# Patient Record
Sex: FEMALE | Race: WHITE | NOT HISPANIC OR LATINO | Employment: UNEMPLOYED | URBAN - METROPOLITAN AREA
[De-identification: names, ages, dates, MRNs, and addresses within clinical notes are randomized per-mention and may not be internally consistent; named-entity substitution may affect disease eponyms.]

---

## 2017-01-06 ENCOUNTER — PRENATAL OFFICE VISIT (OUTPATIENT)
Dept: OBGYN | Facility: CLINIC | Age: 27
End: 2017-01-06
Payer: COMMERCIAL

## 2017-01-06 VITALS
BODY MASS INDEX: 29.92 KG/M2 | WEIGHT: 179.8 LBS | SYSTOLIC BLOOD PRESSURE: 100 MMHG | DIASTOLIC BLOOD PRESSURE: 60 MMHG | TEMPERATURE: 98.5 F

## 2017-01-06 DIAGNOSIS — Z34.83 ENCOUNTER FOR SUPERVISION OF OTHER NORMAL PREGNANCY IN THIRD TRIMESTER: Primary | ICD-10-CM

## 2017-01-06 PROCEDURE — 99207 ZZC PRENATAL VISIT: CPT | Performed by: OBSTETRICS & GYNECOLOGY

## 2017-01-06 NOTE — NURSING NOTE
"Chief Complaint   Patient presents with     Prenatal Care   33w0d  C/o cramping in legs michael at hs    initial /60 mmHg  Temp(Src) 98.5  F (36.9  C) (Oral)  Wt 179 lb 12.8 oz (81.557 kg)  LMP 04/30/2016 Estimated body mass index is 29.92 kg/(m^2) as calculated from the following:    Height as of 10/31/16: 5' 5\" (1.651 m).    Weight as of this encounter: 179 lb 12.8 oz (81.557 kg).  BP completed using cuff size regular.  Clotilde Murphy CMA  "

## 2017-01-24 ENCOUNTER — PRENATAL OFFICE VISIT (OUTPATIENT)
Dept: OBGYN | Facility: CLINIC | Age: 27
End: 2017-01-24
Payer: COMMERCIAL

## 2017-01-24 VITALS
WEIGHT: 181 LBS | BODY MASS INDEX: 30.12 KG/M2 | DIASTOLIC BLOOD PRESSURE: 66 MMHG | SYSTOLIC BLOOD PRESSURE: 100 MMHG | TEMPERATURE: 97.8 F

## 2017-01-24 DIAGNOSIS — Z34.83 ENCOUNTER FOR SUPERVISION OF OTHER NORMAL PREGNANCY IN THIRD TRIMESTER: Primary | ICD-10-CM

## 2017-01-24 PROCEDURE — 99207 ZZC PRENATAL VISIT: CPT | Performed by: OBSTETRICS & GYNECOLOGY

## 2017-01-24 PROCEDURE — 87653 STREP B DNA AMP PROBE: CPT | Performed by: OBSTETRICS & GYNECOLOGY

## 2017-01-24 NOTE — PROGRESS NOTES
Carolyn Jacobs is a 26 year old female  Estimated Date of Delivery: 2017 at 35w4d who presents for a prenatal visit. She denies bleeding, abnormal leaking, or labor symptoms.   AGA doing well, good FM no concerns     This document serves as a record of the services and decisions personally performed and made by Valdemar Martinez M.D. It was created on his behalf by January Deal, a trained medical scribe. The creation of this document is based the provider's statements to the medical scribe.  January Deal 2017 1:43 PM     See OB Flowsheet  : normal external genitalia, speculum: no evidence of rupture of membranes    A: doing well over all, GBS done today  P: Follow up in 1 weeks. labor symptoms and symptoms of concern discussed, patient to call     The information in this document, created by the medical scribe for me, accurately reflects the services I personally performed and the decisions made by me. I have reviewed and approved this document for accuracy prior to leaving the patient care area.  2017 1:43 PM        Valdemar Martinez M.D.

## 2017-01-24 NOTE — PATIENT INSTRUCTIONS
You can reach your [facility] Care Team any time of the day by calling [phone]. This number will put you in touch with the 24 hour nurse line if the clinic is closed.    To contact your OB/GYN Station Coordinator/Surgery Scheduler please call [phone]. This is a direct number for your care team between 8 a.m. and 4 p.m. Monday through Friday.    [facility] is open for your convenience:  Monday through Friday 8 a.m. to 6 p.m.  Closed weekends and all major holidays.

## 2017-01-25 LAB
GP B STREP DNA SPEC QL NAA+PROBE: NORMAL
SPECIMEN SOURCE: NORMAL

## 2017-01-26 NOTE — PROGRESS NOTES
Quick Note:    Carolyn, all your results are within normal limits  Valdemar Martinez M.D.    ______

## 2017-01-31 ENCOUNTER — PRENATAL OFFICE VISIT (OUTPATIENT)
Dept: OBGYN | Facility: CLINIC | Age: 27
End: 2017-01-31
Payer: COMMERCIAL

## 2017-01-31 VITALS
WEIGHT: 183.2 LBS | TEMPERATURE: 97.7 F | SYSTOLIC BLOOD PRESSURE: 106 MMHG | DIASTOLIC BLOOD PRESSURE: 60 MMHG | HEIGHT: 65 IN | BODY MASS INDEX: 30.52 KG/M2

## 2017-01-31 DIAGNOSIS — Z53.9 ERRONEOUS ENCOUNTER--DISREGARD: Primary | ICD-10-CM

## 2017-01-31 PROCEDURE — 99207 ZZC NO CHARGE NURSE ONLY: CPT | Performed by: OBSTETRICS & GYNECOLOGY

## 2017-01-31 NOTE — NURSING NOTE
"36w4d    Chief Complaint   Patient presents with     Prenatal Care       Initial /60 mmHg  Temp(Src) 97.7  F (36.5  C) (Oral)  Ht 5' 5\" (1.651 m)  Wt 183 lb 3.2 oz (83.099 kg)  BMI 30.49 kg/m2  LMP 04/30/2016 Estimated body mass index is 30.49 kg/(m^2) as calculated from the following:    Height as of this encounter: 5' 5\" (1.651 m).    Weight as of this encounter: 183 lb 3.2 oz (83.099 kg).  BP completed using cuff size: tru Chacon CMA      "

## 2017-02-06 ENCOUNTER — PRENATAL OFFICE VISIT (OUTPATIENT)
Dept: OBGYN | Facility: CLINIC | Age: 27
End: 2017-02-06
Payer: COMMERCIAL

## 2017-02-06 VITALS
SYSTOLIC BLOOD PRESSURE: 100 MMHG | BODY MASS INDEX: 30.89 KG/M2 | WEIGHT: 185.6 LBS | DIASTOLIC BLOOD PRESSURE: 76 MMHG | TEMPERATURE: 98.1 F

## 2017-02-06 DIAGNOSIS — Z34.83 ENCOUNTER FOR SUPERVISION OF OTHER NORMAL PREGNANCY IN THIRD TRIMESTER: Primary | ICD-10-CM

## 2017-02-06 PROCEDURE — 99207 ZZC PRENATAL VISIT: CPT | Performed by: OBSTETRICS & GYNECOLOGY

## 2017-02-06 NOTE — PROGRESS NOTES
Carolyn Jacobs is a 26 year old female  Estimated Date of Delivery: 2017 at 37w3d who presents for a prenatal visit.   AGA doing well, good FM no concerns     Patient states she and baby are doing well, +fetal movement reported. Believes she is having some false contractions, not concerned. Denies bleeding or fluid loss. Notes shooting pain on inner thighs, increased discomfort overall.     This document serves as a record of the services and decisions personally performed and made by Valdemar Martinez M.D. It was created on his behalf by Jillian Mobley, a trained medical scribe. The creation of this document is based the provider's statements to the medical scribe.  Jillian Zendejas 2017 8:19 AM     See OB Flowsheet    A: Doing well, no concerns.   P: Follow up in 1 week. labor symptoms and symptoms of concern discussed, patient to call     The information in this document, created by the medical scribe for me, accurately reflects the services I personally performed and the decisions made by me. I have reviewed and approved this document for accuracy prior to leaving the patient care area.  2017 8:19 AM     Valdemar Martinez M.D.

## 2017-02-06 NOTE — PROGRESS NOTES
"Chief Complaint   Patient presents with     Prenatal Care       Initial LMP 04/30/2016 Estimated body mass index is 30.49 kg/(m^2) as calculated from the following:    Height as of 1/31/17: 5' 5\" (1.651 m).    Weight as of 1/31/17: 183 lb 3.2 oz (83.099 kg).  Medication Reconciliation: complete   37w3d      "

## 2017-02-06 NOTE — PATIENT INSTRUCTIONS
You can reach your [facility] Care Team any time of the day by calling [phone]. This number will put you in touch with the 24 hour nurse line if the clinic is closed.    To contact your OB/GYN Station Coordinator/Surgery Scheduler please call [phone]. This is a direct number for your care team between 8 a.m. and 4 p.m. Monday through Friday.    [facility] is open for your convenience:  Monday through Friday 8 a.m. to 6 p.m.  Closed weekends and all major holidays.

## 2017-02-13 ENCOUNTER — PRENATAL OFFICE VISIT (OUTPATIENT)
Dept: OBGYN | Facility: CLINIC | Age: 27
End: 2017-02-13
Payer: COMMERCIAL

## 2017-02-13 VITALS
DIASTOLIC BLOOD PRESSURE: 60 MMHG | WEIGHT: 187.5 LBS | BODY MASS INDEX: 31.2 KG/M2 | SYSTOLIC BLOOD PRESSURE: 118 MMHG | TEMPERATURE: 97.6 F

## 2017-02-13 DIAGNOSIS — Z34.83 ENCOUNTER FOR SUPERVISION OF OTHER NORMAL PREGNANCY IN THIRD TRIMESTER: Primary | ICD-10-CM

## 2017-02-13 PROCEDURE — 99207 ZZC PRENATAL VISIT: CPT | Performed by: OBSTETRICS & GYNECOLOGY

## 2017-02-13 NOTE — NURSING NOTE
"Chief Complaint   Patient presents with     Prenatal Care     No concerns.       Initial /60 (BP Location: Right arm, Patient Position: Chair, Cuff Size: Adult Regular)  Temp 97.6  F (36.4  C) (Oral)  Wt 187 lb 8 oz (85 kg)  LMP 04/30/2016  BMI 31.2 kg/m2 Estimated body mass index is 31.2 kg/(m^2) as calculated from the following:    Height as of 1/31/17: 5' 5\" (1.651 m).    Weight as of this encounter: 187 lb 8 oz (85 kg).  Medication Reconciliation: complete   Steffany Omer MA      "

## 2017-02-13 NOTE — MR AVS SNAPSHOT
MRN:[id number]                      After Visit Summary   2/13/2017    Carolyn Jacobs    MRN: [id number]           Visit Information        Provider Department      2/13/2017 2:00 PM Valdemar Martinez MD [facility]        Your next 10 appointments already scheduled     Feb 17, 2017  3:00 PM CST   ESTABLISHED PRENATAL with Catarina Rivera MD   [facility] ([facility])    [address]  [facility] [id number] [phone]              Care Instructions    You can reach your [facility] Care Team any time of the day by calling [phone]. This number will put you in touch with the 24 hour nurse line if the clinic is closed.    To contact your OB/GYN Station Coordinator/Surgery Scheduler please call [phone]. This is a direct number for your care team between 8 a.m. and 4 p.m. Monday through Friday.    [facility] is open for your convenience:  Monday through Friday 8 a.m. to 6 p.m.  Closed weekends and all major holidays.         [employer] Information     [employer] gives you secure access to your electronic health record. If you see a primary care provider, you can also send messages to your care team and make appointments. If you have questions, please call your primary care clinic.  If you do not have a primary care provider, please call [phone] and they will assist you.        Care EveryWhere ID     This is your Care EveryWhere ID. This could be used by other organizations to access your [facility] medical records  [id number]

## 2017-02-13 NOTE — PATIENT INSTRUCTIONS
You can reach your [facility] Care Team any time of the day by calling [phone]. This number will put you in touch with the 24 hour nurse line if the clinic is closed.    To contact your OB/GYN Station Coordinator/Surgery Scheduler please call [phone]. This is a direct number for your care team between 8 a.m. and 4 p.m. Monday through Friday.    [facility] is open for your convenience:  Monday through Friday 8 a.m. to 6 p.m.  Closed weekends and all major holidays.

## 2017-02-13 NOTE — PROGRESS NOTES
Carolyn Jacobs is a 26 year old female  Estimated Date of Delivery: 2017 at 38w3d who presents for a prenatal visit.   AGA doing well, good FM no concerns     Patient states she and baby are doing well. Patient states she has not been feeling as much activity for the last few days but still has the required amount of fetal movement daily. Patient mainly feels baby activity at night around 7pm and 8pm. She feels she has had some contractions last week but nothing concerning. Otherwise, denies leaking fluid or bleeding.     This document serves as a record of the services and decisions personally performed and made by Valdemar Martinez M.D. It was created on his behalf by Marisela Oakes, a trained medical scribe. The creation of this document is based the provider's statements to the medical scribe.  Marisela Oakes 2017 8:39 AM     See OB Flowsheet    A: doing well, no concerns. Discussed with patient about tracking baby activity and 10 baby movements for 1 hour  P: Follow up in 1 week. labor symptoms and symptoms of concern discussed, patient to call     The information in this document, created by the medical scribe for me, accurately reflects the services I personally performed and the decisions made by me. I have reviewed and approved this document for accuracy prior to leaving the patient care area.  2017 8:39 AM       Valdemar Martinez M.D.

## 2017-02-16 ENCOUNTER — HOSPITAL ENCOUNTER (OUTPATIENT)
Facility: CLINIC | Age: 27
Discharge: HOME | End: 2017-02-16
Attending: FAMILY MEDICINE | Admitting: FAMILY MEDICINE
Payer: COMMERCIAL

## 2017-02-16 VITALS
DIASTOLIC BLOOD PRESSURE: 68 MMHG | HEIGHT: 64 IN | BODY MASS INDEX: 31.92 KG/M2 | HEART RATE: 84 BPM | WEIGHT: 187 LBS | TEMPERATURE: 97.6 F | RESPIRATION RATE: 16 BRPM | SYSTOLIC BLOOD PRESSURE: 107 MMHG

## 2017-02-16 PROBLEM — O36.8190 DECREASED FETAL MOVEMENT: Status: ACTIVE | Noted: 2017-02-16

## 2017-02-16 PROCEDURE — 59025 FETAL NON-STRESS TEST: CPT

## 2017-02-16 PROCEDURE — 59025 FETAL NON-STRESS TEST: CPT | Mod: 26 | Performed by: OBSTETRICS & GYNECOLOGY

## 2017-02-16 PROCEDURE — 99213 OFFICE O/P EST LOW 20 MIN: CPT | Mod: 25

## 2017-02-16 RX ORDER — ONDANSETRON 2 MG/ML
4 INJECTION INTRAMUSCULAR; INTRAVENOUS EVERY 6 HOURS PRN
Status: DISCONTINUED | OUTPATIENT
Start: 2017-02-16 | End: 2017-02-16 | Stop reason: HOSPADM

## 2017-02-16 NOTE — PLAN OF CARE
Patient arrived to L&D for decreased fetal movement.  38+6W gestation. Reports not feeling the baby move since yesterday. Denies bleeding, has had some mucous discharge. Reports cramping in her back and occasional ctx. History and physical obtained. SVE unchanged from previous clinic visit. External monitors applied for NST. Patient does hear baby's hiccups, reassured about baby's heart rate.

## 2017-02-16 NOTE — PROGRESS NOTES
Data: [facility] at 1410.   Reason for maternal/fetal assessment per patient is Decreased Fetal Movement  . Patient is a . Prenatal record reviewed.      Obstetric History       T1      TAB0   SAB0   E0   M0   L1       # Outcome Date GA Lbr Doyle/2nd Weight Sex Delivery Anes PTL Lv   3 Current            2 Term 09/10/14 39w0d 03:55 / 04:40 3.46 kg (7 lb 10.1 oz) M Vag-Spont EPI N Y      Name: Ian      Apgar1:  8                Apgar5: 9   1 AB                  Medical History:   Past Medical History   Diagnosis Date     Abnormal Pap smear      HPV & normal      Anemia      not on iron vits, O+ blood type     Anxiety      Varicosities    . Gestational Age 38w6d. VSS. Cervix: dilated to 2/50/-2.  Fetal movement present. Patient denies  UTI symptoms, GI problems, bloody show, vaginal bleeding, edema, headache, visual disturbances, epigastric or URQ pain, abdominal pain, rupture of membranes. Support persons sister Merly is present.  Action: Verbal consent for EFM. Triage assessment completed. EFM applied for fetal well being. Uterine assessment completed. Fetal assessment: Presumed adequate fetal oxygenation documented (see flow record). Patient education pamphlets given on fetal movement counts and labor instructions. Patient instructed to report change in fetal movement, vaginal leaking of fluid or bleeding, abdominal pain, or any concerns related to the pregnancy to her nurse/physician.   Response: Dr. Dior informed of reactive NST, SVE. Plan per provider is discharge to home. Follow up as scheduled. Patient verbalized understanding of education and verbalized agreement with plan. Discharged ambulatory at 1456.

## 2017-02-16 NOTE — IP AVS SNAPSHOT
[facility] Labor [facility] Delivery    [address]    [facility] [address]    Phone:  [phone]    Fax:  [phone]                                       After Visit Summary   2/16/2017    Carolyn Jacobs    MRN: [id number]           After Visit Summary Signature Page     I have received my discharge instructions, and my questions have been answered. I have discussed any challenges I see with this plan with the nurse or doctor.    ..........................................................................................................................................  Patient/Patient Representative Signature      ..........................................................................................................................................  Patient Representative Print Name and Relationship to Patient    ..................................................               ................................................  Date                                            Time    ..........................................................................................................................................  Reviewed by Signature/Title    ...................................................              ..............................................  Date                                                            Time

## 2017-02-16 NOTE — DISCHARGE INSTRUCTIONS
Discharge Instruction for Undelivered Patients      You were seen for: Fetal Assessment  We Consulted: Dr. Dior  You had (Test or Medicine):Non-Stress Test     Diet:   Drink 8 to 12 glasses of liquids (milk, juice, water) every day.  You may eat meals and snacks.     Activity:  Count fetal kicks everyday (see handout)  Call your doctor or nurse midwife if your baby is moving less than usual.     Call your provider if you notice:  Swelling in your face or increased swelling in your hands or legs.  Headaches that are not relieved by Tylenol (acetaminophen).  Changes in your vision (blurring: seeing spots or stars.)  Nausea (sick to your stomach) and vomiting (throwing up).   Weight gain of 5 pounds or more per week.  Heartburn that doesn't go away.  Signs of bladder infection: pain when you urinate (use the toilet), need to go more often and more urgently.  The bag of rodriguez (rupture of membranes) breaks, or you notice leaking in your underwear.  Bright red blood in your underwear.  Abdominal (lower belly) or stomach pain.  Second (plus) baby: Contractions (tightening) less than 10 minutes apart and getting stronger.  Increase or change in vaginal discharge (note the color and amount)    Follow-up:  As scheduled in the clinic

## 2017-02-16 NOTE — IP AVS SNAPSHOT
MRN:[id number]                      After Visit Summary   2/16/2017    Carolyn Jacobs    MRN: [id number]           Thank you!     Thank you for choosing [facility] for your care. Our goal is always to provide you with excellent care. Hearing back from our patients is one way we can continue to improve our services. Please take a few minutes to complete the written survey that you may receive in the mail after you visit. If you would like to speak to someone directly about your visit please contact Patient Relations at [phone]. Thank you!          Patient Information     Date Of Birth          1990        About your hospital stay     You were admitted on:  February 16, 2017 You last received care in the:  [facility] Labor and Delivery    You were discharged on:  February 16, 2017       Who to Call     For medical emergencies, please call 911.  For non-urgent questions about your medical care, please call your primary care provider or clinic, [phone]          Attending Provider     Provider Specialty    Vicky Dior DO OB/Gyn       Primary Care Provider Office Phone # Fax #    Tanisha Amaya [phone]       [facility] [address] [place] [address]        Your next 10 appointments already scheduled     Feb 17, 2017  3:00 PM CST   ESTABLISHED PRENATAL with Catarina Rivera MD   [facility]    [address]  [facility] [id number] [phone]              Further instructions from your care team       Discharge Instruction for Undelivered Patients      You were seen for: Fetal Assessment  We Consulted: Dr. Dior  You had (Test or Medicine):Non-Stress Test     Diet:   Drink 8 to 12 glasses of liquids (milk, juice, water) every day.  You may eat meals and snacks.     Activity:  Count fetal kicks everyday (see handout)  Call your doctor or nurse midwife if your  "baby is moving less than usual.     Call your provider if you notice:  Swelling in your face or increased swelling in your hands or legs.  Headaches that are not relieved by Tylenol (acetaminophen).  Changes in your vision (blurring: seeing spots or stars.)  Nausea (sick to your stomach) and vomiting (throwing up).   Weight gain of 5 pounds or more per week.  Heartburn that doesn't go away.  Signs of bladder infection: pain when you urinate (use the toilet), need to go more often and more urgently.  The bag of rodriguez (rupture of membranes) breaks, or you notice leaking in your underwear.  Bright red blood in your underwear.  Abdominal (lower belly) or stomach pain.  Second (plus) baby: Contractions (tightening) less than 10 minutes apart and getting stronger.  Increase or change in vaginal discharge (note the color and amount)    Follow-up:  As scheduled in the clinic          Pending Results     No orders found from 2/14/2017 to 2/17/2017.            Admission Information     Date & Time Provider Department Dept. Phone    2/16/2017 Vicky Dior, DO [facility] Labor and Delivery [phone]      Your Vitals Were     Blood Pressure Pulse Temperature Respirations Height Weight    107/68 84 97.6  F (36.4  C) (Oral) 16 1.626 m (5' 4\") 84.8 kg (187 lb)    Last Period BMI (Body Mass Index)                04/30/2016 32.1 kg/m2          [employer] Information     [employer] gives you secure access to your electronic health record. If you see a primary care provider, you can also send messages to your care team and make appointments. If you have questions, please call your primary care clinic.  If you do not have a primary care provider, please call [phone] and they will assist you.        Care EveryWhere ID     This is your Care EveryWhere ID. This could be used by other organizations to access your [facility] medical records  [id number]           Review of your medicines      UNREVIEWED medicines. Ask your " doctor about these medicines        Dose / Directions    PRENATAL VIT-FE BISG-FA-DHA PO        Refills:  0                Protect others around you: Learn how to safely use, store and throw away your medicines at www.disposemymeds.org.             Medication List: This is a list of all your medications and when to take them. Check marks below indicate your daily home schedule. Keep this list as a reference.      Medications           Morning Afternoon Evening Bedtime As Needed    PRENATAL VIT-FE BISG-FA-DHA PO

## 2017-02-16 NOTE — PROVIDER NOTIFICATION
02/16/17 1445   Provider Notification   Provider Name/Title Dr. Dior   Method of Notification Electronic Page   Request Evaluate - Remote   Notification Reason Patient Arrived;SVE;Uterine Activity   Dr. Dior updated on patient here for decreased fetal movement. NST reactive. SVE unchanged from last clinic visit. Okay for patient to discharge, follow up in clinic as scheduled.

## 2017-02-17 ENCOUNTER — PRENATAL OFFICE VISIT (OUTPATIENT)
Dept: OBGYN | Facility: CLINIC | Age: 27
End: 2017-02-17
Payer: COMMERCIAL

## 2017-02-17 VITALS
SYSTOLIC BLOOD PRESSURE: 110 MMHG | WEIGHT: 184.5 LBS | BODY MASS INDEX: 31.67 KG/M2 | DIASTOLIC BLOOD PRESSURE: 70 MMHG | TEMPERATURE: 97.9 F

## 2017-02-17 DIAGNOSIS — Z34.03 ENCOUNTER FOR SUPERVISION OF NORMAL FIRST PREGNANCY IN THIRD TRIMESTER: Primary | ICD-10-CM

## 2017-02-17 PROCEDURE — 99207 ZZC PRENATAL VISIT: CPT | Performed by: OBSTETRICS & GYNECOLOGY

## 2017-02-17 NOTE — MR AVS SNAPSHOT
After Visit Summary   2/17/2017    Carolyn Jacobs    MRN: [id number]           Patient Information     Date Of Birth          1990        Visit Information        Provider Department      2/17/2017 3:00 PM Catarina Rivera MD [facility]        Today's Diagnoses     Encounter for supervision of normal first pregnancy in third trimester    -  1       Follow-ups after your visit        Your next 10 appointments already scheduled     Feb 24, 2017  2:30 PM CST   ESTABLISHED PRENATAL with Dotty Hamlin MD   [facility] ([facility])    [address]  [facility] [id number] [phone]              Who to contact     If you have questions or need follow up information about today's clinic visit or your schedule please contact [facility] directly at [phone].  Normal or non-critical lab and imaging results will be communicated to you by MyChart, letter or phone within 4 business days after the clinic has received the results. If you do not hear from us within 7 days, please contact the clinic through MyChart or phone. If you have a critical or abnormal lab result, we will notify you by phone as soon as possible.  Submit refill requests through [email or web address] or call your pharmacy and they will forward the refill request to us. Please allow 3 business days for your refill to be completed.          Additional Information About Your Visit        MyChart Information     [email or web address] gives you secure access to your electronic health record. If you see a primary care provider, you can also send messages to your care team and make appointments. If you have questions, please call your primary care clinic.  If you do not have a primary care provider, please call [phone] and they will assist you.        Care EveryWhere ID     This is your Care EveryWhere ID. This could be used by other organizations to access your [facility]  medical records  [id number]        Your Vitals Were     Temperature Last Period BMI (Body Mass Index)             97.9  F (36.6  C) 04/30/2016 31.67 kg/m2          Blood Pressure from Last 3 Encounters:   02/17/17 110/70   02/16/17 107/68   02/13/17 118/60    Weight from Last 3 Encounters:   02/17/17 184 lb 8 oz (83.7 kg)   02/16/17 187 lb (84.8 kg)   02/13/17 187 lb 8 oz (85 kg)              Today, you had the following     No orders found for display       Primary Care Provider Office Phone # Fax #    Tanisha Amaya [phone]       [facility] [address] [place] [address]        Thank you!     Thank you for choosing [facility]  for your care. Our goal is always to provide you with excellent care. Hearing back from our patients is one way we can continue to improve our services. Please take a few minutes to complete the written survey that you may receive in the mail after your visit with us. Thank you!             Your Updated Medication List - Protect others around you: Learn how to safely use, store and throw away your medicines at www.disposemymeds.org.          This list is accurate as of: 2/17/17  3:27 PM.  Always use your most recent med list.                   Brand Name Dispense Instructions for use    PRENATAL VIT-FE BISG-FA-DHA PO

## 2017-02-17 NOTE — NURSING NOTE
"Chief Complaint   Patient presents with     Prenatal Care       Initial /70 (BP Location: Left arm, Patient Position: Chair, Cuff Size: Adult Regular)  Temp 97.9  F (36.6  C)  Wt 184 lb 8 oz (83.7 kg)  LMP 04/30/2016  BMI 31.67 kg/m2 Estimated body mass index is 31.67 kg/(m^2) as calculated from the following:    Height as of 2/16/17: 5' 4\" (1.626 m).    Weight as of this encounter: 184 lb 8 oz (83.7 kg).  Medication Reconciliation: complete  39w0d    "

## 2017-02-17 NOTE — PROGRESS NOTES
Doing well today. Would like her cervix stripped. In triage last night for decreased fetal movement with normal NST. Average movement today. No bleeding. Rare contractions.   /70 (BP Location: Left arm, Patient Position: Chair, Cuff Size: Adult Regular)  Temp 97.9  F (36.6  C)  Wt 184 lb 8 oz (83.7 kg)  LMP 2016  BMI 31.67 kg/m2   See OB flowsheet.    Carolyn Jacobs is a 26 year old female  at 39w0d   Reviewed labor precautions.   Return to clinic in 1 week if not delivered.     Catarina Rivera MD

## 2017-02-21 ENCOUNTER — HOSPITAL ENCOUNTER (OUTPATIENT)
Facility: CLINIC | Age: 27
Discharge: HOME | End: 2017-02-21
Attending: OBSTETRICS & GYNECOLOGY | Admitting: OBSTETRICS & GYNECOLOGY
Payer: COMMERCIAL

## 2017-02-21 VITALS
TEMPERATURE: 97.6 F | HEART RATE: 84 BPM | RESPIRATION RATE: 16 BRPM | HEIGHT: 64 IN | DIASTOLIC BLOOD PRESSURE: 59 MMHG | SYSTOLIC BLOOD PRESSURE: 105 MMHG

## 2017-02-21 PROCEDURE — 59025 FETAL NON-STRESS TEST: CPT

## 2017-02-21 PROCEDURE — 59025 FETAL NON-STRESS TEST: CPT | Mod: 26 | Performed by: OBSTETRICS & GYNECOLOGY

## 2017-02-21 PROCEDURE — 99213 OFFICE O/P EST LOW 20 MIN: CPT | Mod: 25

## 2017-02-21 RX ORDER — ONDANSETRON 2 MG/ML
4 INJECTION INTRAMUSCULAR; INTRAVENOUS EVERY 6 HOURS PRN
Status: DISCONTINUED | OUTPATIENT
Start: 2017-02-21 | End: 2017-02-21 | Stop reason: HOSPADM

## 2017-02-21 NOTE — IP AVS SNAPSHOT
MRN:[id number]                      After Visit Summary   2017    Carolyn Jacobs    MRN: [id number]           Thank you!     Thank you for choosing [facility] for your care. Our goal is always to provide you with excellent care. Hearing back from our patients is one way we can continue to improve our services. Please take a few minutes to complete the written survey that you may receive in the mail after you visit. If you would like to speak to someone directly about your visit please contact Patient Relations at [phone]. Thank you!          Patient Information     Date Of Birth          1990        About your hospital stay     You were admitted on:  2017 You last received care in the:  [facility] Labor and Delivery    You were discharged on:  2017       Who to Call     For medical emergencies, please call 911.  For non-urgent questions about your medical care, please call your primary care provider or clinic, [phone]          Attending Provider     Provider Specialty    Catarina Rivera MD OB/Gyn       Primary Care Provider Office Phone # Fax #    Tanishasujata Amaya [phone]       [facility] [address] [facility] [address]        Your next 10 appointments already scheduled     2017  2:30 PM CST   ESTABLISHED PRENATAL with Dotty Hamlin MD   [facility]    [address]  [facility] [address] [phone]              Further instructions from your care team       Data: Patient presented to the [facility] at 1150.   Reason for maternal/fetal assessment per patient is Decreased Fetal Movement  . Patient is a . Prenatal record reviewed.      Obstetric History       T1      TAB0   SAB0   E0   M0   L1       # Outcome Date GA Lbr Doyle/2nd Weight Sex Delivery Anes PTL Lv   3 Current            2 Term 09/10/14  "39w0d 03:55 / 04:40 3.46 kg (7 lb 10.1 oz) M Vag-Spont EPI N Y      Name: Ian      Apgar1:  8                Apgar5: 9   1 AB 2005                 Medical History:   Past Medical History   Diagnosis Date     Abnormal Pap smear      HPV & normal      Anemia      not on iron vits, O+ blood type     Anxiety      Varicosities    . Gestational Age 39w4d. VSS. Cervix: dilated to 3 which is not a change.  Fetal movement present. Patient denies cramping, backache, vaginal discharge, pelvic pressure, UTI symptoms, GI problems, bloody show, vaginal bleeding, edema, headache, visual disturbances, epigastric or URQ pain, abdominal pain, rupture of membranes. Support persons not  present.  Action: Verbal consent for EFM. Triage assessment completed. EFM applied for fetal well-being. Uterine assessment done with external uterine monitor. Fetal assessment: Presumed adequate fetal oxygenation documented (see flow record).  Patient instructed to report change in fetal movement, vaginal leaking of fluid or bleeding, abdominal pain, or any concerns related to the pregnancy to her nurse/physician.   Response: Dr. Rivera informed of patient's arrival. Plan per provider is patient to be discharged. Patient verbalized understanding of education and verbalized agreement with plan. Discharged ambulatory at 1250.        Pending Results     No orders found from 2/19/2017 to 2/22/2017.            Admission Information     Date & Time Provider Department Dept. Phone    2/21/2017 Catarina Rivera MD [facility] Labor and Delivery [phone]      Your Vitals Were     Blood Pressure Pulse Temperature Respirations Height Last Period    105/59 84 97.6  F (36.4  C) (Oral) 16 1.626 m (5' 4\") 04/30/2016      [employer] Information     [employer] gives you secure access to your electronic health record. If you see a primary care provider, you can also send messages to your care team and make appointments. If you have questions, please call your primary " care clinic.  If you do not have a primary care provider, please call [phone] and they will assist you.        Care EveryWhere ID     This is your Care EveryWhere ID. This could be used by other organizations to access your [place] medical records  [id number]           Review of your medicines      UNREVIEWED medicines. Ask your doctor about these medicines        Dose / Directions    PRENATAL VIT-FE BISG-FA-DHA PO        Refills:  0                Protect others around you: Learn how to safely use, store and throw away your medicines at www.disposemymeds.org.             Medication List: This is a list of all your medications and when to take them. Check marks below indicate your daily home schedule. Keep this list as a reference.      Medications           Morning Afternoon Evening Bedtime As Needed    PRENATAL VIT-FE BISG-FA-DHA PO

## 2017-02-21 NOTE — PROVIDER NOTIFICATION
02/21/17 1243   Provider Notification   Provider Name/Title Dr. Rivera   Method of Notification Phone   Request Evaluate - Remote   Notification Reason Patient Arrived;Status Update     Dr. Rivera notified of patient's admission and concern for decreased fetal movement. Category 1 tracing noted with baseline of 130 bpm and accels to 145 x 15 minutes, meeting criteria for reactive NST.

## 2017-02-21 NOTE — IP AVS SNAPSHOT
[facility] Labor and Delivery    [address]    [facility] [address]    Phone:  [phone]    Fax:  [phone]                                       After Visit Summary   2/21/2017    Carolyn Jacobs    MRN: [id number]           After Visit Summary Signature Page     I have received my discharge instructions, and my questions have been answered. I have discussed any challenges I see with this plan with the nurse or doctor.    ..........................................................................................................................................  Patient/Patient Representative Signature      ..........................................................................................................................................  Patient Representative Print Name and Relationship to Patient    ..................................................               ................................................  Date                                            Time    ..........................................................................................................................................  Reviewed by Signature/Title    ...................................................              ..............................................  Date                                                            Time

## 2017-02-21 NOTE — DISCHARGE INSTRUCTIONS
Data: Patient presented to the [facility] at 1150.   Reason for maternal/fetal assessment per patient is Decreased Fetal Movement  . Patient is a . Prenatal record reviewed.      Obstetric History       T1      TAB0   SAB0   E0   M0   L1       # Outcome Date GA Lbr Doyle/2nd Weight Sex Delivery Anes PTL Lv   3 Current            2 Term 09/10/14 39w0d 03:55 / 04:40 3.46 kg (7 lb 10.1 oz) M Vag-Spont EPI N Y      Name: Ian      Apgar1:  8                Apgar5: 9   1 AB                  Medical History:   Past Medical History   Diagnosis Date     Abnormal Pap smear      HPV & normal      Anemia      not on iron vits, O+ blood type     Anxiety      Varicosities    . Gestational Age 39w4d. VSS. Cervix: dilated to 3 which is not a change.  Fetal movement present. Patient denies cramping, backache, vaginal discharge, pelvic pressure, UTI symptoms, GI problems, bloody show, vaginal bleeding, edema, headache, visual disturbances, epigastric or URQ pain, abdominal pain, rupture of membranes. Support persons not  present.  Action: Verbal consent for EFM. Triage assessment completed. EFM applied for fetal well-being. Uterine assessment done with external uterine monitor. Fetal assessment: Presumed adequate fetal oxygenation documented (see flow record).  Patient instructed to report change in fetal movement, vaginal leaking of fluid or bleeding, abdominal pain, or any concerns related to the pregnancy to her nurse/physician.   Response: Dr. Rivera informed of patient's arrival. Plan per provider is patient to be discharged. Patient verbalized understanding of education and verbalized agreement with plan. Discharged ambulatory at 1250.

## 2017-02-24 ENCOUNTER — PRENATAL OFFICE VISIT (OUTPATIENT)
Dept: OBGYN | Facility: CLINIC | Age: 27
End: 2017-02-24
Payer: COMMERCIAL

## 2017-02-24 VITALS
DIASTOLIC BLOOD PRESSURE: 70 MMHG | SYSTOLIC BLOOD PRESSURE: 102 MMHG | HEIGHT: 64 IN | WEIGHT: 185.2 LBS | BODY MASS INDEX: 31.62 KG/M2 | TEMPERATURE: 97.7 F

## 2017-02-24 DIAGNOSIS — O36.8130 DECREASED FETAL MOVEMENT, THIRD TRIMESTER, NOT APPLICABLE OR UNSPECIFIED FETUS: ICD-10-CM

## 2017-02-24 DIAGNOSIS — Z34.03 ENCOUNTER FOR SUPERVISION OF NORMAL FIRST PREGNANCY IN THIRD TRIMESTER: Primary | ICD-10-CM

## 2017-02-24 PROCEDURE — 59025 FETAL NON-STRESS TEST: CPT | Performed by: OBSTETRICS & GYNECOLOGY

## 2017-02-24 PROCEDURE — 99207 ZZC PRENATAL VISIT: CPT | Performed by: OBSTETRICS & GYNECOLOGY

## 2017-02-24 NOTE — MR AVS SNAPSHOT
"              After Visit Summary   2/24/2017    Carolyn Jacobs    MRN: [id number]           Patient Information     Date Of Birth          1990        Visit Information        Provider Department      2/24/2017 2:30 PM Dotty Hamlin MD [facility]        Today's Diagnoses     Encounter for supervision of normal first pregnancy in third trimester    -  1    Decreased fetal movement, third trimester, not applicable or unspecified fetus           Follow-ups after your visit        Who to contact     If you have questions or need follow up information about today's clinic visit or your schedule please contact [facility] directly at [phone].  Normal or non-critical lab and imaging results will be communicated to you by [employer], letter or phone within 4 business days after the clinic has received the results. If you do not hear from us within 7 days, please contact the clinic through [employer] or phone. If you have a critical or abnormal lab result, we will notify you by phone as soon as possible.  Submit refill requests through [employer] or call your pharmacy and they will forward the refill request to us. Please allow 3 business days for your refill to be completed.          Additional Information About Your Visit        MyChart Information     [employer] gives you secure access to your electronic health record. If you see a primary care provider, you can also send messages to your care team and make appointments. If you have questions, please call your primary care clinic.  If you do not have a primary care provider, please call [phone] and they will assist you.        Care EveryWhere ID     This is your Care EveryWhere ID. This could be used by other organizations to access your [place] medical records  [id number]        Your Vitals Were     Temperature Height Last Period Breastfeeding? BMI (Body Mass Index)       97.7  F (36.5  C) (Oral) 5' 4\" (1.626 m) " 04/30/2016 No 31.79 kg/m2        Blood Pressure from Last 3 Encounters:   02/24/17 102/70   02/21/17 105/59   02/17/17 110/70    Weight from Last 3 Encounters:   02/24/17 185 lb 3.2 oz (84 kg)   02/17/17 184 lb 8 oz (83.7 kg)   02/16/17 187 lb (84.8 kg)              We Performed the Following     FETAL NON-STRESS TEST        Primary Care Provider Office Phone # Fax #    Tanishasujata Amaya [phone]       [facility] [address] [place] [address]        Thank you!     Thank you for choosing [facility]  for your care. Our goal is always to provide you with excellent care. Hearing back from our patients is one way we can continue to improve our services. Please take a few minutes to complete the written survey that you may receive in the mail after your visit with us. Thank you!             Your Updated Medication List - Protect others around you: Learn how to safely use, store and throw away your medicines at www.disposemymeds.org.          This list is accurate as of: 2/24/17  4:10 PM.  Always use your most recent med list.                   Brand Name Dispense Instructions for use    PRENATAL VIT-FE BISG-FA-DHA PO

## 2017-02-24 NOTE — NURSING NOTE
Pt scheduled at [facility] for induction of labor on 2/25/17. Pt given induction instructions in clinic today. Induction order faxed to L/D. Induction placed on Outlook calendar.  Romelia Chacon CMA

## 2017-02-24 NOTE — PROGRESS NOTES
CC: Here for routine prenatal visit @ 40w0d       HPI:  denies vaginal bleeding, regular contractions, loss of fluid;  Decreased fetal movement reported.       Exam:   See OB flowsheet    NST today reactive, , moderate variability + accels, no decels; no uterine contractions.       ASSESSMENT/PLAN  Carolyn Jacobs is a 26 year old   @ 40w0d     Offered elective induction in light of 40 wks, multiparity with favorable cervix; also patient concerned regarding decreased FM.   Reactive NST today.   Will schedule for induction tomorrow.

## 2017-02-25 ENCOUNTER — ANESTHESIA (OUTPATIENT)
Dept: OBGYN | Facility: CLINIC | Age: 27
End: 2017-02-25
Payer: COMMERCIAL

## 2017-02-25 ENCOUNTER — HOSPITAL ENCOUNTER (INPATIENT)
Facility: CLINIC | Age: 27
LOS: 1 days | Discharge: HOME HEALTH | End: 2017-02-26
Attending: OBSTETRICS & GYNECOLOGY | Admitting: OBSTETRICS & GYNECOLOGY
Payer: COMMERCIAL

## 2017-02-25 ENCOUNTER — ANESTHESIA EVENT (OUTPATIENT)
Dept: OBGYN | Facility: CLINIC | Age: 27
End: 2017-02-25
Payer: COMMERCIAL

## 2017-02-25 LAB
ABO + RH BLD: NORMAL
ABO + RH BLD: NORMAL
BLD GP AB SCN SERPL QL: NORMAL
BLOOD BANK CMNT PATIENT-IMP: NORMAL
SPECIMEN EXP DATE BLD: NORMAL

## 2017-02-25 PROCEDURE — 59400 OBSTETRICAL CARE: CPT | Performed by: OBSTETRICS & GYNECOLOGY

## 2017-02-25 PROCEDURE — 86780 TREPONEMA PALLIDUM: CPT | Performed by: OBSTETRICS & GYNECOLOGY

## 2017-02-25 PROCEDURE — 10907ZC DRAINAGE OF AMNIOTIC FLUID, THERAPEUTIC FROM PRODUCTS OF CONCEPTION, VIA NATURAL OR ARTIFICIAL OPENING: ICD-10-PCS | Performed by: OBSTETRICS & GYNECOLOGY

## 2017-02-25 PROCEDURE — 12000029 ZZH R&B OB INTERMEDIATE

## 2017-02-25 PROCEDURE — 3E0S3CZ INTRODUCTION OF REGIONAL ANESTHETIC INTO EPIDURAL SPACE, PERCUTANEOUS APPROACH: ICD-10-PCS | Performed by: OBSTETRICS & GYNECOLOGY

## 2017-02-25 PROCEDURE — 25000125 ZZHC RX 250: Performed by: OBSTETRICS & GYNECOLOGY

## 2017-02-25 PROCEDURE — 37000011 ZZH ANESTHESIA WARD SERVICE

## 2017-02-25 PROCEDURE — 40000671 ZZH STATISTIC ANESTHESIA CASE

## 2017-02-25 PROCEDURE — 72200001 ZZH LABOR CARE VAGINAL DELIVERY SINGLE

## 2017-02-25 PROCEDURE — 25000132 ZZH RX MED GY IP 250 OP 250 PS 637

## 2017-02-25 PROCEDURE — 25000128 H RX IP 250 OP 636

## 2017-02-25 PROCEDURE — 86850 RBC ANTIBODY SCREEN: CPT | Performed by: OBSTETRICS & GYNECOLOGY

## 2017-02-25 PROCEDURE — 86900 BLOOD TYPING SEROLOGIC ABO: CPT | Performed by: OBSTETRICS & GYNECOLOGY

## 2017-02-25 PROCEDURE — 25800025 ZZH RX 258: Performed by: OBSTETRICS & GYNECOLOGY

## 2017-02-25 PROCEDURE — 25000128 H RX IP 250 OP 636: Performed by: ANESTHESIOLOGY

## 2017-02-25 PROCEDURE — 25000132 ZZH RX MED GY IP 250 OP 250 PS 637: Performed by: OBSTETRICS & GYNECOLOGY

## 2017-02-25 PROCEDURE — 00HU33Z INSERTION OF INFUSION DEVICE INTO SPINAL CANAL, PERCUTANEOUS APPROACH: ICD-10-PCS | Performed by: OBSTETRICS & GYNECOLOGY

## 2017-02-25 PROCEDURE — 86901 BLOOD TYPING SEROLOGIC RH(D): CPT | Performed by: OBSTETRICS & GYNECOLOGY

## 2017-02-25 RX ORDER — ONDANSETRON 2 MG/ML
4 INJECTION INTRAMUSCULAR; INTRAVENOUS EVERY 6 HOURS PRN
Status: DISCONTINUED | OUTPATIENT
Start: 2017-02-25 | End: 2017-02-25

## 2017-02-25 RX ORDER — NALOXONE HYDROCHLORIDE 0.4 MG/ML
.1-.4 INJECTION, SOLUTION INTRAMUSCULAR; INTRAVENOUS; SUBCUTANEOUS
Status: DISCONTINUED | OUTPATIENT
Start: 2017-02-25 | End: 2017-02-25

## 2017-02-25 RX ORDER — OXYCODONE AND ACETAMINOPHEN 5; 325 MG/1; MG/1
1 TABLET ORAL
Status: DISCONTINUED | OUTPATIENT
Start: 2017-02-25 | End: 2017-02-25

## 2017-02-25 RX ORDER — OXYTOCIN 10 [USP'U]/ML
10 INJECTION, SOLUTION INTRAMUSCULAR; INTRAVENOUS
Status: DISCONTINUED | OUTPATIENT
Start: 2017-02-25 | End: 2017-02-26 | Stop reason: HOSPADM

## 2017-02-25 RX ORDER — OXYTOCIN 10 [USP'U]/ML
10 INJECTION, SOLUTION INTRAMUSCULAR; INTRAVENOUS
Status: DISCONTINUED | OUTPATIENT
Start: 2017-02-25 | End: 2017-02-25

## 2017-02-25 RX ORDER — HYDROCORTISONE 2.5 %
CREAM (GRAM) TOPICAL 3 TIMES DAILY PRN
Status: DISCONTINUED | OUTPATIENT
Start: 2017-02-25 | End: 2017-02-26 | Stop reason: HOSPADM

## 2017-02-25 RX ORDER — OXYTOCIN/0.9 % SODIUM CHLORIDE 30/500 ML
100 PLASTIC BAG, INJECTION (ML) INTRAVENOUS CONTINUOUS
Status: DISCONTINUED | OUTPATIENT
Start: 2017-02-25 | End: 2017-02-26 | Stop reason: HOSPADM

## 2017-02-25 RX ORDER — CARBOPROST TROMETHAMINE 250 UG/ML
250 INJECTION, SOLUTION INTRAMUSCULAR
Status: DISCONTINUED | OUTPATIENT
Start: 2017-02-25 | End: 2017-02-25

## 2017-02-25 RX ORDER — EPHEDRINE SULFATE 50 MG/ML
5 INJECTION, SOLUTION INTRAMUSCULAR; INTRAVENOUS; SUBCUTANEOUS
Status: DISCONTINUED | OUTPATIENT
Start: 2017-02-25 | End: 2017-02-25

## 2017-02-25 RX ORDER — METHYLERGONOVINE MALEATE 0.2 MG/ML
200 INJECTION INTRAVENOUS
Status: DISCONTINUED | OUTPATIENT
Start: 2017-02-25 | End: 2017-02-25

## 2017-02-25 RX ORDER — LANOLIN 100 %
OINTMENT (GRAM) TOPICAL
Status: DISCONTINUED | OUTPATIENT
Start: 2017-02-25 | End: 2017-02-26 | Stop reason: HOSPADM

## 2017-02-25 RX ORDER — AMOXICILLIN 250 MG
1-2 CAPSULE ORAL 2 TIMES DAILY
Status: DISCONTINUED | OUTPATIENT
Start: 2017-02-25 | End: 2017-02-26 | Stop reason: HOSPADM

## 2017-02-25 RX ORDER — ACETAMINOPHEN 325 MG/1
650 TABLET ORAL EVERY 4 HOURS PRN
Status: DISCONTINUED | OUTPATIENT
Start: 2017-02-25 | End: 2017-02-25

## 2017-02-25 RX ORDER — OXYTOCIN/0.9 % SODIUM CHLORIDE 30/500 ML
1-24 PLASTIC BAG, INJECTION (ML) INTRAVENOUS CONTINUOUS
Status: DISCONTINUED | OUTPATIENT
Start: 2017-02-25 | End: 2017-02-25

## 2017-02-25 RX ORDER — OXYCODONE HYDROCHLORIDE 5 MG/1
5-10 TABLET ORAL
Status: DISCONTINUED | OUTPATIENT
Start: 2017-02-25 | End: 2017-02-26 | Stop reason: HOSPADM

## 2017-02-25 RX ORDER — NALBUPHINE HYDROCHLORIDE 10 MG/ML
2.5-5 INJECTION, SOLUTION INTRAMUSCULAR; INTRAVENOUS; SUBCUTANEOUS EVERY 6 HOURS PRN
Status: DISCONTINUED | OUTPATIENT
Start: 2017-02-25 | End: 2017-02-25

## 2017-02-25 RX ORDER — NALOXONE HYDROCHLORIDE 0.4 MG/ML
.1-.4 INJECTION, SOLUTION INTRAMUSCULAR; INTRAVENOUS; SUBCUTANEOUS
Status: DISCONTINUED | OUTPATIENT
Start: 2017-02-25 | End: 2017-02-26 | Stop reason: HOSPADM

## 2017-02-25 RX ORDER — SODIUM CHLORIDE, SODIUM LACTATE, POTASSIUM CHLORIDE, CALCIUM CHLORIDE 600; 310; 30; 20 MG/100ML; MG/100ML; MG/100ML; MG/100ML
INJECTION, SOLUTION INTRAVENOUS CONTINUOUS
Status: DISCONTINUED | OUTPATIENT
Start: 2017-02-25 | End: 2017-02-25

## 2017-02-25 RX ORDER — ACETAMINOPHEN 325 MG/1
650 TABLET ORAL EVERY 4 HOURS PRN
Status: DISCONTINUED | OUTPATIENT
Start: 2017-02-25 | End: 2017-02-26 | Stop reason: HOSPADM

## 2017-02-25 RX ORDER — MISOPROSTOL 200 UG/1
400 TABLET ORAL
Status: DISCONTINUED | OUTPATIENT
Start: 2017-02-25 | End: 2017-02-26 | Stop reason: HOSPADM

## 2017-02-25 RX ORDER — LIDOCAINE 40 MG/G
CREAM TOPICAL
Status: DISCONTINUED | OUTPATIENT
Start: 2017-02-25 | End: 2017-02-25

## 2017-02-25 RX ORDER — FENTANYL CITRATE 50 UG/ML
50-100 INJECTION, SOLUTION INTRAMUSCULAR; INTRAVENOUS
Status: DISCONTINUED | OUTPATIENT
Start: 2017-02-25 | End: 2017-02-25

## 2017-02-25 RX ORDER — IBUPROFEN 800 MG/1
800 TABLET, FILM COATED ORAL
Status: COMPLETED | OUTPATIENT
Start: 2017-02-25 | End: 2017-02-25

## 2017-02-25 RX ORDER — OXYTOCIN/0.9 % SODIUM CHLORIDE 30/500 ML
340 PLASTIC BAG, INJECTION (ML) INTRAVENOUS CONTINUOUS PRN
Status: DISCONTINUED | OUTPATIENT
Start: 2017-02-25 | End: 2017-02-26 | Stop reason: HOSPADM

## 2017-02-25 RX ORDER — HYDROMORPHONE HYDROCHLORIDE 1 MG/ML
0.5 INJECTION, SOLUTION INTRAMUSCULAR; INTRAVENOUS; SUBCUTANEOUS ONCE
Status: COMPLETED | OUTPATIENT
Start: 2017-02-25 | End: 2017-02-25

## 2017-02-25 RX ORDER — BISACODYL 10 MG
10 SUPPOSITORY, RECTAL RECTAL DAILY PRN
Status: DISCONTINUED | OUTPATIENT
Start: 2017-02-27 | End: 2017-02-26 | Stop reason: HOSPADM

## 2017-02-25 RX ORDER — IBUPROFEN 400 MG/1
400-800 TABLET, FILM COATED ORAL EVERY 6 HOURS PRN
Status: DISCONTINUED | OUTPATIENT
Start: 2017-02-25 | End: 2017-02-26 | Stop reason: HOSPADM

## 2017-02-25 RX ORDER — ONDANSETRON 4 MG/1
4 TABLET, ORALLY DISINTEGRATING ORAL EVERY 6 HOURS PRN
Status: DISCONTINUED | OUTPATIENT
Start: 2017-02-25 | End: 2017-02-25

## 2017-02-25 RX ORDER — OXYTOCIN/0.9 % SODIUM CHLORIDE 30/500 ML
100-340 PLASTIC BAG, INJECTION (ML) INTRAVENOUS CONTINUOUS PRN
Status: COMPLETED | OUTPATIENT
Start: 2017-02-25 | End: 2017-02-25

## 2017-02-25 RX ORDER — EPHEDRINE SULFATE 50 MG/ML
INJECTION, SOLUTION INTRAMUSCULAR; INTRAVENOUS; SUBCUTANEOUS
Status: DISCONTINUED
Start: 2017-02-25 | End: 2017-02-25 | Stop reason: HOSPADM

## 2017-02-25 RX ADMIN — IBUPROFEN 800 MG: 800 TABLET, FILM COATED ORAL at 18:25

## 2017-02-25 RX ADMIN — FENTANYL CITRATE 100 MCG: 50 INJECTION INTRAMUSCULAR; INTRAVENOUS at 12:53

## 2017-02-25 RX ADMIN — OXYTOCIN-SODIUM CHLORIDE 0.9% IV SOLN 30 UNIT/500ML 1 MILLI-UNITS/MIN: 30-0.9/5 SOLUTION at 09:04

## 2017-02-25 RX ADMIN — SODIUM CHLORIDE, POTASSIUM CHLORIDE, SODIUM LACTATE AND CALCIUM CHLORIDE 1000 ML: 600; 310; 30; 20 INJECTION, SOLUTION INTRAVENOUS at 12:52

## 2017-02-25 RX ADMIN — Medication: at 13:35

## 2017-02-25 RX ADMIN — HYDROMORPHONE HYDROCHLORIDE 0.5 MG: 10 INJECTION, SOLUTION INTRAMUSCULAR; INTRAVENOUS; SUBCUTANEOUS at 13:27

## 2017-02-25 RX ADMIN — SODIUM CHLORIDE, POTASSIUM CHLORIDE, SODIUM LACTATE AND CALCIUM CHLORIDE: 600; 310; 30; 20 INJECTION, SOLUTION INTRAVENOUS at 08:23

## 2017-02-25 RX ADMIN — ONDANSETRON 4 MG: 2 INJECTION INTRAMUSCULAR; INTRAVENOUS at 15:25

## 2017-02-25 RX ADMIN — MISOPROSTOL 800 MCG: 200 TABLET ORAL at 18:09

## 2017-02-25 RX ADMIN — OXYTOCIN-SODIUM CHLORIDE 0.9% IV SOLN 30 UNIT/500ML 340 ML/HR: 30-0.9/5 SOLUTION at 18:05

## 2017-02-25 NOTE — IP AVS SNAPSHOT
MRN:[id number]                      After Visit Summary   2/25/2017    Carolyn Jacobs    MRN: [id number]           Thank you!     Thank you for choosing [facility] for your care. Our goal is always to provide you with excellent care. Hearing back from our patients is one way we can continue to improve our services. Please take a few minutes to complete the written survey that you may receive in the mail after you visit. If you would like to speak to someone directly about your visit please contact Patient Relations at [phone]. Thank you!          Patient Information     Date Of Birth          1990        About your hospital stay     You were admitted on:  February 25, 2017 You last received care in the:  [facility] Postpartum    You were discharged on:  February 26, 2017       Who to Call     For medical emergencies, please call 911.  For non-urgent questions about your medical care, please call your primary care provider or clinic, [phone]          Attending Provider     Provider Specialty    Catarina Rivera MD OB/Gyn       Primary Care Provider Office Phone # Fax #    Tanisha SALAZAR Amaya [phone]       [facility] [address] [place] [address]        Further instructions from your care team       Follow up in 1-2 weeks   Call [phone] or [phone] for appointment     Call and ask to be seen or talk to a doctor for the following: (You can go to the ob triage button   On answering service line) .     Increased bleeding, fever, general unwell feeling or increased pain.     Dr. Vicky Dior, DO    OB/GYN   [facility]    Postpartum Vaginal Delivery Instructions    [facility] Referral: [phone]  [facility] Lactation Consultant: [phone]    Activity       Ask family and friends for help when you need it.    Do not place anything in your vagina for 6 weeks.    You  are not restricted on other activities, but take it easy for a few weeks to allow your body to recover from delivery.  You are able to do any activities you feel up to that point.    No driving until you have stopped taking your pain medications (usually two weeks after delivery).     Call your health care provider if you have any of these symptoms:       Increased pain, swelling, redness, or fluid around your stiches from an episiotomy or perineal tear.    A fever above 100.4 F (38 C) with or without chills when placing a thermometer under your tongue.    You soak a sanitary pad with blood within 1 hour, or you see blood clots larger than a golf ball.    Bleeding that lasts more than 6 weeks.    Vaginal discharge that smells bad.    Severe pain, cramping or tenderness in your lower belly area.    A need to urinate more frequently (use the toilet more often), more urgently (use the toilet very quickly), or it burns when you urinate.    Nausea and vomiting.    Redness, swelling or pain around a vein in your leg.    Problems breastfeeding or a red or painful area on your breast.    Chest pain and cough or are gasping for air.    Problems coping with sadness, anxiety, or depression.  If you have any concerns about hurting yourself or the baby, call your provider immediately.     You have questions or concerns after you return home.     Keep your hands clean:  Always wash your hands before touching your perineal area and stitches.  This helps reduce your risk of infection.  If your hands aren't dirty, you may use an alcohol hand-rub to clean your hands. Keep your nails clean and short.            Pending Results     No orders found for last 3 day(s).            Statement of Approval     Ordered          02/26/17 1114  I have reviewed and agree with all the recommendations and orders detailed in this document.  EFFECTIVE NOW     Approved and electronically signed by:  Vicky Dior, DO [place]  "Information     Date & Time Provider Department Dept. Phone    2/25/2017 Catarina Rivera MD [facility] Postpartum [phone]      Your Vitals Were     Blood Pressure Pulse Temperature Respirations Height Last Period    108/62 59 97.7  F (36.5  C) (Oral) 16 1.626 m (5' 4\") 04/30/2016    BMI (Body Mass Index)                   31.79 kg/m2           [employer] Information     [employer] gives you secure access to your electronic health record. If you see a primary care provider, you can also send messages to your care team and make appointments. If you have questions, please call your primary care clinic.  If you do not have a primary care provider, please call [phone] and they will assist you.        Care EveryWhere ID     This is your Care EveryWhere ID. This could be used by other organizations to access your [place] medical records  [id number]           Review of your medicines      START taking        Dose / Directions    ferrous sulfate 325 (65 FE) MG tablet   Commonly known as:  IRON        Dose:  325 mg   Take 1 tablet (325 mg) by mouth daily (with breakfast)   Quantity:  30 tablet   Refills:  2       ibuprofen 800 MG tablet   Commonly known as:  ADVIL/MOTRIN   Notes to Patient:  Take with food to prevent stomach ulcers; alternate with Tylenol        Dose:  800 mg   Take 1 tablet (800 mg) by mouth every 8 hours as needed for moderate pain   Quantity:  90 tablet   Refills:  1       oxyCODONE 5 MG IR tablet   Commonly known as:  ROXICODONE   Notes to Patient:  Take with stool softeners to prevent straining and constipation        Dose:  5 mg   Take 1 tablet (5 mg) by mouth every 6 hours as needed for pain maximum 4 tablet(s) per day, usp sparingly and no driving while on narcotic medication.   Quantity:  18 tablet   Refills:  0         CONTINUE these medicines which have NOT CHANGED        Dose / Directions    PRENATAL VIT-FE BISG-FA-DHA PO        Refills:  0            Where to get your medicines    "   These medications were sent to [place], [place] - [address] [facility]  [address] [facility] [address]     Phone:  [phone]     ferrous sulfate 325 (65 FE) MG tablet    ibuprofen 800 MG tablet         Some of these will need a paper prescription and others can be bought over the counter. Ask your nurse if you have questions.     Bring a paper prescription for each of these medications     oxyCODONE 5 MG IR tablet                Protect others around you: Learn how to safely use, store and throw away your medicines at www.disposemymeds.org.             Medication List: This is a list of all your medications and when to take them. Check marks below indicate your daily home schedule. Keep this list as a reference.      Medications           Morning Afternoon Evening Bedtime As Needed    ferrous sulfate 325 (65 FE) MG tablet   Commonly known as:  IRON   Take 1 tablet (325 mg) by mouth daily (with breakfast)                                   ibuprofen 800 MG tablet   Commonly known as:  ADVIL/MOTRIN   Take 1 tablet (800 mg) by mouth every 8 hours as needed for moderate pain   Last time this was given:  800 mg on 2/26/2017 12:01 PM   Notes to Patient:  Take with food to prevent stomach ulcers; alternate with Tylenol                                   oxyCODONE 5 MG IR tablet   Commonly known as:  ROXICODONE   Take 1 tablet (5 mg) by mouth every 6 hours as needed for pain maximum 4 tablet(s) per day, usp sparingly and no driving while on narcotic medication.   Last time this was given:  5 mg on 2/26/2017  3:21 PM   Notes to Patient:  Take with stool softeners to prevent straining and constipation                                   PRENATAL VIT-FE BISG-FA-DHA PO

## 2017-02-25 NOTE — ANESTHESIA PROCEDURE NOTES
Peripheral nerve/Neuraxial procedure note : epidural catheter  Pre-Procedure  Performed by PEDRO PABLO OCONNELL  Location: OB, floor    Procedure Times:2/25/2017 1:14 PM and 2/25/2017 1:32 PM  Pre-Anesthestic Checklist: patient identified, IV checked, risks and benefits discussed, informed consent, monitors and equipment checked, pre-op evaluation and at physician/surgeon's request    Timeout  Correct Patient: Yes   Correct Procedure: Yes   Correct Site: Yes   Correct Laterality: N/A   Correct Position: Yes   Site Marked: No   .   Procedure Documentation    .    Procedure:    Epidural catheter.  Insertion Site:L3-4  (midline approach) Injection technique: LORT saline   Local skin infiltrated with 3 mL of 1% lidocaine.  GARIMA at 5 cm     Patient Prep;mask, sterile gloves, povidone-iodine 7.5% surgical scrub, patient draped.  .  Needle: Tara Toth needle (17 G. 3.5 in). # of attempts: 1. # of redirects:.  Spinal Needle: . . . Catheter: 19 G . .  Catheter threaded easily  5 cm epidural space.  10 cm at skin.   .    Assessment/Narrative  Paresthesias: No.  .  .  Aspiration negative for heme or CSF  . Test dose of 5 mL lidocaine 1.5% w/ 1:200,000 epinephrine at 13:27.  Test dose negative for signs of intravascular, subdural or intrathecal injection. Comments:  I or my partner am immediately available. I or my partner will monitor the patient and supervise nursing care at necessary intervals.    Given 10 ml 0.125% bupivicaine infusion with 0.5 mg hydromorphone.

## 2017-02-25 NOTE — PROGRESS NOTES
"Intrapartum progress note:    S: Patient resting comfortably, mild cramping, not feeling contractions.     O:   Vitals:    17 0803 17 0949 17 0950 17 1022   BP:  120/59 120/59 108/72   Resp:  16  16   Temp:       TempSrc:       Height: 1.626 m (5' 4\")         Gen: comfortable  Cx: 5/6-/-2, posterior, medium  FHT: baseline 130, moderate variability, + accels, no decels  Pinal: Q 5-8 min  EFW 8lb    AROM with clear fluid    A/P: Carolyn Jacobs is a 26 year old female  at 40w1d here for elective IOL with favorable cervix  - FWB: Cat I tracing, reactive  - Labor augmentation: s/p AROM, continue pitocin augmentation  - Analgesia: per patient  - Anticipate RICARDO Rivera 2017 10:41 AM    "

## 2017-02-25 NOTE — IP AVS SNAPSHOT
[facility]    [address]    [facility] [address]    Phone:  [phone]    Fax:  [phone]                                       After Visit Summary   2/25/2017    Carolyn Jacobs    MRN: [id number]           After Visit Summary Signature Page     I have received my discharge instructions, and my questions have been answered. I have discussed any challenges I see with this plan with the nurse or doctor.    ..........................................................................................................................................  Patient/Patient Representative Signature      ..........................................................................................................................................  Patient Representative Print Name and Relationship to Patient    ..................................................               ................................................  Date                                            Time    ..........................................................................................................................................  Reviewed by Signature/Title    ...................................................              ..............................................  Date                                                            Time

## 2017-02-25 NOTE — ANESTHESIA PREPROCEDURE EVALUATION
PAC NOTE:       ANESTHESIA PRE EVALUATION:  Anesthesia Evaluation       history and physical reviewed .        ROS/MED HX    ENT/Pulmonary:  - neg pulmonary ROS     Neurologic:  - neg neurologic ROS     Cardiovascular:  - neg cardiovascular ROS       METS/Exercise Tolerance:     Hematologic:         Musculoskeletal:         GI/Hepatic:  - neg GI/hepatic ROS       Renal/Genitourinary:         Endo:         Psychiatric:         Infectious Disease:         Malignancy:         Other:               Physical Exam      Airway     Dental     Cardiovascular       Pulmonary     Other findings:  at term, in labor, requesting pain  management          anxiety      Anesthesia Plan      History & Physical Review      ASA Status:  .  OB Epidural Asa: 2       Plan for     Discussed risks of epidural catheter placement, including, but not limited to, bruising/bleeding, infection, pain, failure of epidural medications to relieve pain, dural puncture with subsequent headache/ need for epidural blood patch and nerve damage.  All questions answered, understanding voiced and she wishes to proceed.      Postoperative Care      Consents  Anesthetic plan, risks, benefits and alternatives discussed with:  Patient..                            .

## 2017-02-25 NOTE — PLAN OF CARE
Patient here for an elective induction at 40.1 weeks pregnant. She is a . Admission intake completed, external monitors applied. Patient states she feels a little anxious but will be fine. Her spouse is at home with her 2 year old and he will come later after she is in labor.

## 2017-02-26 VITALS
TEMPERATURE: 97.8 F | BODY MASS INDEX: 31.79 KG/M2 | DIASTOLIC BLOOD PRESSURE: 68 MMHG | SYSTOLIC BLOOD PRESSURE: 119 MMHG | HEIGHT: 64 IN | HEART RATE: 65 BPM | RESPIRATION RATE: 16 BRPM

## 2017-02-26 LAB
HGB BLD-MCNC: 10.1 G/DL (ref 11.7–15.7)
T PALLIDUM IGG+IGM SER QL: NEGATIVE

## 2017-02-26 PROCEDURE — 36415 COLL VENOUS BLD VENIPUNCTURE: CPT | Performed by: OBSTETRICS & GYNECOLOGY

## 2017-02-26 PROCEDURE — 85018 HEMOGLOBIN: CPT | Performed by: OBSTETRICS & GYNECOLOGY

## 2017-02-26 PROCEDURE — 25000132 ZZH RX MED GY IP 250 OP 250 PS 637: Performed by: OBSTETRICS & GYNECOLOGY

## 2017-02-26 RX ORDER — FERROUS SULFATE 325(65) MG
325 TABLET ORAL
Qty: 30 TABLET | Refills: 2 | Status: SHIPPED | OUTPATIENT
Start: 2017-02-26 | End: 2017-04-06

## 2017-02-26 RX ORDER — OXYCODONE HYDROCHLORIDE 5 MG/1
5 TABLET ORAL EVERY 6 HOURS PRN
Qty: 18 TABLET | Refills: 0 | Status: SHIPPED | OUTPATIENT
Start: 2017-02-26 | End: 2017-04-06

## 2017-02-26 RX ORDER — IBUPROFEN 800 MG/1
800 TABLET, FILM COATED ORAL EVERY 8 HOURS PRN
Qty: 90 TABLET | Refills: 1 | Status: SHIPPED | OUTPATIENT
Start: 2017-02-26 | End: 2018-05-23

## 2017-02-26 RX ADMIN — IBUPROFEN 800 MG: 400 TABLET ORAL at 12:01

## 2017-02-26 RX ADMIN — ACETAMINOPHEN 650 MG: 325 TABLET, FILM COATED ORAL at 05:16

## 2017-02-26 RX ADMIN — SENNOSIDES AND DOCUSATE SODIUM 1 TABLET: 8.6; 5 TABLET ORAL at 09:38

## 2017-02-26 RX ADMIN — OXYCODONE HYDROCHLORIDE 5 MG: 5 TABLET ORAL at 15:21

## 2017-02-26 RX ADMIN — OXYCODONE HYDROCHLORIDE 5 MG: 5 TABLET ORAL at 12:01

## 2017-02-26 RX ADMIN — IBUPROFEN 800 MG: 400 TABLET ORAL at 00:44

## 2017-02-26 RX ADMIN — IBUPROFEN 800 MG: 400 TABLET ORAL at 06:50

## 2017-02-26 RX ADMIN — ACETAMINOPHEN 650 MG: 325 TABLET, FILM COATED ORAL at 09:38

## 2017-02-26 NOTE — LACTATION NOTE
Lactation visit.  Mother verbalizes breastfeeding is going well.  [place] latches very well on L side, mother has some difficulty getting  latched well on R side.  Educated on football hold and other breastfeeding positions that may help with latching.  Encouraged mom to call for assistance when ready to feed.

## 2017-02-26 NOTE — PLAN OF CARE
Problem: Goal Outcome Summary  Goal: Goal Outcome Summary  Outcome: Improving  Stable patient meeting expected goals.  Medicated with Ibuprofen this shift for pain.  Perineum intact, ice applied for comfort.  Patient had difficulty initially voiding and after taking PO fluids was able to void large quantity on second attempt-fundus remains firm and midline.  Up and ambulating in room independently.  Pt asking appropriate questions and states understanding of teaching.

## 2017-02-26 NOTE — DISCHARGE INSTRUCTIONS
Follow up in 1-2 weeks   Call [phone] or [phone] for appointment     Call and ask to be seen or talk to a doctor for the following: (You can go to the ob triage button   On answering service line) .     Increased bleeding, fever, general unwell feeling or increased pain.     Dr. Vicky Dior, DO    OB/GYN   [facility]    Postpartum Vaginal Delivery Instructions    [facility] Referral: [phone]  [facility] Lactation Consultant: [phone]    Activity       Ask family and friends for help when you need it.    Do not place anything in your vagina for 6 weeks.    You are not restricted on other activities, but take it easy for a few weeks to allow your body to recover from delivery.  You are able to do any activities you feel up to that point.    No driving until you have stopped taking your pain medications (usually two weeks after delivery).     Call your health care provider if you have any of these symptoms:       Increased pain, swelling, redness, or fluid around your stiches from an episiotomy or perineal tear.    A fever above 100.4 F (38 C) with or without chills when placing a thermometer under your tongue.    You soak a sanitary pad with blood within 1 hour, or you see blood clots larger than a golf ball.    Bleeding that lasts more than 6 weeks.    Vaginal discharge that smells bad.    Severe pain, cramping or tenderness in your lower belly area.    A need to urinate more frequently (use the toilet more often), more urgently (use the toilet very quickly), or it burns when you urinate.    Nausea and vomiting.    Redness, swelling or pain around a vein in your leg.    Problems breastfeeding or a red or painful area on your breast.    Chest pain and cough or are gasping for air.    Problems coping with sadness, anxiety, or depression.  If you have any concerns about hurting yourself or the baby, call your provider immediately.     You have  questions or concerns after you return home.     Keep your hands clean:  Always wash your hands before touching your perineal area and stitches.  This helps reduce your risk of infection.  If your hands aren't dirty, you may use an alcohol hand-rub to clean your hands. Keep your nails clean and short.

## 2017-02-26 NOTE — PLAN OF CARE
"EPDS score of 16. Pt denies depression or hx of post partum depression. Pt states she has a lot of anxiety and has \"freak outs\" where she becomes anxious about something bad happening to her son, Ian (age 2 1/2). States her son is what she uses to \"ground\" herself and can calm down by holding him and realizing he's okay. She denies wanting to self harm or harm her child. States she has a very supportive family. She lives with her mom, sister, boyfriend, and 2 1/2 yr old son and now this new baby, Barbra. Her son was recently diagnosed with Autism and attends intense therapy at [place] several times a week. 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 Pt states her relationship with her boyfriend is stable. Social work consult ordered.   "

## 2017-02-26 NOTE — DISCHARGE SUMMARY
26 year old  y/o  s/p  ppd # 1  hemoglobin is   Hemoglobin   Date Value Ref Range Status   2017 10.1 (L) 11.7 - 15.7 g/dL Final   ]    d/c home   On colace, breast pump and ibuprofen ferrous sulfate oxycodone   Follow-up in 6 weeks for post partum examination    [facility]   Post-Partum Progress Note          Assessment and Plan:    Assessment:   Post-partum day #1  Normal spontaneous vaginal delivery  L&D complications: None      Doing well.  Pain well-controlled.      Plan:   Ambulation encouraged  Discharge later today           Interval History:   Doing well.  Pain is well-controlled.  No fevers.  No history of foul-smelling vaginal discharge.  Good appetite.  Denies chest pain, shortness of breath, nausea or vomiting.  Vaginal bleeding is similar to a heavy menstrual flow.  Ambulatory.  Breastfeeding well.          Significant Problems:    None          Review of Systems:    The patient denies any chest pain, shortness of breath, excessive pain, fever, chills, purulent drainage from the wound, nausea or vomiting.          Medications:   All medications related to the patient's surgery have been reviewed          Physical Exam:     All vitals stable  Patient Vitals for the past 8 hrs:   BP Temp Temp src Pulse Resp   17 0836 (!) 88/51 97.7  F (36.5  C) Oral 62 16   17 0421 100/60 98.1  F (36.7  C) Oral 64 18     Uterine fundus is firm, non-tender and at the level of the umbilicus          Data:     All laboratory data related to this surgery reviewed  Hemoglobin   Date Value Ref Range Status   2017 10.1 (L) 11.7 - 15.7 g/dL Final   2016 10.5 (L) 11.7 - 15.7 g/dL Final   2016 12.3 11.7 - 15.7 g/dL Final   2015 12.1 11.7 - 15.7 g/dL Final   2014 11.9 11.7 - 15.7 g/dL Final       Vicky Dior, DO      Dr. Vicky Dior, DO    OB/GYN   [facility]

## 2017-02-26 NOTE — PLAN OF CARE
Data: Carolyn Jacobs transferred to [facility] via wheelchair at 2025. Baby transferred via parent's arms.  Action: Receiving unit notified of transfer: Yes. Patient and family notified of room change. Belongings sent to receiving unit. Accompanied by Registered Nurse. Oriented patient to surroundings. Call light within reach. ID bands double-checked with receiving RN.  Response: Patient tolerated transfer and is stable.

## 2017-02-26 NOTE — ANESTHESIA POSTPROCEDURE EVALUATION
Patient: Carolyn Jacobs    * No procedures listed *    Diagnosis:* No pre-op diagnosis entered *  Diagnosis Additional Information: No value filed.    Anesthesia Type:  No value filed.    Note:  Anesthesia Post Evaluation         Comments:     S/P epidural for labor.   I or my partner was immediately available for management of this patient during epidural analgesia infusion.  VSS.  Doing well. Block resolved.  Neuro at baseline. Denies positional headache. Minimal side effects easily managed w/ PRN meds. No apparent anesthetic complications. No follow-up required.    Yogesh Kingsley MD          Last vitals:  Vitals:    02/26/17 0421 02/26/17 0836 02/26/17 1201   BP: 100/60 (!) 88/51 108/62   Pulse: 64 62 59   Resp: 18 16    Temp: 98.1  F (36.7  C) 97.7  F (36.5  C)          Electronically Signed By: Yogesh Kingsley MD  February 26, 2017  4:30 PM

## 2017-02-26 NOTE — H&P
No significant change in general health status based on examination of the patient, review of Nursing Admission Database and prenatal record.    Carolyn Jacobs is a 26 year old female  at 40w1d here for elective IOL with a favorable cervix.   EFW 8lb    1)admit to L&D  2). Augment labor with pitocin per protocol and AROM.  3) comfort measures when appropriate    Catarina Rivera MD

## 2017-02-26 NOTE — L&D DELIVERY NOTE
Delivery Summary    Carolyn Jacobs MRN# [id number]   Age: 26 year old YOB: 1990     Patient was admitted for elective labor augmentation with pitocin and AROM. The patient was complete and pushing. Infant's head was delivered atraumatically over perineum in the PILI position. Nuchal cord absent. Anterior shoulder and posterior shoulders were easily delivered without problems followed by remainder of infant. Infant vigorous and crying. Drying and resuscitative measures performed. Delayed cord clamping was performed prior to cutting. Cord gases were not obtained. Cord blood was collected. Pitocin in IV fluid was administered per protocol. The placenta delivered spontaneously intact with 3 vessel cord and revealed normal anatomy. Uterine massage was performed and the fundus was found to be firm. Vagina, cervix, and perineum were inspected for lacerations. No laceration was noted.  . 800mcg cytotec administered per rectum. All counts were correct. Mom and baby stable in room.      Catarina Rivera MD      Labor Event Times    Labor onset date:  17 Onset time:  11:30 AM   Dilation complete date:  17 Complete time:   5:15 PM   Start pushing date/time:  2017 1745            Labor Events     labor?:  No    steroids:  None   Labor Type:  Induction, AROM   Predominate monitoring during 1st stage:  continuous electronic fetal monitoring      Antibiotics received during labor?:  No      Rupture identifier:  Rupture 1   Rupture date/time: 17 1038   Rupture type:  Artificial Rupture of Membranes   Fluid color:  Clear   Fluid odor:  Normal      Induction:  Oxytocin, AROM   Induction date/time: 17 0800   Cervical ripening date/time:     Indications for induction:  Elective, Post-term Gestation      1:1 continuous labor support provided by?:  RN Labor partogram used?:  yes         Delivery/Placenta Date and Time    Delivery Date:  17 Delivery Time:   6:00 PM    Placenta Date/Time:  2/25/2017  6:03 PM   Oxytocin given at the time of delivery:  after delivery of placenta      Vaginal Counts    Initial count performed by 2 team members:   Two Team Members   CONCHITA Verma Dr.          Needles Suture [place] [employer]   Initial counts 2  5    Added to count       Final counts          Placed during labor Accounted for at the end of labor   No    No    No                Apgars    Living status:  Yes    1 Minute 5 Minute 10 Minute 15 Minute 20 Minute   Skin color: 0  1       Heart rate: 2  2       Reflex irritability: 2  2       Muscle tone: 2  2       Respiratory effort: 2  2       Total: 8  9          Apgars assigned by:  SHERON SHIELDS RN      Cord    Vessels:  3 Vessels    Cord Blood Disposition:  Lab          Labor Events and Shoulder Dystocia    Fetal Tracing Prior to Delivery:  Category 1   Shoulder dystocia present?:  Neg            Delivery (Maternal) (Provider to Complete) ([id number])    Episiotomy:  None   Perineal lacerations:  None    Vaginal delivery est. blood loss (mL):  400         Mother's Information  Mother: Carolyn Jacobs #[id number]    Start of Mother's Information     IO Blood Loss  02/25/17 1130 - 02/25/17 1818    Mom's I/O Activity            End of Mother's Information  Mother: Carolyn Jacobs #[id number]            Delivery - Provider to Complete ([id number])    Delivering clinician:  CATARINA RIVEAR   Attempted Delivery Types (Choose all that apply):  Spontaneous Vaginal Delivery   Delivery Type (Choose the 1 that will go to the Birth History):  Vaginal, Spontaneous Delivery                           Placenta    Delayed Cord Clamping:  Done   Date/Time:  2/25/2017  6:03 PM   Removal:  Expressed   Disposition:  Hospital disposal      Anesthesia    Method:  Epidural         Presentation and Position    Presentation:  Vertex   Position:  Right Occiput Anterior                    Catarina Rivera MD

## 2017-02-26 NOTE — CONSULTS
"SWS Note    Data: Per SWS consult order, SW to see pt regarding [place] score of 16.    Intervention: SW met with pt to postpartum depression and anxiety.    Assessment: Pt had visitors, they excused themselves. Pt began nursing baby. Baby latched on right away.   Pt has a history of anxiety. Pt mainly has worry thoughts that she describes as sometimes irrational fear about something \"happening\" to her son (Ian 2.4 yo)  Pt reported being on ativan and prozac last winter but stopped using them after about a month because she didn't want to be on medication.  Pt's primary doctor is aware of her anxiety and they talked about possibly following up with medication. Pt has not had counseling or therapy but is familiar with how to connect with services.    Pt lives with her significant other and her sister. Pt feels like she has adequate supports.    Pt feels safe at home.     SW discussed postpartum depression and anxiety symptoms.     Pt denies any concerns about bringing a new baby home.     Plan: Pt plans to d/c this evening around 6:00pm.     LISA Solis  Casual SW [phone]     "

## 2017-02-27 NOTE — PLAN OF CARE
Problem: Goal Outcome Summary  Goal: Goal Outcome Summary  Outcome: Improving  Pt meeting outcomes for discharge. EPDS score of 16, social work saw pt and everything ok. Pt knows to retest herself in 1-2 weeks and knows to call her OB if symptoms worsen. Breastfeeding very frequently most of the day. Latch score of 10. Good pain relief with available meds.     Discharge instructions reviewed by Lori BONILLA LPN and all questions answered. Home meds and breast pump given at discharge. Home care ordered due to early discharge. Discharged home at 1900.

## 2017-02-27 NOTE — PLAN OF CARE
Patient is discharging in stable condition to home with baby and fob.  Discharge instructions, medications, and breast pump given and reviewed with no further questions at this time.  Home care referral faxed; phone number provided.  ID bands verified.

## 2017-03-07 ENCOUNTER — OFFICE VISIT (OUTPATIENT)
Dept: OBGYN | Facility: CLINIC | Age: 27
End: 2017-03-07

## 2017-03-07 ENCOUNTER — TELEPHONE (OUTPATIENT)
Dept: OBGYN | Facility: CLINIC | Age: 27
End: 2017-03-07

## 2017-03-07 VITALS
TEMPERATURE: 97.9 F | SYSTOLIC BLOOD PRESSURE: 100 MMHG | WEIGHT: 168.6 LBS | HEIGHT: 64 IN | BODY MASS INDEX: 28.79 KG/M2 | DIASTOLIC BLOOD PRESSURE: 60 MMHG

## 2017-03-07 DIAGNOSIS — Z53.9 ERRONEOUS ENCOUNTER--DISREGARD: Primary | ICD-10-CM

## 2017-03-07 ASSESSMENT — ANXIETY QUESTIONNAIRES
7. FEELING AFRAID AS IF SOMETHING AWFUL MIGHT HAPPEN: NEARLY EVERY DAY
IF YOU CHECKED OFF ANY PROBLEMS ON THIS QUESTIONNAIRE, HOW DIFFICULT HAVE THESE PROBLEMS MADE IT FOR YOU TO DO YOUR WORK, TAKE CARE OF THINGS AT HOME, OR GET ALONG WITH OTHER PEOPLE: VERY DIFFICULT
GAD7 TOTAL SCORE: 15
2. NOT BEING ABLE TO STOP OR CONTROL WORRYING: MORE THAN HALF THE DAYS
6. BECOMING EASILY ANNOYED OR IRRITABLE: NEARLY EVERY DAY
3. WORRYING TOO MUCH ABOUT DIFFERENT THINGS: MORE THAN HALF THE DAYS
5. BEING SO RESTLESS THAT IT IS HARD TO SIT STILL: SEVERAL DAYS
1. FEELING NERVOUS, ANXIOUS, OR ON EDGE: MORE THAN HALF THE DAYS

## 2017-03-07 ASSESSMENT — PATIENT HEALTH QUESTIONNAIRE - PHQ9: 5. POOR APPETITE OR OVEREATING: MORE THAN HALF THE DAYS

## 2017-03-07 NOTE — MR AVS SNAPSHOT
After Visit Summary   3/7/2017    Carolyn Jacobs    MRN: [id number]           Patient Information     Date Of Birth          1990        Visit Information        Provider Department      3/7/2017 3:00 PM Valdemar Martinez MD [facility]        Today's Diagnoses     ERRONEOUS ENCOUNTER--DISREGARD    -  1       Follow-ups after your visit        Your next 10 appointments already scheduled     Mar 13, 2017 11:15 AM CDT   [facility] with [facility]dem[facility]    [address]  [facility] [id number] [phone]            Mar 14, 2017  1:30 PM CDT   New Visit with SARAHI Martínez   [facility]    [address]  [facility] [id number] [phone]              Who to contact     If you have questions or need follow up information about today's clinic visit or your schedule please contact [facility] directly at [phone].  Normal or non-critical lab and imaging results will be communicated to you by "[employer]hart, letter or phone within 4 business days after the clinic has received the results. If you do not hear from us within 7 days, please contact the clinic through [place] or phone. If you have a critical or abnormal lab result, we will notify you by phone as soon as possible.  Submit refill requests through agreement24 [email or web address] or call your pharmacy and they will forward the refill request to us. Please allow 3 business days for your refill to be completed.          Additional Information About Your Visit        "[employer]hart Information     agreement24 [email or web address] gives you secure access to your electronic health record. If you see a primary care provider, you can also send messages to your care team and make appointments. If you have questions, please call your primary care clinic.  If you do not have a primary care provider, please call [phone] and they will  "assist you.        Care EveryWhere ID     This is your Care EveryWhere ID. This could be used by other organizations to access your [facility] medical records  [id number]        Your Vitals Were     Temperature Height Last Period BMI (Body Mass Index)          97.9  F (36.6  C) (Oral) 5' 4\" (1.626 m) 04/30/2016 28.94 kg/m2         Blood Pressure from Last 3 Encounters:   03/07/17 100/60   02/26/17 119/68   02/24/17 102/70    Weight from Last 3 Encounters:   03/07/17 168 lb 9.6 oz (76.5 kg)   02/24/17 185 lb 3.2 oz (84 kg)   02/17/17 184 lb 8 oz (83.7 kg)              Today, you had the following     No orders found for display       Primary Care Provider Office Phone # Fax #    Tanisha Amaya [phone]       [facility] [address] [place] [id number]        Thank you!     Thank you for choosing [facility]  for your care. Our goal is always to provide you with excellent care. Hearing back from our patients is one way we can continue to improve our services. Please take a few minutes to complete the written survey that you may receive in the mail after your visit with us. Thank you!             Your Updated Medication List - Protect others around you: Learn how to safely use, store and throw away your medicines at www.disposemymeds.org.          This list is accurate as of: 3/7/17 11:59 PM.  Always use your most recent med list.                   Brand Name Dispense Instructions for use    ferrous sulfate 325 (65 FE) MG tablet    IRON    30 tablet    Take 1 tablet (325 mg) by mouth daily (with breakfast)       ibuprofen 800 MG tablet    ADVIL/MOTRIN    90 tablet    Take 1 tablet (800 mg) by mouth every 8 hours as needed for moderate pain       oxyCODONE 5 MG IR tablet    ROXICODONE    18 tablet    Take 1 tablet (5 mg) by mouth every 6 hours as needed for pain maximum 4 tablet(s) per day, usp sparingly and no driving while on narcotic medication.       PRENATAL " VIT-FE BISG-FA-DHA PO

## 2017-03-07 NOTE — NURSING NOTE
Pt not seen by provider. Doctor called out for delivery and . Triage nurse talked with patient.  Romelia Chacon CMA

## 2017-03-07 NOTE — TELEPHONE ENCOUNTER
Pt came in today to see Dr. Martinez for post partum depression.  Dr. Martinez was called to the hospital for deliveries and  Pt couldn't wait due to needing to pa/U her other child    Glenda had roomed pt and had the pt the pt fill out a PHQ-9 with a total score of 15 and very difficult at home  REILLY-7 with a total score of 15 and very difficult at home.    I went into the room and pt started talking and crying.  She feels like every thing is so out of control.  She's been on many SSRI's prior and never felt like they help her.  She would like to have Klonopin for her anxiety and not sure what med to try for depression.    We spoke about her seeing someone for herself as far as talk psychologist.  Her 2.5 yr old son, is autistic and is not doing well with the baby (hitting mom and baby) has many appts each week related to his dx.  I told her that she needs to some things for herself, so we can help her more whole.    We gave her [facility] [address] therapist card to call her and talk.  I will also send this encounter to her.    Pharmacy listed.  Please order some meds for pt and when would you want to see her again.    Jacobo LARIOS RN

## 2017-03-07 NOTE — NURSING NOTE
"Chief Complaint   Patient presents with     Depression       Initial /60  Temp 97.9  F (36.6  C) (Oral)  Ht 5' 4\" (1.626 m)  Wt 168 lb 9.6 oz (76.5 kg)  LMP 04/30/2016  BMI 28.94 kg/m2 Estimated body mass index is 28.94 kg/(m^2) as calculated from the following:    Height as of this encounter: 5' 4\" (1.626 m).    Weight as of this encounter: 168 lb 9.6 oz (76.5 kg).  Medication Reconciliation: complete   Romelia Chacon CMA      "

## 2017-03-08 ASSESSMENT — PATIENT HEALTH QUESTIONNAIRE - PHQ9: SUM OF ALL RESPONSES TO PHQ QUESTIONS 1-9: 15

## 2017-03-08 ASSESSMENT — ANXIETY QUESTIONNAIRES: GAD7 TOTAL SCORE: 15

## 2017-03-08 NOTE — TELEPHONE ENCOUNTER
Discussed with Dr. Nicole, states [facility] ED have psych providers that are able to go to ER and assist with evaluation and medication prescribing. Doesn't feel comfortable with prescribing especially with request for klonopin.    Called pt, discussed d/t hx of unsuccessful med management of depression/anxiety best to have appt or go to ER if severe symptoms or safety concern. Offered appt with Dr. Martinez for Fri in . Pt reports she's unable to make an appt work unless it's between 1-2 PM as she does not have  for son. Pt requests appt for next week. Scheduled appt for 03/13 with Dr. Martinez. Pt states she'll attempt to find  as unable to bring both kids to an MD appt. Pt voices frustration about situation. Denies thoughts of harming self or others.

## 2017-03-08 NOTE — TELEPHONE ENCOUNTER
Pt calls, checking on status of below. Discuss Dr. Martinez out of the office today. Pt would like to start a medication that may help with her anxiety and depression symptoms sooner rather than later.     Please advise, thanks.    Routed to Dr. Nicole (on-call) as Dr. Martinez out of the office today.

## 2017-03-09 ENCOUNTER — TELEPHONE (OUTPATIENT)
Dept: OBGYN | Facility: CLINIC | Age: 27
End: 2017-03-09

## 2017-03-09 DIAGNOSIS — F41.8 POSTPARTUM ANXIETY: Primary | ICD-10-CM

## 2017-03-09 RX ORDER — CLONAZEPAM 0.5 MG/1
0.25 TABLET ORAL 2 TIMES DAILY PRN
Qty: 20 TABLET | Refills: 0 | Status: SHIPPED | OUTPATIENT
Start: 2017-03-09 | End: 2017-03-27 | Stop reason: ALTCHOICE

## 2017-03-09 NOTE — TELEPHONE ENCOUNTER
I spoke with the patient today. She is status post delivery of a healthy female infant. Her son has developmental delays is not speaking  And requires a tremendous amount of care. Her son is not doing well with a  in the house. The patient feels overwhelmed. While her mother and sister are here to help she feels anxiety and stress and at times can become overwhelming. She denies being a danger to herself or to others. In the past she has used Klonopin and Zoloft to help with her anxiety. The risks benefits and alternative forms of therapy were discussed the patient would like a small prescription for Klonopin to help her through this transition. She is amenable to starting Zoloft. The patient has an appointment with mental health next week.. Emergency helpline numbers were reviewed with her today. Risks benefits alternatives discussed. I also asked that she follow-up with internal medicine for her depressive symptoms. SHe does have an appointment to see me as well next week

## 2017-03-09 NOTE — TELEPHONE ENCOUNTER
I left a message for the pt to return my call.  I am calling to see how she is doing and f/u of her concerns.  Please try pt later today as well  Valdemar Martinez MD

## 2017-03-09 NOTE — TELEPHONE ENCOUNTER
Call back from pt. States Rx for Zoloft was received by pharmacy but Rx for Klonopin was not. Rx for Klonopin was locally printed. Call to [facility]. Requested Rx be found and faxed to pharmacy. Also requested pt be called and updated once it is done.

## 2017-03-10 NOTE — PROGRESS NOTES
I was called out to a delivery and the pt will be rescheduled with either myself or one of my partners.  I will contact the pt by phone as well  Valdemar Martinez M.D.

## 2017-03-13 ENCOUNTER — OFFICE VISIT (OUTPATIENT)
Dept: OBGYN | Facility: CLINIC | Age: 27
End: 2017-03-13
Payer: COMMERCIAL

## 2017-03-13 VITALS
WEIGHT: 166.4 LBS | SYSTOLIC BLOOD PRESSURE: 104 MMHG | TEMPERATURE: 97.7 F | DIASTOLIC BLOOD PRESSURE: 60 MMHG | HEIGHT: 64 IN | BODY MASS INDEX: 28.41 KG/M2

## 2017-03-13 DIAGNOSIS — F41.8 POSTPARTUM ANXIETY: ICD-10-CM

## 2017-03-13 PROCEDURE — 99213 OFFICE O/P EST LOW 20 MIN: CPT | Mod: 24 | Performed by: OBSTETRICS & GYNECOLOGY

## 2017-03-13 ASSESSMENT — ANXIETY QUESTIONNAIRES
6. BECOMING EASILY ANNOYED OR IRRITABLE: NEARLY EVERY DAY
7. FEELING AFRAID AS IF SOMETHING AWFUL MIGHT HAPPEN: NEARLY EVERY DAY
2. NOT BEING ABLE TO STOP OR CONTROL WORRYING: NEARLY EVERY DAY
1. FEELING NERVOUS, ANXIOUS, OR ON EDGE: NEARLY EVERY DAY
GAD7 TOTAL SCORE: 21
IF YOU CHECKED OFF ANY PROBLEMS ON THIS QUESTIONNAIRE, HOW DIFFICULT HAVE THESE PROBLEMS MADE IT FOR YOU TO DO YOUR WORK, TAKE CARE OF THINGS AT HOME, OR GET ALONG WITH OTHER PEOPLE: VERY DIFFICULT
3. WORRYING TOO MUCH ABOUT DIFFERENT THINGS: NEARLY EVERY DAY
5. BEING SO RESTLESS THAT IT IS HARD TO SIT STILL: NEARLY EVERY DAY

## 2017-03-13 ASSESSMENT — PATIENT HEALTH QUESTIONNAIRE - PHQ9: 5. POOR APPETITE OR OVEREATING: NEARLY EVERY DAY

## 2017-03-13 NOTE — MR AVS SNAPSHOT
After Visit Summary   3/13/2017    Carolyn Jacobs    MRN: [id number]           Patient Information     Date Of Birth          1990        Visit Information        Provider Department      3/13/2017 11:15 AM Valdemar Martinez MD [facility]        Today's Diagnoses     Postpartum anxiety          Care Instructions    You can reach your [employer] Care Team any time of the day by calling [phone]. This number will put you in touch with the 24 hour nurse line if the clinic is closed.    To contact your OB/GYN Station Coordinator/Surgery Scheduler please call [phone]. This is a direct number for your care team between 8 a.m. and 4 p.m. Monday through Friday.    [facility] is open for your convenience:  Monday through Friday 8 a.m. to 6 p.m.  Closed weekends and all major holidays.         Follow-ups after your visit        Your next 10 appointments already scheduled     Mar 14, 2017  1:30 PM CDT   [facility] SARAHI Mart[facility]nez   [facility]    [address] [facility] [address]  [facility] [id number] [phone]              Who to contact     If you have questions or need follow up information about today's clinic visit or your schedule please contact [facility] directly at [phone].  Normal or non-critical lab and imaging results will be communicated to you by MyChart, letter or phone within 4 business days after the clinic has received the results. If you do not hear from us within 7 days, please contact the clinic through MyChart or phone. If you have a critical or abnormal lab result, we will notify you by phone as soon as possible.  Submit refill requests through WHObyYOU or call your pharmacy and they will forward the refill request to us. Please allow 3 business days for your refill to be completed.          Additional Information About Your Visit        MyChart Information     GeeYeet  "gives you secure access to your electronic health record. If you see a primary care provider, you can also send messages to your care team and make appointments. If you have questions, please call your primary care clinic.  If you do not have a primary care provider, please call [phone] and they will assist you.        Care EveryWhere ID     This is your Care EveryWhere ID. This could be used by other organizations to access your [facility] medical records  [id number]        Your Vitals Were     Temperature Height BMI (Body Mass Index)             97.7  F (36.5  C) (Oral) 5' 4\" (1.626 m) 28.56 kg/m2          Blood Pressure from Last 3 Encounters:   03/13/17 104/60   03/07/17 100/60   02/26/17 119/68    Weight from Last 3 Encounters:   03/13/17 166 lb 6.4 oz (75.5 kg)   03/07/17 168 lb 9.6 oz (76.5 kg)   02/24/17 185 lb 3.2 oz (84 kg)              Today, you had the following     No orders found for display       Primary Care Provider Office Phone # Fax #    Tanisha Amaya [phone]       [address] [address]        Thank you!     Thank you for choosing [facility]  for your care. Our goal is always to provide you with excellent care. Hearing back from our patients is one way we can continue to improve our services. Please take a few minutes to complete the written survey that you may receive in the mail after your visit with us. Thank you!             Your Updated Medication List - Protect others around you: Learn how to safely use, store and throw away your medicines at www.disposemymeds.org.          This list is accurate as of: 3/13/17  1:42 PM.  Always use your most recent med list.                   Brand Name Dispense Instructions for use    clonazePAM 0.5 MG tablet    klonoPIN    20 tablet    Take 0.5 tablets (0.25 mg) by mouth 2 times daily as needed for anxiety       ferrous sulfate 325 (65 FE) MG tablet    IRON    30 tablet    " Take 1 tablet (325 mg) by mouth daily (with breakfast)       ibuprofen 800 MG tablet    ADVIL/MOTRIN    90 tablet    Take 1 tablet (800 mg) by mouth every 8 hours as needed for moderate pain       oxyCODONE 5 MG IR tablet    ROXICODONE    18 tablet    Take 1 tablet (5 mg) by mouth every 6 hours as needed for pain maximum 4 tablet(s) per day, usp sparingly and no driving while on narcotic medication.       PRENATAL VIT-FE BISG-FA-DHA PO      Reported on 3/13/2017       sertraline 50 MG tablet    ZOLOFT    45 tablet    Take 1/2 tablet (25 mg) for 1-2 weeks, then increase to 1 tablet orally daily

## 2017-03-13 NOTE — PATIENT INSTRUCTIONS
You can reach your [place] Care Team any time of the day by calling [phone]. This number will put you in touch with the 24 hour nurse line if the clinic is closed.    To contact your OB/GYN Station Coordinator/Surgery Scheduler please call [phone]. This is a direct number for your care team between 8 a.m. and 4 p.m. Monday through Friday.    [facility] is open for your convenience:  Monday through Friday 8 a.m. to 6 p.m.  Closed weekends and all major holidays.

## 2017-03-13 NOTE — PROGRESS NOTES
HPI:  Carolyn Jacobs is a 26 year old female  s/p vaginal delivery 17 no contraception (breast feeding), who presents for follow up postpartum anxiety and depression. She notes that things are about the same, and she does not have a lot of help from her mom but her sister has been helpful when she is available. The father is gone for much of the day which makes things difficult as well. She notes that her son's behavioral issues have been difficult sometimes, and notes that he continues to have a hard time adjusting to his new sister. She feels that he is very sad or angry when she is more with her new daughter. Her son is currently in speech therapy and they also go to family therapy as well. She feels that her life is very chaotic, and feels that this will not improve for awhile because of her situation. She notes that she has an appointment with Elena from [facility] tomorrow to start with therapy. She notes that she does not sleep well, and she will wake up in a panic during the middle of the night because she feels that something horrible is happening to her children. She denies SI.    She has not increased to 50 mg of the Zoloft yet. She notes that she does occasionally take klonopin which can be helpful, and when she does take it, she will give her daughter more bottles rather than breast feeding. Issues with breast feeding with this medication discussed     PHQ-9 = 16  REILLY-7 = 21    Past Medical History   Diagnosis Date     Abnormal Pap smear      HPV & normal      Anemia      not on iron vits, O+ blood type     Anxiety      Varicosities      Past Surgical History   Procedure Laterality Date     C induced abortn by dil/evac  2005     [place] teeth       Family History   Problem Relation Age of Onset     Hypertension Mother      Neurologic Disorder Mother      migraine headache     Depression Sister      Depression Brother      Social History     Social History     Marital status: Single      Spouse name: N/A     Number of children: 0     Years of education: 11     Occupational History     student      Social History Main Topics     Smoking status: Former Smoker     Packs/day: 0.25     Years: 1.00     Types: Cigarettes     Quit date: 10/19/2014     Smokeless tobacco: Former User     Alcohol use No     Drug use: No     Sexual activity: Yes     Partners: Male     Birth control/ protection: None     Other Topics Concern     Not on file     Social History Narrative     Allergies:  Review of patient's allergies indicates no known allergies.    Current Outpatient Prescriptions   Medication Sig Dispense Refill     clonazePAM (KLONOPIN) 0.5 MG tablet Take 0.5 tablets (0.25 mg) by mouth 2 times daily as needed for anxiety 20 tablet 0     sertraline (ZOLOFT) 50 MG tablet Take 1/2 tablet (25 mg) for 1-2 weeks, then increase to 1 tablet orally daily 45 tablet 0     ibuprofen (ADVIL/MOTRIN) 800 MG tablet Take 1 tablet (800 mg) by mouth every 8 hours as needed for moderate pain (Patient not taking: Reported on 3/13/2017) 90 tablet 1     oxyCODONE (ROXICODONE) 5 MG IR tablet Take 1 tablet (5 mg) by mouth every 6 hours as needed for pain maximum 4 tablet(s) per day, usp sparingly and no driving while on narcotic medication. (Patient not taking: Reported on 3/13/2017) 18 tablet 0     ferrous sulfate (IRON) 325 (65 FE) MG tablet Take 1 tablet (325 mg) by mouth daily (with breakfast) (Patient not taking: Reported on 3/13/2017) 30 tablet 2     PRENATAL VIT-FE BISG-FA-DHA PO Reported on 3/13/2017         Review Of Systems  Genitourinary: negative  Psychiatric: positive for anxious and depressed mood NEGATIVE for SI    This document serves as a record of the services and decisions personally performed and made by Valdemar Martinez M.D. It was created on his behalf by January Deal, a trained medical scribe. The creation of this document is based the provider's statements to the medical scribe.  January Deal March 13, 2017 11:32  "AM     Exam:  /60  Temp 97.7  F (36.5  C) (Oral)  Ht 5' 4\" (1.626 m)  Wt 166 lb 6.4 oz (75.5 kg)  BMI 28.56 kg/m2  Constitutional: healthy, alert and no distress    Assessment/Plan:  (O99.345,  F41.1) Postpartum anxiety  Comment: We will increase her Zoloft to 50 mg, and I would consult w Dr Calabrese so see if a better pharmacological Rx is available in this situation. We discussed emergency help lines if needed  Plan: MENTAL HEALTH REFERRAL        Follow up in 2 weeks. I would like her to give me a call tomorrow about her appointment with Elena        The information in this document, created by the medical scribe for me, accurately reflects the services I personally performed and the decisions made by me. I have reviewed and approved this document for accuracy prior to leaving the patient care area.  3/13/2017 11:32 AM            Valdemar Martinez M.D.     Approximately 30 minutes were spent with the patient, of which > 50% was spent in face to face counselling.        "

## 2017-03-13 NOTE — Clinical Note
Dr Calabrese-- would you be so kind as to review this pt's chart and make any recommendations for pharmacologic Rx Thanks Valdemar Martinez M.D. [phone] pager [phone]  cell

## 2017-03-13 NOTE — NURSING NOTE
"Chief Complaint   Patient presents with     RECHECK     Postpartum depression--started 3/9/17--helps take the edge off--feels it isn't strong enough       Initial /60  Temp 97.7  F (36.5  C) (Oral)  Ht 5' 4\" (1.626 m)  Wt 166 lb 6.4 oz (75.5 kg)  BMI 28.56 kg/m2 Estimated body mass index is 28.56 kg/(m^2) as calculated from the following:    Height as of this encounter: 5' 4\" (1.626 m).    Weight as of this encounter: 166 lb 6.4 oz (75.5 kg).  Medication Reconciliation: complete   Romelia Chacon CMA      "

## 2017-03-14 ENCOUNTER — OFFICE VISIT (OUTPATIENT)
Dept: BEHAVIORAL HEALTH | Facility: CLINIC | Age: 27
End: 2017-03-14
Payer: COMMERCIAL

## 2017-03-14 DIAGNOSIS — F41.1 GAD (GENERALIZED ANXIETY DISORDER): Primary | ICD-10-CM

## 2017-03-14 DIAGNOSIS — F33.1 MODERATE EPISODE OF RECURRENT MAJOR DEPRESSIVE DISORDER (H): ICD-10-CM

## 2017-03-14 PROCEDURE — 90791 PSYCH DIAGNOSTIC EVALUATION: CPT | Performed by: MARRIAGE & FAMILY THERAPIST

## 2017-03-14 ASSESSMENT — ANXIETY QUESTIONNAIRES
IF YOU CHECKED OFF ANY PROBLEMS ON THIS QUESTIONNAIRE, HOW DIFFICULT HAVE THESE PROBLEMS MADE IT FOR YOU TO DO YOUR WORK, TAKE CARE OF THINGS AT HOME, OR GET ALONG WITH OTHER PEOPLE: VERY DIFFICULT
5. BEING SO RESTLESS THAT IT IS HARD TO SIT STILL: MORE THAN HALF THE DAYS
2. NOT BEING ABLE TO STOP OR CONTROL WORRYING: NEARLY EVERY DAY
GAD7 TOTAL SCORE: 21
3. WORRYING TOO MUCH ABOUT DIFFERENT THINGS: NEARLY EVERY DAY
6. BECOMING EASILY ANNOYED OR IRRITABLE: MORE THAN HALF THE DAYS
GAD7 TOTAL SCORE: 19
7. FEELING AFRAID AS IF SOMETHING AWFUL MIGHT HAPPEN: NEARLY EVERY DAY
1. FEELING NERVOUS, ANXIOUS, OR ON EDGE: NEARLY EVERY DAY

## 2017-03-14 ASSESSMENT — PATIENT HEALTH QUESTIONNAIRE - PHQ9
5. POOR APPETITE OR OVEREATING: NEARLY EVERY DAY
SUM OF ALL RESPONSES TO PHQ QUESTIONS 1-9: 16

## 2017-03-14 NOTE — MR AVS SNAPSHOT
After Visit Summary   3/14/2017    Carolyn Jacobs    MRN: [id number]           Patient Information     Date Of Birth          1990        Visit Information        Provider Department      3/14/2017 1:30 PM Magaly Redd LMFT [facility]        Today's Diagnoses     REILLY (generalized anxiety disorder)    -  1    Moderate episode of recurrent major depressive disorder (H)           Follow-ups after your visit        Who to contact     If you have questions or need follow up information about today's clinic visit or your schedule please contact [facility] directly at [phone].  Normal or non-critical lab and imaging results will be communicated to you by Alliquahart, letter or phone within 4 business days after the clinic has received the results. If you do not hear from us within 7 days, please contact the clinic through [employer] or phone. If you have a critical or abnormal lab result, we will notify you by phone as soon as possible.  Submit refill requests through Fantrotter or call your pharmacy and they will forward the refill request to us. Please allow 3 business days for your refill to be completed.          Additional Information About Your Visit        MyChart Information     Fantrotter gives you secure access to your electronic health record. If you see a primary care provider, you can also send messages to your care team and make appointments. If you have questions, please call your primary care clinic.  If you do not have a primary care provider, please call [phone] and they will assist you.        Care EveryWhere ID     This is your Care EveryWhere ID. This could be used by other organizations to access your [facility] medical records  [id number]         Blood Pressure from Last 3 Encounters:   03/13/17 104/60   03/07/17 100/60   02/26/17 119/68    Weight from Last 3 Encounters:   03/13/17 75.5 kg (166 lb 6.4 oz)   03/07/17 76.5 kg (168 lb 9.6 oz)    02/24/17 84 kg (185 lb 3.2 oz)              Today, you had the following     No orders found for display       Primary Care Provider Office Phone # Fax #    Tanisha Amaya [phone]       [facility] [address] [facility] [address]        Thank you!     [facility]. Our goal is always to provide you with excellent care. Hearing back from our patients is one way we can continue to improve our services. Please take a few minutes to complete the written survey that you may receive in the mail after your visit with us. Thank you!             Your Updated Medication List - Protect others around you: Learn how to safely use, store and throw away your medicines at www.disposemymeds.org.          This list is accurate as of: 3/14/17  2:18 PM.  Always use your most recent med list.                   Brand Name Dispense Instructions for use    clonazePAM 0.5 MG tablet    klonoPIN    20 tablet    Take 0.5 tablets (0.25 mg) by mouth 2 times daily as needed for anxiety       ferrous sulfate 325 (65 FE) MG tablet    IRON    30 tablet    Take 1 tablet (325 mg) by mouth daily (with breakfast)       ibuprofen 800 MG tablet    ADVIL/MOTRIN    90 tablet    Take 1 tablet (800 mg) by mouth every 8 hours as needed for moderate pain       oxyCODONE 5 MG IR tablet    ROXICODONE    18 tablet    Take 1 tablet (5 mg) by mouth every 6 hours as needed for pain maximum 4 tablet(s) per day, usp sparingly and no driving while on narcotic medication.       PRENATAL VIT-FE BISG-FA-DHA PO      Reported on 3/13/2017       sertraline 50 MG tablet    ZOLOFT    45 tablet    Take 1/2 tablet (25 mg) for 1-2 weeks, then increase to 1 tablet orally daily

## 2017-03-14 NOTE — PROGRESS NOTES
[facility]  Integrated Behavioral Health Services   Diagnostic Assessment      PATIENT'S NAME: Carolyn Jacobs  MRN:   [id number]  :   1990  DATE OF SERVICE: 2017  SERVICE LOCATION: Face to Face in Clinic  Visit Activities: NEW and [facility] Only      Identifying Information:  Patient is a 26 year old year old, , partnered / significant other female.  Patient attended the session with her daughter 2 weeks old.        Referral:  Patient was referred for an assessment by Dr. Martinez at [facility].   Reason for referral: clarify behavioral health diagnosis and determine behavioral health treatment options.       Patient's Statement of Presenting Concern:  Patient reports the following reason(s) for seeking an assessment at this time: depression and anxiety.  Patient stated that her symptoms have resulted in the following functional impairments: childcare / parenting, organization, relationship(s), self-care and social interactions      History of Presenting Concern:  Patient reports that these problem(s) began at the age of 15 started  Lexapro. Went off then in her 20's things got worse tried Celexa, Wellbutrin, Zoloft, Prozac, Vistaril. Last year was on Prozac and ativan. Patent states her anxiety has been the worse. She has had really had Post-partum depression . Patient has attempted to resolve these concerns in the past through: medication(s) from physician / PCP, physician / PCP and psychiatry. Patient reports that other professional(s) are involved in providing support / services. OB- Dr. Martinez. Would like to get started with therapy and set up with a new PCP. Patient states she is struggling with depression. Patient would also like to deal with her relationship with her mother and the chaos that is involved in the relationship.  She would like to learn better skills to deal with her anxiety.      Social History:  Patient reported  she grew up in [place], [place]. They were the first born of 4 children.  Patient reported that her childhood was chotic. Patient stated that she moved a lot. Patient does know her biological dad.  Step-father who pt considers her father. Came into her life when she was 4.  Patient reported a history of 4committed relationships . Patient has been partnered / significant other for 4 years. Patient reported having 2 children. Patient identified some stable and meaningful social connections.     Patient lives with mom, sister and boyfriend and two kids.  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  Work history n/a    Patient reported that she has not been involved with the legal system.  Patient's highest education level was high school graduate. Patient did not identify any learning problems. Patient did serve in the .  For 8 months in the [employer].       Mental Health History:  Patient reported the following biological family members or relatives with mental health issues: Mother experienced Bipolar Disorder and a Personality Disorder , Brother experienced Bipolar Disorder, Sister experienced Anxiety and Depression, other family memebers experienced Anxiety, Bipolar Disorder, Depression, Obsessive compulsive disorder, a Personality Disorder  and Schizophrenia. Patient has received the following mental health services in the past: physician / PCP and psychiatry. Patient is not currently receiving any mental health services.      Chemical Health History:  Patient reported the following biological family members or relatives with chemical health issues: Brother reportedly uses alcohol , Mother reportedly uses alcohol  and believes other drugs, but not sure, mom's boyfriend reportedly uses alcohol , cannabis  and heroin . Patient has not received chemical dependency treatment in the past. Patient is not currently receiving any chemical dependency treatment. Patient reports no problems as a result of their drinking / drug  use.  Have used in the past 20-22. Does not currently drink and smoke.     Cage-AID score is: 0 Based on Cage-Aid score and clinical interview there  are not indications of drug or alcohol abuse.      Discussed the general effects of drugs and alcohol on health and well-being.      Significant Losses / Trauma / Abuse / Neglect Issues:  There are no indications or report of: significant losses, trauma, abuse or neglect.    Issues of possible neglect are not present.      Medical History:     See patient medical record for problem list and current medications.      Medication Adherence:  Client reports taking prescribed medications as prescribed.    Patient was provided recommendation to follow-up with physician.    Mental Status Assessment:  Appearance:   Appropriate   Eye Contact:   Good   Psychomotor Behavior: Normal   Attitude:   Cooperative   Orientation:   All  Speech   Rate / Production: Normal    Volume:  Normal   Mood:    Anxious  Depressed  Sad   Affect:    Appropriate   Thought Content:  Clear   Thought Form:  Coherent  Logical   Insight:    Fair       Safety Issues and Plan for Safety and Risk Management:  Patient denies a history of suicidal ideation, suicide attempts, self-injurious behavior, homicidal ideation, homicidal behavior and and other safety concerns  Patient denies current fears or concerns for personal safety.  Patient denies current or recent suicidal ideation or behaviors.  Patient denies current or recent homicidal ideation or behaviors.  Patient denies current or recent self injurious behavior or ideation.  Patient denies other safety concerns.  Patient reports there are no firearms in the house  A safety and risk management plan has not been developed at this time, however client was given the after-hours number / 911 should there be a change in any of these risk factors.        Review of Symptoms:  Depression: Sleep Interest Guilt Energy Concentration Appetite Hopeless Helpless  Ruminations Irritability  Miguelina:  No symptoms  Psychosis: No symptoms  Anxiety: Worries Nervousness  Panic:  Palpitations Tremors Shortness of Breath Tingling Numbness Sense of Impending Doom Triggers: kids   Post Traumatic Stress Disorder: No symptoms  Obsessive Compulsive Disorder: No symptoms  Eating Disorder: No symptoms  Oppositional Defiant Disorder: No symptoms  ADD / ADHD: No symptoms  Conduct Disorder: No symptoms    Patient's Strengths and Limitations:  Client identified the following strengths or resources that will help her succeed in counseling: commitment to health and well being and family support. Client identified the following supports: family. Things that may interfere with the clients success in counseling include:financial hardship and transportation concerns.    Diagnostic Criteria:  A. Excessive anxiety and worry about a number of events or activities (such as work or school performance).   B. The person finds it difficult to control the worry.  C. Select 3 or more symptoms (required for diagnosis). Only one item is required in children.   - Restlessness or feeling keyed up or on edge.    - Being easily fatigued.    - Difficulty concentrating or mind going blank.    - Irritability.    - Muscle tension.    - Sleep disturbance (difficulty falling or staying asleep, or restless unsatisfying sleep).   D. The focus of the anxiety and worry is not confined to features of an Axis I disorder.  E. The anxiety, worry, or physical symptoms cause clinically significant distress or impairment in social, occupational, or other important areas of functioning.   F. The disturbance is not due to the direct physiological effects of a substance (e.g., a drug of abuse, a medication) or a general medical condition (e.g., hyperthyroidism) and does not occur exclusively during a Mood Disorder, a Psychotic Disorder, or a Pervasive Developmental Disorder.   - Depressed mood. Note: In children and adolescents, can be  irritable mood.     - Diminished interest or pleasure in all, or almost all, activities.    - Significant weight loss when not dieting decrease in appetite.    - Psychomotor activity agitation.    - Fatigue or loss of energy.    - Feelings of worthlessness or inappropriate and excessive guilt.    - Diminished ability to think or concentrate, or indecisiveness.   B) The symptoms cause clinically significant distress or impairment in social, occupational, or other important areas of functioning  C) The episode is not attributable to the physiological effects of a substance or to another medical condition  D) The occurence of major depressive episode is not better explained by other thought / psychotic disorders  E) There has never been a manic episode or hypomanic episode      Functional Status:  Client's symptoms are causing reduced functional status in the following areas: Activities of Daily Living - getting through the day  Social / Relational - lack of motivation      DSM5 Diagnoses: (Sustained by DSM5 Criteria Listed Above)  Diagnoses: 296.32 Major Depressive Disorder, Recurrent Episode, Moderate _ and With anxious distress  300.02 (F41.1) Generalized Anxiety Disorder  Psychosocial & Contextual Factors:  Money, family  WHODAS Score: 25  See Media section of EPIC medical record for completed WHODAS    Preliminary Treatment Plan:    Initial Treatment will focus on: Depressed Mood - decrease sadness  Anxiety - increase coping skills.    Chemical dependency recommendations: No indications of CD issues    As a preliminary treatment goal, patient will experience a reduction in depressed mood, will develop more effective coping skills to manage depressive symptoms, will develop healthy cognitive patterns and beliefs, will increase ability to function adaptively and will continue to take medications as prescribed / participate in supportive activities and services  and will experience a reduction in anxiety, will  develop more effective coping skills to manage anxiety symptoms, will develop healthy cognitive patterns and beliefs and will increase ability to function adaptively.    The focus of initial interventions will be to alleviate anxiety and alleviate depressed mood.    Collaboration with other professionals is not indicated at this time.    Referral to another professional/service is not indicated at this time..    A Release of Information is not needed at this time.    Report to child or adult protection services was NA.    SARAHI Martínez, Behavioral Health Clinician

## 2017-03-15 ENCOUNTER — TELEPHONE (OUTPATIENT)
Dept: INTERNAL MEDICINE | Facility: CLINIC | Age: 27
End: 2017-03-15

## 2017-03-15 DIAGNOSIS — F41.8 POSTPARTUM ANXIETY: Primary | ICD-10-CM

## 2017-03-15 ASSESSMENT — ANXIETY QUESTIONNAIRES: GAD7 TOTAL SCORE: 19

## 2017-03-15 ASSESSMENT — PATIENT HEALTH QUESTIONNAIRE - PHQ9: SUM OF ALL RESPONSES TO PHQ QUESTIONS 1-9: 17

## 2017-03-15 NOTE — TELEPHONE ENCOUNTER
Pt calling back.  Would like the referral for a psychiatrist.  Please call pt back Tel # [phone]

## 2017-03-15 NOTE — TELEPHONE ENCOUNTER
Explained to the pt that she would have to see a PCP here if she would like me to get her into [facility]. She can talk to dr. Martinez and see if he would put a mental health referral in for her. Pt will let us know what she finds out.

## 2017-03-15 NOTE — TELEPHONE ENCOUNTER
Pt is calling to see if Dr. Martinez will put in a referral to see [facility] for psychiatric care. Please advise

## 2017-03-15 NOTE — TELEPHONE ENCOUNTER
Reason for Call:  Other call back    Detailed comments: pt was referred to a psychiatrist originally by dr bailey, but forgot to talk about it with Magaly at their last OV. Pt would like that referral so she can arrange that     Phone Number Patient can be reached at: Cell number on file:    Telephone Information:   Mobile [phone]       Best Time:     Can we leave a detailed message on this number? YES    Call taken on 3/15/2017 at 1:21 PM by Shira Flynn

## 2017-03-16 NOTE — TELEPHONE ENCOUNTER
Pt calling on status of below.    Pended Mental Health Referral - Collaborative Care Psych Services that would been needed for pt to see [facility] Menard NP.    Routed to Dr. Langston (on-call) as Dr. Martinez is out of the office.

## 2017-03-22 ENCOUNTER — OFFICE VISIT (OUTPATIENT)
Dept: BEHAVIORAL HEALTH | Facility: CLINIC | Age: 27
End: 2017-03-22
Payer: COMMERCIAL

## 2017-03-22 DIAGNOSIS — F41.1 GAD (GENERALIZED ANXIETY DISORDER): Primary | ICD-10-CM

## 2017-03-22 DIAGNOSIS — F33.1 MODERATE EPISODE OF RECURRENT MAJOR DEPRESSIVE DISORDER (H): ICD-10-CM

## 2017-03-22 PROCEDURE — 90834 PSYTX W PT 45 MINUTES: CPT | Performed by: MARRIAGE & FAMILY THERAPIST

## 2017-03-22 NOTE — PROGRESS NOTES
[facility]  March 22, 2017      Behavioral Health Clinician Progress Note    Patient Name: Carolyn Jacobs           Service Type: Individual      Service Location:   Face to Face in Clinic     Session Start Time: 1:57  Session End Time: 1:45      Session Length: 38 - 52      Attendees: Client    Visit Activities (Refresh list every visit): [facility] Only    Diagnostic Assessment Date: 3/14/17  Treatment Plan Review Date: 3/22/17  See Flowsheets for today's PHQ-9 and REILLY-7 results  Previous PHQ-9:   PHQ-9 SCORE 3/7/2017 3/13/2017 3/14/2017   Total Score - - -   Total Score 15 16 17     Previous REILLY-7:   REILLY-7 SCORE 3/7/2017 3/13/2017 3/14/2017   Total Score - - -   Total Score 15 21 19       SARAI LEVEL:  No flowsheet data found.    DATA  Extended Session (60+ minutes): No  Interactive Complexity: No  Crisis: No    Treatment Objective(s) Addressed in This Session:  Target Behavior(s): diet/weight loss    Depressed Mood: Increase interest, engagement, and pleasure in doing things  Decrease frequency and intensity of feeling down, depressed, hopeless  Improve quantity and quality of night time sleep / decrease daytime naps  Feel less tired and more energy during the day   Improve diet, appetite, mindful eating, and / or meal planning  Identify negative self-talk and behaviors: challenge core beliefs, myths, and actions  Improve concentration, focus, and mindfulness in daily activities   Feel less fidgety, restless or slow in daily activities / interpersonal interactions  Anxiety: will experience a reduction in anxiety, will develop more effective coping skills to manage anxiety symptoms, will develop healthy cognitive patterns and beliefs and will increase ability to function adaptively    Current Stressors / Issues:  Processed things that contribute to pt's anxiety. Dicussed how our goals might change as we have children, but not our values. Discussed with pt what her values are in  Family,  parenting,marriage, work and personal care to help develop goals.        Progress on Treatment Objective(s) / Homework:  New Objective established this session - CONTEMPLATION (Considering change and yet undecided); Intervened by assessing the negative and positive thinking (ambivalence) about behavior change    Motivational Interviewing    MI Intervention: Co-Developed Goal: reduce her anxiety and Expressed Empathy/Understanding     Change Talk Expressed by the Patient: Desire to change    Provider Response to Change Talk: E - Evoked more info from patient about behavior change      Situation        Automatic Thoughts  Cognitive Distortions      Feelings        Behavior        Questioning Thoughts              Care Plan review completed: Yes    Medication Review:  No changes to current psychiatric medication(s)    Medication Compliance:  NA    Changes in Health Issues:   None reported    Chemical Use Review:   Substance Use: Chemical use reviewed, no active concerns identified      Tobacco Use: No current tobacco use.      Assessment: Current Emotional / Mental Status (status of significant symptoms):  Risk status (Self / Other harm or suicidal ideation)  Patient denies a history of suicidal ideation, suicide attempts, self-injurious behavior, homicidal ideation, homicidal behavior and and other safety concerns  Patient denies current fears or concerns for personal safety.  Patient denies current or recent suicidal ideation or behaviors.  Patient denies current or recent homicidal ideation or behaviors.  Patient denies current or recent self injurious behavior or ideation.  Patient denies other safety concerns.  A safety and risk management plan has not been developed at this time, however patient was encouraged to call [place] [phone] should there be a change in any of these risk factors.    Appearance:   Appropriate   Eye Contact:   Good   Psychomotor Behavior: Normal   Attitude:   Cooperative    Orientation:   All  Speech   Rate / Production: Normal    Volume:  Normal   Mood:    Normal  Affect:    Appropriate  Flat   Thought Content:  Clear   Thought Form:  Coherent  Logical   Insight:    Good     Diagnoses:  296.32 Major Depressive Disorder, Recurrent Episode, Moderate _ and With anxious distress  300.02 (F41.1) Generalized Anxiety Disorder  Collateral Reports Completed:  Not Applicable    Plan: (Homework, other):  Patient was given information about behavioral services and encouraged to schedule a follow up appointment with the clinic [facility] in 2 weeks.  She was also given information about mental health symptoms and treatment options  and Cognitive Behavioral Therapy skills to practice when experiencing anxiety and depression.  CD Recommendations: No indications of CD issues.  SARAHI Muir, [facility]      ______________________________________________________________________    Integrated Primary Care Behavioral Health Treatment Plan    Patient's Name: Carolyn Jacobs  YOB: 1990    Date: 3/22/17    DSM-V Diagnoses: 296.32 Major Depressive Disorder, Recurrent Episode, Moderate _ and With anxious distress or 300.02 (F41.1) Generalized Anxiety Disorder  Psychosocial / Contextual Factors: money, family  WHODAS: 25    Referral / Collaboration:  The following referral(s) will be initiated: will see [facility] on 4/6/16.    Anticipated number of session or this episode of care: 6-8      MeasurableTreatment Goal(s) related to diagnosis / functional impairment(s)  Goal 1: Patient will increase her coping skills to reduce her anxiety    I will know I've met my goal when less anxious.      Objective #A (Patient Action)    Patient will use cognitive strategies identified in therapy to challenge anxious thoughts.  Status: New - Date: 3/22/17     Intervention(s)  [facility] will assign homework look at personal values to help increase her postive goals for herself. .        Patient has reviewed and agreed to  the above plan.      Magaly Redd, LMFT  March 22, 2017

## 2017-03-22 NOTE — MR AVS SNAPSHOT
After Visit Summary   3/22/2017    Carolyn Jacobs    MRN: [id number]           Patient Information     Date Of Birth          1990        Visit Information        Provider Department      3/22/2017 2:00 PM [facility]        Today's Diagnoses     REILLY (generalized anxiety disorder)    -  1    Moderate episode of recurrent major depressive disorder (H)           Follow-ups after your visit        Your next 10 appointments already scheduled     Mar 27, 2017  2:45 PM CDT   [facility]    [address] [facility] [address]   [phone]            Apr 05, 2017  2:00 PM CDT   [facility]    [address] [facility] [address]  [facility] [address]   [phone]            Apr 12, 2017  8:15 AM CDT   [facility]    [address] [facility] [address]  [address]  [facility] [id number] [phone]              Who to contact     If you have questions or need follow up information about today's clinic visit or your schedule please contact [facility] directly at [phone].  Normal or non-critical lab and imaging results will be communicated to you by MyChart, letter or phone within 4 business days after the clinic has received the results. If you do not hear from us within 7 days, please contact the clinic through [employer] or phone. If you have a critical or abnormal lab result, we will notify you by phone as soon as possible.  Submit refill requests through [employer] or call your pharmacy and they will forward the refill request to us. Please allow 3 business days for your refill to be completed.          Additional Information About Your Visit        MyChart Information     [employer] gives you  secure access to your electronic health record. If you see a primary care provider, you can also send messages to your care team and make appointments. If you have questions, please call your primary care clinic.  If you do not have a primary care provider, please call [phone] and they will assist you.        Care EveryWhere ID     This is your Care EveryWhere ID. This could be used by other organizations to access your [place] medical records  [id number]         Blood Pressure from Last 3 Encounters:   03/13/17 104/60   03/07/17 100/60   02/26/17 119/68    Weight from Last 3 Encounters:   03/13/17 75.5 kg (166 lb 6.4 oz)   03/07/17 76.5 kg (168 lb 9.6 oz)   02/24/17 84 kg (185 lb 3.2 oz)              Today, you had the following     No orders found for display       Primary Care Provider Office Phone # Fax #    Tanisha Amaya [phone]       [facility] [address] [place] [address]        Thank you!     Thank you for choosing [facility]  for your care. Our goal is always to provide you with excellent care. Hearing back from our patients is one way we can continue to improve our services. Please take a few minutes to complete the written survey that you may receive in the mail after your visit with us. Thank you!             Your Updated Medication List - Protect others around you: Learn how to safely use, store and throw away your medicines at www.disposemymeds.org.          This list is accurate as of: 3/22/17  4:44 PM.  Always use your most recent med list.                   Brand Name Dispense Instructions for use    clonazePAM 0.5 MG tablet    klonoPIN    20 tablet    Take 0.5 tablets (0.25 mg) by mouth 2 times daily as needed for anxiety       ferrous sulfate 325 (65 FE) MG tablet    IRON    30 tablet    Take 1 tablet (325 mg) by mouth daily (with breakfast)       ibuprofen 800 MG tablet    ADVIL/MOTRIN    90 tablet    Take 1 tablet (800 mg) by  mouth every 8 hours as needed for moderate pain       oxyCODONE 5 MG IR tablet    ROXICODONE    18 tablet    Take 1 tablet (5 mg) by mouth every 6 hours as needed for pain maximum 4 tablet(s) per day, usp sparingly and no driving while on narcotic medication.       PRENATAL VIT-FE BISG-FA-DHA PO      Reported on 3/13/2017       sertraline 50 MG tablet    ZOLOFT    45 tablet    Take 1/2 tablet (25 mg) for 1-2 weeks, then increase to 1 tablet orally daily

## 2017-03-27 ENCOUNTER — OFFICE VISIT (OUTPATIENT)
Dept: OBGYN | Facility: CLINIC | Age: 27
End: 2017-03-27
Payer: COMMERCIAL

## 2017-03-27 VITALS
WEIGHT: 162.8 LBS | DIASTOLIC BLOOD PRESSURE: 70 MMHG | BODY MASS INDEX: 27.79 KG/M2 | SYSTOLIC BLOOD PRESSURE: 110 MMHG | HEIGHT: 64 IN

## 2017-03-27 DIAGNOSIS — J06.9 UPPER RESPIRATORY TRACT INFECTION, UNSPECIFIED TYPE: ICD-10-CM

## 2017-03-27 DIAGNOSIS — F41.8 POSTPARTUM ANXIETY: Primary | ICD-10-CM

## 2017-03-27 PROCEDURE — 99213 OFFICE O/P EST LOW 20 MIN: CPT | Mod: 24 | Performed by: OBSTETRICS & GYNECOLOGY

## 2017-03-27 RX ORDER — CLONAZEPAM 0.5 MG/1
0.25 TABLET ORAL 2 TIMES DAILY PRN
Qty: 20 TABLET | Refills: 0 | Status: SHIPPED | OUTPATIENT
Start: 2017-03-27 | End: 2017-04-06

## 2017-03-27 RX ORDER — SERTRALINE HYDROCHLORIDE 100 MG/1
100 TABLET, FILM COATED ORAL DAILY
Qty: 30 TABLET | Refills: 1 | Status: SHIPPED | OUTPATIENT
Start: 2017-03-27 | End: 2017-04-12

## 2017-03-27 ASSESSMENT — PATIENT HEALTH QUESTIONNAIRE - PHQ9: 5. POOR APPETITE OR OVEREATING: NEARLY EVERY DAY

## 2017-03-27 ASSESSMENT — ANXIETY QUESTIONNAIRES
IF YOU CHECKED OFF ANY PROBLEMS ON THIS QUESTIONNAIRE, HOW DIFFICULT HAVE THESE PROBLEMS MADE IT FOR YOU TO DO YOUR WORK, TAKE CARE OF THINGS AT HOME, OR GET ALONG WITH OTHER PEOPLE: VERY DIFFICULT
3. WORRYING TOO MUCH ABOUT DIFFERENT THINGS: NEARLY EVERY DAY
7. FEELING AFRAID AS IF SOMETHING AWFUL MIGHT HAPPEN: NEARLY EVERY DAY
6. BECOMING EASILY ANNOYED OR IRRITABLE: NEARLY EVERY DAY
5. BEING SO RESTLESS THAT IT IS HARD TO SIT STILL: SEVERAL DAYS
GAD7 TOTAL SCORE: 19
1. FEELING NERVOUS, ANXIOUS, OR ON EDGE: NEARLY EVERY DAY
2. NOT BEING ABLE TO STOP OR CONTROL WORRYING: NEARLY EVERY DAY

## 2017-03-27 NOTE — MR AVS SNAPSHOT
After Visit Summary   3/27/2017    Carolyn Jacobs    MRN: [id number]           Patient Information     Date Of Birth          1990        Visit Information        Provider Department      3/27/2017 2:45 PM Valdemar Martinez MD [facility]        Today's Diagnoses     Postpartum anxiety    -  1    Upper respiratory tract infection, unspecified type          Care Instructions    You can reach your [facility] Care Team any time of the day by calling [phone]. This number will put you in touch with the 24 hour nurse line if the clinic is closed.    To contact your OB/GYN Station Coordinator/Surgery Scheduler please call [phone]. This is a direct number for your care team between 8 a.m. and 4 p.m. Monday through Friday.    [facility] is open for your convenience:  Monday through Friday 8 a.m. to 6 p.m.  Closed weekends and all major holidays.          Follow-ups after your visit        Follow-up notes from your care team     Return in about 4 weeks (around 4/24/2017).      Your next 10 appointments already scheduled     Apr 05, 2017  2:00 PM CDT   Return Visit with SARAHI Martínez   [facility] ([facility]    [address]  [facility] [address] [phone]            Apr 12, 2017  8:15 AM CDT   New Visit with Carmenza Menard NP   [facility] ([facility])    [address]  [address]  [facility] [address] [phone]              Who to contact     If you have questions or need follow up information about today's clinic visit or your schedule please contact [facility] directly at [phone].  Normal or non-critical lab and imaging results will be communicated to you by MyChart, letter or phone within 4 business days after the clinic has received the results. If you do not hear from us within 7 days, please contact the clinic through gBoxhart  "or phone. If you have a critical or abnormal lab result, we will notify you by phone as soon as possible.  Submit refill requests through TalkPlus or call your pharmacy and they will forward the refill request to us. Please allow 3 business days for your refill to be completed.          Additional Information About Your Visit        Weembahart Information     TalkPlus gives you secure access to your electronic health record. If you see a primary care provider, you can also send messages to your care team and make appointments. If you have questions, please call your primary care clinic.  If you do not have a primary care provider, please call [phone] and they will assist you.        Care EveryWhere ID     This is your Care EveryWhere ID. This could be used by other organizations to access your [place] medical records  [id number]        Your Vitals Were     Height BMI (Body Mass Index)                5' 4\" (1.626 m) 27.94 kg/m2           Blood Pressure from Last 3 Encounters:   03/27/17 110/70   03/13/17 104/60   03/07/17 100/60    Weight from Last 3 Encounters:   03/27/17 162 lb 12.8 oz (73.8 kg)   03/13/17 166 lb 6.4 oz (75.5 kg)   03/07/17 168 lb 9.6 oz (76.5 kg)              Today, you had the following     No orders found for display         Today's Medication Changes          These changes are accurate as of: 3/27/17 11:59 PM.  If you have any questions, ask your nurse or doctor.               These medicines have changed or have updated prescriptions.        Dose/Directions    sertraline 100 MG tablet   Commonly known as:  ZOLOFT   This may have changed:    - medication strength  - how much to take  - how to take this  - when to take this  - additional instructions   Used for:  Postpartum anxiety   Changed by:  Valdemar Martinez MD        Dose:  100 mg   Take 1 tablet (100 mg) by mouth daily   Quantity:  30 tablet   Refills:  1            Where to get your medicines      These medications were sent to " [facility] [place], [place] - [address] [facility].  [address] [facility] [address]     Phone:  [phone]     sertraline 100 MG tablet         Some of these will need a paper prescription and others can be bought over the counter.  Ask your nurse if you have questions.     Bring a paper prescription for each of these medications     clonazePAM 0.5 MG tablet                Primary Care Provider    None Specified       No primary provider on file.        Thank you!     Thank you for choosing [facility]  for your care. Our goal is always to provide you with excellent care. Hearing back from our patients is one way we can continue to improve our services. Please take a few minutes to complete the written survey that you may receive in the mail after your visit with us. Thank you!             Your Updated Medication List - Protect others around you: Learn how to safely use, store and throw away your medicines at www.disposemymeds.org.          This list is accurate as of: 3/27/17 11:59 PM.  Always use your most recent med list.                   Brand Name Dispense Instructions for use    clonazePAM 0.5 MG tablet    klonoPIN    20 tablet    Take 0.5 tablets (0.25 mg) by mouth 2 times daily as needed for anxiety       ferrous sulfate 325 (65 FE) MG tablet    IRON    30 tablet    Take 1 tablet (325 mg) by mouth daily (with breakfast)       ibuprofen 800 MG tablet    ADVIL/MOTRIN    90 tablet    Take 1 tablet (800 mg) by mouth every 8 hours as needed for moderate pain       oxyCODONE 5 MG IR tablet    ROXICODONE    18 tablet    Take 1 tablet (5 mg) by mouth every 6 hours as needed for pain maximum 4 tablet(s) per day, usp sparingly and no driving while on narcotic medication.       PRENATAL VIT-FE BISG-FA-DHA PO      Reported on 3/27/2017       sertraline 100 MG tablet    ZOLOFT    30 tablet    Take 1 tablet (100 mg) by mouth daily

## 2017-03-27 NOTE — NURSING NOTE
"Chief Complaint   Patient presents with     RECHECK     f/u pp depression       Initial /70  Ht 5' 4\" (1.626 m)  Wt 162 lb 12.8 oz (73.8 kg)  BMI 27.94 kg/m2 Estimated body mass index is 27.94 kg/(m^2) as calculated from the following:    Height as of this encounter: 5' 4\" (1.626 m).    Weight as of this encounter: 162 lb 12.8 oz (73.8 kg).  Medication Reconciliation: complete   Romelia Chacon CMA      "

## 2017-03-27 NOTE — PROGRESS NOTES
HPI:  Carolyn Jacobs is a 26 year old female  s/p vaginal delivery 17 no contraception (breast feeding), who presents for follow up postpartum anxiety and depression. She continues to be on the 50 mg of the Zoloft. She does feel that things are over all better. She does note that she feels a little disconnected when she is taking the Zoloft but she notes that it does take the edge off. She does not sleep well at night, and she up every 1.5 hours with her daughter because of how often she is eating. She feels that she will not be able to sleep more until her daughter starts sleeping longer. She has been seen by Magaly Redd 2 times since her last visit, and she has a visit with Carmenza from Mental health  scheduled as well.   -She notes that she is worried to switch medications because she doesn't want to not have something to help her with her panic attacks. She does note that she does not breast feed after she takes the clonopin. We discussed the recs of Dr Calabrese regarding clonopin and the preferable alternatives  The pt does not want to take an alternative as the clonopin has worked well for her.  Risks, benefits, and alternative modes of therapy discussed at length. Pathophysiology of the disease process reviewed, all of the patients questions answered and informed consent obtained.  Breast feeding issues discussed with this med  -Her sister has been able to help when she is able. The father is taking care of her son while the patient takes care of her daughter.     PHQ-9 = see abstracted form  REILLY-7 = see abstracted form  Results were rev with pt and compared to previous values    Additionally, she notes that she has been having a stuffy nose, green mucus, and head feeling stuffy. She has been taking sudafed for the past week which has been helpful, and she has done saline rinses. She has been feeling ill for a few weeks now.     Past Medical History:   Diagnosis Date     Abnormal Pap smear      HPV & normal      Anemia     not on iron vits, O+ blood type     Anxiety      Varicosities      Past Surgical History:   Procedure Laterality Date     C INDUCED ABORTN BY DIL/EVAC  2005     wisdom teeth       Family History   Problem Relation Age of Onset     Hypertension Mother      Neurologic Disorder Mother      migraine headache     Depression Sister      Depression Brother      Social History     Social History     Marital status: Single     Spouse name: N/A     Number of children: 0     Years of education: 11     Occupational History     student      Social History Main Topics     Smoking status: Former Smoker     Packs/day: 0.25     Years: 1.00     Types: Cigarettes     Quit date: 10/19/2014     Smokeless tobacco: Former User     Alcohol use No     Drug use: No     Sexual activity: Yes     Partners: Male     Birth control/ protection: None     Other Topics Concern     Not on file     Social History Narrative     Allergies:  Review of patient's allergies indicates no known allergies.    Current Outpatient Prescriptions   Medication Sig Dispense Refill     clonazePAM (KLONOPIN) 0.5 MG tablet Take 0.5 tablets (0.25 mg) by mouth 2 times daily as needed for anxiety 20 tablet 0     sertraline (ZOLOFT) 50 MG tablet Take 1/2 tablet (25 mg) for 1-2 weeks, then increase to 1 tablet orally daily 45 tablet 0     ibuprofen (ADVIL/MOTRIN) 800 MG tablet Take 1 tablet (800 mg) by mouth every 8 hours as needed for moderate pain (Patient not taking: Reported on 3/13/2017) 90 tablet 1     oxyCODONE (ROXICODONE) 5 MG IR tablet Take 1 tablet (5 mg) by mouth every 6 hours as needed for pain maximum 4 tablet(s) per day, usp sparingly and no driving while on narcotic medication. (Patient not taking: Reported on 3/13/2017) 18 tablet 0     ferrous sulfate (IRON) 325 (65 FE) MG tablet Take 1 tablet (325 mg) by mouth daily (with breakfast) (Patient not taking: Reported on 3/13/2017) 30 tablet 2     PRENATAL VIT-FE BISG-FA-DHA PO  "Reported on 3/27/2017         Review Of Systems  Genitourinary: negative  Psychiatric: positive for anxious and depressed mood NEGATIVE for SI    This document serves as a record of the services and decisions personally performed and made by Valdemar Martinez M.D. It was created on his behalf by January Deal, a trained medical scribe. The creation of this document is based the provider's statements to the medical scribe.  January Deal March 27, 2017 2:49 PM      Exam:  /70  Ht 5' 4\" (1.626 m)  Wt 162 lb 12.8 oz (73.8 kg)  BMI 27.94 kg/m2  Constitutional: healthy, alert and no distress    Assessment/Plan:  (O99.345,  F41.1) Postpartum anxiety  (primary encounter diagnosis)  Comment: cares discussed  Will increase zoloft to 100 mg po qd as per rec of Dr Calabrese  Pt requests refill of clonazepam despite warnings and counseling to consider alternativess  Plan: sertraline (ZOLOFT) 100 MG tablet, clonazePAM         (KLONOPIN) 0.5 MG tablet        done    (J06.9) Upper respiratory tract infection, unspecified type  Comment: recommended using Allega-D  Plan: cares discussed pt to call if no improvement        The information in this document, created by the medical scribe for me, accurately reflects the services I personally performed and the decisions made by me. I have reviewed and approved this document for accuracy prior to leaving the patient care area.  3/27/2017 2:49 PM     Pt to follow with mental health for ongoing counseling and see me in 1 month or prn   Valdemar Martinez M.D.      Approximately 15  minutes were spent with the patient, of which > 50% was spent in face to face counselling.        "

## 2017-03-30 NOTE — PATIENT INSTRUCTIONS
You can reach your [facility] Care Team any time of the day by calling [phone]. This number will put you in touch with the 24 hour nurse line if the clinic is closed.    To contact your OB/GYN Station Coordinator/Surgery Scheduler please call [phone]. This is a direct number for your care team between 8 a.m. and 4 p.m. Monday through Friday.    [facility] is open for your convenience:  Monday through Friday 8 a.m. to 6 p.m.  Closed weekends and all major holidays.

## 2017-04-03 ENCOUNTER — TELEPHONE (OUTPATIENT)
Dept: BEHAVIORAL HEALTH | Facility: CLINIC | Age: 27
End: 2017-04-03

## 2017-04-03 NOTE — TELEPHONE ENCOUNTER
Reason for Call:  Other call back    Detailed comments: Pt calling wanting Treasure to call her back and wants to discuss with her how to find a primary     Phone Number Patient can be reached at: Cell number on file:    Telephone Information:   Mobile [phone]       Best Time: anytime    Can we leave a detailed message on this number? YES    Call taken on 4/3/2017 at 12:28 PM by MARCOS HAYES

## 2017-04-03 NOTE — TELEPHONE ENCOUNTER
Wanted to know who was the primary here. Gave her names of PCP in our clinic. Pt will call back to get scheduled.

## 2017-04-04 ASSESSMENT — ANXIETY QUESTIONNAIRES: GAD7 TOTAL SCORE: 19

## 2017-04-04 ASSESSMENT — PATIENT HEALTH QUESTIONNAIRE - PHQ9: SUM OF ALL RESPONSES TO PHQ QUESTIONS 1-9: 17

## 2017-04-05 ENCOUNTER — OFFICE VISIT (OUTPATIENT)
Dept: BEHAVIORAL HEALTH | Facility: CLINIC | Age: 27
End: 2017-04-05
Payer: COMMERCIAL

## 2017-04-05 DIAGNOSIS — F41.1 GAD (GENERALIZED ANXIETY DISORDER): Primary | ICD-10-CM

## 2017-04-05 DIAGNOSIS — F33.1 MODERATE EPISODE OF RECURRENT MAJOR DEPRESSIVE DISORDER (H): ICD-10-CM

## 2017-04-05 PROCEDURE — 90837 PSYTX W PT 60 MINUTES: CPT | Performed by: MARRIAGE & FAMILY THERAPIST

## 2017-04-05 NOTE — PROGRESS NOTES
[facility]  April 5, 2017      Behavioral Health Clinician Progress Note    Patient Name: Carolyn Jacobs           Service Type: Individual      Service Location:   Face to Face in Clinic     Session Start Time: 2:00  Session End Time: 2:45      Session Length: 38 - 52      Attendees: Client    Visit Activities (Refresh list every visit): [facility] Only    Diagnostic Assessment Date: 3/14/17  Treatment Plan Review Date: 3/22/17  See Flowsheets for today's PHQ-9 and REILLY-7 results  Previous PHQ-9:   PHQ-9 SCORE 3/13/2017 3/14/2017 3/27/2017   Total Score - - -   Total Score 16 17 17     Previous REILLY-7:   REILLY-7 SCORE 3/13/2017 3/14/2017 3/27/2017   Total Score - - -   Total Score 21 19 19       SARAI LEVEL:  No flowsheet data found.    DATA  Extended Session (60+ minutes): No  Interactive Complexity: No  Crisis: No    Treatment Objective(s) Addressed in This Session:  Target Behavior(s): diet/weight loss    Depressed Mood: Increase interest, engagement, and pleasure in doing things  Decrease frequency and intensity of feeling down, depressed, hopeless  Improve quantity and quality of night time sleep / decrease daytime naps  Feel less tired and more energy during the day   Improve diet, appetite, mindful eating, and / or meal planning  Identify negative self-talk and behaviors: challenge core beliefs, myths, and actions  Improve concentration, focus, and mindfulness in daily activities   Feel less fidgety, restless or slow in daily activities / interpersonal interactions  Anxiety: will experience a reduction in anxiety, will develop more effective coping skills to manage anxiety symptoms, will develop healthy cognitive patterns and beliefs and will increase ability to function adaptively    Current Stressors / Issues:  Discussed with pt her anxiety and worked on   COMMON COGNITIVE ERRORS     We all have patterns of thinking, and this may impact our emotional state and behavior. Sometimes our  patterns are less than accurate. These are cognitive errors or cognitive distortions, and they typically fall into certain categories. Learning to recognize our own cognitive errors increases our ability to ignore the negative thought or actively change it, which enables us to intentionally change our emotions and our behaviors. The following is a list of the most common cognitive distortions:     1. All-or-Nothing Thinking Putting experiences in one of two categories Examples: 1) People are all good or all bad. 2) Projects are perfect or failures. 3) I am a sinner, or I am a saint.   2. Overgeneralizing Believing that something will always happen because it happened once Examples: 1) I will never be able to make friends at a party because I once made an awkward statement to someone, and they didn t want to be my friend. 2) I will never be able to speak in public because I once had a panic attack before giving a speech.   3. Discounting the Positive Deciding that if a good thing happens, it must not be important or doesn t count Examples: 1) I passed the exam this time, but it was a fluke. 2) I didn t have a panic attack today, but it s only because I was too busy to be worried.   4. Jumping to Conclusions Deciding how to respond to a situation without having all the information Examples: 1) The man/woman I am interested in never called me back because he thinks I m stupid. 2) That person cut me off in traffic because he/she is a jerk!   5. Mind Reading Believing that you know how someone else is feeling or what they are thinking without any evidence Examples: 1) I know she hates my guts. 2) That person thinks I m a loser.   6. Fortunetelling Believing that you can predict a future outcome, while ignoring other alternatives Examples: 1) I m going to fail this test. 2) I m going to have a panic attack if I go out in public.   7. Magnifying (Catastrophizing) or Minimizing Distorting the importance of positive and  negative events Examples: 1) I said the wrong thing so I will never have a boyfriend/girlfriend. 2) My nose is so big that no one will ever love me. 3) It doesn t matter if I m smart because I will never be attractive, athletic, popular, rich, etc. 4) Making a mountain out of a molehill.    8. Emotional Reasoning Believing something to be true because it feels true. Examples: 1) I am a failure because I feel like a failure. 2) I am worthless because I feel worthless.   9.  Should-y  Thinking Telling yourself you should, should not, or should have done something when it is more accurate to say that you would have preferred or wished you had or had not done something Examples: 1) I should be perfect. 2) I should never make mistakes. 3) I should not be anxious. 4) I should have done something to help.   10. Labeling (or Mis-Labeling) Using a label to describe a behavior or error Examples: 1) He s a bad person (instead of  He made a mistake when he lied. ) 2) I m stupid (instead of  I didn t study for my test, and I failed it. )   11. Personalization Taking blame for some negative event even though you were not responsible, you could not have known to do differently, there were extenuating circumstances, or other people were involved. Examples: 1) It s my fault he hits me. 2) My mother is unhappy because of me.           Progress on Treatment Objective(s) / Homework:  New Objective established this session - CONTEMPLATION (Considering change and yet undecided); Intervened by assessing the negative and positive thinking (ambivalence) about behavior change    Motivational Interviewing    MI Intervention: Co-Developed Goal: reduce her anxiety and Expressed Empathy/Understanding     Change Talk Expressed by the Patient: Desire to change    Provider Response to Change Talk: E - Evoked more info from patient about behavior change      Situation        Automatic Thoughts  Cognitive Distortions      Feelings        Behavior         Questioning Thoughts              Care Plan review completed: Yes    Medication Review:  No changes to current psychiatric medication(s)    Medication Compliance:  NA    Changes in Health Issues:   None reported    Chemical Use Review:   Substance Use: Chemical use reviewed, no active concerns identified      Tobacco Use: No current tobacco use.      Assessment: Current Emotional / Mental Status (status of significant symptoms):  Risk status (Self / Other harm or suicidal ideation)  Patient denies a history of suicidal ideation, suicide attempts, self-injurious behavior, homicidal ideation, homicidal behavior and and other safety concerns  Patient denies current fears or concerns for personal safety.  Patient denies current or recent suicidal ideation or behaviors.  Patient denies current or recent homicidal ideation or behaviors.  Patient denies current or recent self injurious behavior or ideation.  Patient denies other safety concerns.  A safety and risk management plan has not been developed at this time, however patient was encouraged to call [place] [phone] should there be a change in any of these risk factors.    Appearance:   Appropriate   Eye Contact:   Good   Psychomotor Behavior: Normal   Attitude:   Cooperative   Orientation:   All  Speech   Rate / Production: Normal    Volume:  Normal   Mood:    Normal  Affect:    Appropriate  Flat   Thought Content:  Clear   Thought Form:  Coherent  Logical   Insight:    Good     Diagnoses:  296.32 Major Depressive Disorder, Recurrent Episode, Moderate _ and With anxious distress  300.02 (F41.1) Generalized Anxiety Disorder  Collateral Reports Completed:  Not Applicable    Plan: (Homework, other):  Patient was given information about behavioral services and encouraged to schedule a follow up appointment with the clinic [facility] in 2 weeks.  She was also given information about mental health symptoms and treatment options  and Cognitive Behavioral Therapy skills  to practice when experiencing anxiety and depression.  CD Recommendations: No indications of CD issues.  SARAHI Muir, [facility]      ______________________________________________________________________    Integrated Primary Care Behavioral Health Treatment Plan    Patient's Name: Carolyn Jacobs  YOB: 1990    Date: 3/22/17    DSM-V Diagnoses: 296.32 Major Depressive Disorder, Recurrent Episode, Moderate _ and With anxious distress or 300.02 (F41.1) Generalized Anxiety Disorder  Psychosocial / Contextual Factors: money, family  WHODAS: 25    Referral / Collaboration:  The following referral(s) will be initiated: will see Carmenza on 4/6/16.    Anticipated number of session or this episode of care: 6-8      MeasurableTreatment Goal(s) related to diagnosis / functional impairment(s)  Goal 1: Patient will increase her coping skills to reduce her anxiety    I will know I've met my goal when less anxious.      Objective #A (Patient Action)    Patient will use cognitive strategies identified in therapy to challenge anxious thoughts.  Status: New - Date: 3/22/17     Intervention(s)  [facility] will assign homework look at personal values to help increase her postive goals for herself. .        Patient has reviewed and agreed to the above plan.      SARAHI Martínez  March 22, 2017

## 2017-04-05 NOTE — MR AVS SNAPSHOT
After Visit Summary   4/5/2017    Carolyn Jacobs    MRN: [id number]           Patient Information     Date Of Birth          1990        Visit Information        Provider Department      4/5/2017 2:00 PM Magaly Redd LMFT [facility]        Today's Diagnoses     REILLY (generalized anxiety disorder)    -  1    Moderate episode of recurrent major depressive disorder (H)           Follow-ups after your visit        Your next 10 appointments already scheduled     Apr 06, 2017  1:20 PM CDT   SHORT with JOSE Bonilla CNP   [facility]    [address] [place] [address]   [phone]            Apr 12, 2017  8:15 AM CDT   New Visit with Carmenza Menard NP   [facility]    [address]  [address]  [facility] [address]   [phone]            Apr 19, 2017  2:00 PM CDT   Return Visit with SARAHI Martínez   [facility]    [address]  [facility] [address]   [phone]            Apr 27, 2017  1:45 PM CDT   SHORT with Valdemar Martinez MD   [facility]    [address] [id number] [phone]              Who to contact     If you have questions or need follow up information about today's clinic visit or your schedule please contact [facility] directly at [phone].  Normal or non-critical lab and imaging results will be communicated to you by MyChart, letter or phone within 4 business days after the clinic has received the results. If you do not hear from us within 7 days, please contact the clinic through MyChart or phone. If you have a critical or abnormal lab result, we will notify you by phone as soon as possible.  Submit refill requests through [employer] or call your  pharmacy and they will forward the refill request to us. Please allow 3 business days for your refill to be completed.          Additional Information About Your Visit        [employer] Information     [employer] gives you secure access to your electronic health record. If you see a primary care provider, you can also send messages to your care team and make appointments. If you have questions, please call your primary care clinic.  If you do not have a primary care provider, please call [phone] and they will assist you.        Care EveryWhere ID     This is your Care EveryWhere ID. This could be used by other organizations to access your [place] medical records  [id number]         Blood Pressure from Last 3 Encounters:   03/27/17 110/70   03/13/17 104/60   03/07/17 100/60    Weight from Last 3 Encounters:   03/27/17 73.8 kg (162 lb 12.8 oz)   03/13/17 75.5 kg (166 lb 6.4 oz)   03/07/17 76.5 kg (168 lb 9.6 oz)              Today, you had the following     No orders found for display       Primary Care Provider    None Specified       No primary provider on file.        Thank you!     Thank you for choosing [facility]  for your care. Our goal is always to provide you with excellent care. Hearing back from our patients is one way we can continue to improve our services. Please take a few minutes to complete the written survey that you may receive in the mail after your visit with us. Thank you!             Your Updated Medication List - Protect others around you: Learn how to safely use, store and throw away your medicines at www.disposemymeds.org.          This list is accurate as of: 4/5/17  4:48 PM.  Always use your most recent med list.                   Brand Name Dispense Instructions for use    clonazePAM 0.5 MG tablet    klonoPIN    20 tablet    Take 0.5 tablets (0.25 mg) by mouth 2 times daily as needed for anxiety       ferrous sulfate 325 (65 FE) MG tablet    IRON    30 tablet    Take 1  tablet (325 mg) by mouth daily (with breakfast)       ibuprofen 800 MG tablet    ADVIL/MOTRIN    90 tablet    Take 1 tablet (800 mg) by mouth every 8 hours as needed for moderate pain       oxyCODONE 5 MG IR tablet    ROXICODONE    18 tablet    Take 1 tablet (5 mg) by mouth every 6 hours as needed for pain maximum 4 tablet(s) per day, usp sparingly and no driving while on narcotic medication.       PRENATAL VIT-FE BISG-FA-DHA PO      Reported on 3/27/2017       sertraline 100 MG tablet    ZOLOFT    30 tablet    Take 1 tablet (100 mg) by mouth daily

## 2017-04-06 ENCOUNTER — OFFICE VISIT (OUTPATIENT)
Dept: INTERNAL MEDICINE | Facility: CLINIC | Age: 27
End: 2017-04-06
Payer: COMMERCIAL

## 2017-04-06 VITALS
HEIGHT: 64 IN | TEMPERATURE: 97.8 F | DIASTOLIC BLOOD PRESSURE: 70 MMHG | OXYGEN SATURATION: 98 % | HEART RATE: 94 BPM | BODY MASS INDEX: 27.49 KG/M2 | SYSTOLIC BLOOD PRESSURE: 112 MMHG | WEIGHT: 161 LBS

## 2017-04-06 DIAGNOSIS — F41.8 POSTPARTUM ANXIETY: Primary | ICD-10-CM

## 2017-04-06 DIAGNOSIS — F33.1 MODERATE EPISODE OF RECURRENT MAJOR DEPRESSIVE DISORDER (H): ICD-10-CM

## 2017-04-06 DIAGNOSIS — Z30.8 ENCOUNTER FOR OTHER CONTRACEPTIVE MANAGEMENT: ICD-10-CM

## 2017-04-06 PROCEDURE — 99214 OFFICE O/P EST MOD 30 MIN: CPT | Performed by: NURSE PRACTITIONER

## 2017-04-06 RX ORDER — CLONAZEPAM 0.5 MG/1
0.5 TABLET ORAL 2 TIMES DAILY PRN
Qty: 60 TABLET | Refills: 0 | Status: SHIPPED | OUTPATIENT
Start: 2017-04-06 | End: 2017-04-25

## 2017-04-06 NOTE — PROGRESS NOTES
".  SUBJECTIVE:                                                    Carolyn Jacobs is a 26 year old female who presents to clinic today for the following health issues:  Establish care and review postpartum anxiety and depression meds and discuss birth control options    1) Birth control: Used IUD in PP period after 2.5-yr old son was born, was very uncomfortable and had it removed. Has also used Depo shot, but would like something more long-term now as does not want more kids. Partner, Roddy does not want to get a vasectomy. Thinking she wants Nexplanon.    2) Anxiety: Has been on many SSRIs in past and she usually weans off herself. Feels her stable status is important for her kids so going to \"give these meds a real try.\" Currently on zoloft - up to 100 mg and not as sad on this. Also taking clonazepam, but feels she needs a higher dose \"It is almost helping.\" Having about 1 panic attack every day, usually come on at night and involve SOB and irrational thoughts. Feels out of body at times with panic attacks. Taking 1/2 tablet in am and one at night - has taken an extra dose for panic attacks as needed throughout the day. Estimates taking about 2 full tablets in 24 hr period.     Working with Magaly therapist     Sees Carmenza next week for psych      Kids are grounding for her, 2 1/2 year old son with autism takes a lot of her time to care for, currently in several enrichment activities and is nonverbal. Also has new 5 week old daughter. Did not have PPA/D with 1st pregnancy.    Problem list and histories reviewed & adjusted, as indicated.  Additional history: as documented    Reviewed and updated as needed this visit by clinical staff  Tobacco  Allergies  Meds  Med Hx  Surg Hx  Fam Hx  Soc Hx      Reviewed and updated as needed this visit by Provider       ROS:  Constitutional, HEENT, cardiovascular, pulmonary, gi and gu systems are negative, except as otherwise noted.    OBJECTIVE:                        " "                            /70 (BP Location: Left arm, Patient Position: Chair, Cuff Size: Adult Regular)  Pulse 94  Temp 97.8  F (36.6  C) (Oral)  Ht 5' 4\" (1.626 m)  Wt 161 lb (73 kg)  SpO2 98%  Breastfeeding? No  BMI 27.64 kg/m2  Body mass index is 27.64 kg/(m^2).  GENERAL: healthy, alert and mild distress  PSYCH: anxious and judgement and insight intact    Diagnostic Test Results:  none      ASSESSMENT/PLAN:                                                      1. Postpartum anxiety  Encouraged pt that she is taking first step in asking for help and opening up about how she is feeling. Suggested joining a postpartum anxiety and depression support group to be able to spend more time with women who have experienced similar challenges. Pt not ready right now to do that but will keep in mind.    Discussed that Carmenza Menard may want to adjust her dosage so will wait on signing a controlled substance agreement to see if this will be a longterm med for her. Pt understands.  - clonazePAM (KLONOPIN) 0.5 MG tablet; Take 1 tablet (0.5 mg) by mouth 2 times daily as needed for anxiety  Dispense: 60 tablet; Refill: 0    2. Encounter for other contraceptive management  Discussed birth control options, especially LARCs. Pt does not desire IUD, would like Nexplanon. Advised to see her OB for insertion.    Patient Instructions   May increase clonazepam to a whole pill twice daily     Carmenza Menard NP - psych provider as planned     Magaly - continue therapy as planned    Emi SOLOMON, cheryl serving as a scribe; to document services personally performed by  JOSE Osborn CNP based on data collection and the provider's statements to me.     Emi Carlson, MPH RN [place]    I was present for entire exam,  and reviewed history. Additions have been made for clarification,  and I agree with above note   Arleen Napier CNP      Appointment of 40 minutes was greater than 50% counseling and coordination of " care for anxiety/ depression      JOSE Bonilla [facility]

## 2017-04-06 NOTE — MR AVS SNAPSHOT
After Visit Summary   4/6/2017    Carolyn Jacobs    MRN: [id number]           Patient Information     Date Of Birth          1990        Visit Information        Provider Department      4/6/2017 1:20 PM Arleen Napier APRN CNP [facility]        Today's Diagnoses     Postpartum anxiety          Care Instructions    May increase clonazepam to a whole pill twice daily     Carmenza Menard NP - psych provider as planned     Magaly - continue therapy as planned        Follow-ups after your visit        Your next 10 appointments already scheduled     Apr 12, 2017  8:15 AM CDT   New Visit with Carmenza Menard NP   [facility]    [address]  [address]  [facility] [id number] [phone]            Apr 19, 2017  2:00 PM CDT   Return Visit with SARAHI Martínez   [facility]    [address]  [facility] [id number] [phone]            Apr 27, 2017  1:45 PM CDT   SHORT with Valdemar Martinez MD   [facility]    [address] [address] [phone]              Who to contact     If you have questions or need follow up information about today's clinic visit or your schedule please contact [facility] directly at [phone].  Normal or non-critical lab and imaging results will be communicated to you by MyChart, letter or phone within 4 business days after the clinic has received the results. If you do not hear from us within 7 days, please contact the clinic through MyChart or phone. If you have a critical or abnormal lab result, we will notify you by phone as soon as possible.  Submit refill requests through [employer] or call your pharmacy and they will forward the refill request to us. Please allow 3 business days for your refill to be completed.  "         Additional Information About Your Visit        [employer] Information     [employer] gives you secure access to your electronic health record. If you see a primary care provider, you can also send messages to your care team and make appointments. If you have questions, please call your primary care clinic.  If you do not have a primary care provider, please call [phone] and they will assist you.        Care EveryWhere ID     This is your Care EveryWhere ID. This could be used by other organizations to access your [place] medical records  [id number]        Your Vitals Were     Pulse Temperature Height Pulse Oximetry Breastfeeding? BMI (Body Mass Index)    94 97.8  F (36.6  C) (Oral) 5' 4\" (1.626 m) 98% No 27.64 kg/m2       Blood Pressure from Last 3 Encounters:   04/06/17 112/70   03/27/17 110/70   03/13/17 104/60    Weight from Last 3 Encounters:   04/06/17 161 lb (73 kg)   03/27/17 162 lb 12.8 oz (73.8 kg)   03/13/17 166 lb 6.4 oz (75.5 kg)              Today, you had the following     No orders found for display         Today's Medication Changes          These changes are accurate as of: 4/6/17  2:05 PM.  If you have any questions, ask your nurse or doctor.               These medicines have changed or have updated prescriptions.        Dose/Directions    clonazePAM 0.5 MG tablet   Commonly known as:  klonoPIN   This may have changed:  how much to take   Used for:  Postpartum anxiety   Changed by:  Arleen Napier APRN CNP        Dose:  0.5 mg   Take 1 tablet (0.5 mg) by mouth 2 times daily as needed for anxiety   Quantity:  60 tablet   Refills:  0            Where to get your medicines      Some of these will need a paper prescription and others can be bought over the counter.  Ask your nurse if you have questions.     Bring a paper prescription for each of these medications     clonazePAM 0.5 MG tablet                Primary Care Provider    None Specified       No primary provider on file.      "   Thank you!     Thank you for choosing [facility]  for your care. Our goal is always to provide you with excellent care. Hearing back from our patients is one way we can continue to improve our services. Please take a few minutes to complete the written survey that you may receive in the mail after your visit with us. Thank you!             Your Updated Medication List - Protect others around you: Learn how to safely use, store and throw away your medicines at www.disposemymeds.org.          This list is accurate as of: 4/6/17  2:05 PM.  Always use your most recent med list.                   Brand Name Dispense Instructions for use    clonazePAM 0.5 MG tablet    klonoPIN    60 tablet    Take 1 tablet (0.5 mg) by mouth 2 times daily as needed for anxiety       ibuprofen 800 MG tablet    ADVIL/MOTRIN    90 tablet    Take 1 tablet (800 mg) by mouth every 8 hours as needed for moderate pain       sertraline 100 MG tablet    ZOLOFT    30 tablet    Take 1 tablet (100 mg) by mouth daily

## 2017-04-06 NOTE — LETTER
My Depression Action Plan  Name: Carolyn Jacobs   Date of Birth 1990  Date: 4/6/2017    My doctor: No primary care provider on file.   My clinic: [place] [address]  [facility] [address] [phone]          GREEN    ZONE   Good Control    What it looks like:     Things are going generally well. You have normal up s and down s. You may even feel depressed from time to time, but bad moods usually last less than a day.   What you need to do:  1. Continue to care for yourself (see self care plan)  2. Check your depression survival kit and update it as needed  3. Follow your physician s recommendations including any medication.  4. Do not stop taking medication unless you consult with your physician first.           YELLOW         ZONE Getting Worse    What it looks like:     Depression is starting to interfere with your life.     It may be hard to get out of bed; you may be starting to isolate yourself from others.    Symptoms of depression are starting to last most all day and this has happened for several days.     You may have suicidal thoughts but they are not constant.   What you need to do:     1. Call your care team, your response to treatment will improve if you keep your care team informed of your progress. Yellow periods are signs an adjustment may need to be made.     2. Continue your self-care, even if you have to fake it!    3. Talk to someone in your support network    4. Open up your depression survival kit           RED    ZONE Medical Alert - Get Help    What it looks like:     Depression is seriously interfering with your life.     You may experience these or other symptoms: You can t get out of bed most days, can t work or engage in other necessary activities, you have trouble taking care of basic hygiene, or basic responsibilities, thoughts of suicide or death that will not go away, self-injurious behavior.     What you need to  do:  1. Call your care team and request a same-day appointment. If they are not available (weekends or after hours) call your local crisis line, emergency room or 911.      Electronically signed by: Arleen Napier, April 6, 2017    Depression Self Care Plan / Survival Kit    Self-Care for Depression  Here s the deal. Your body and mind are really not as separate as most people think.  What you do and think affects how you feel and how you feel influences what you do and think. This means if you do things that people who feel good do, it will help you feel better.  Sometimes this is all it takes.  There is also a place for medication and therapy depending on how severe your depression is, so be sure to consult with your medical provider and/ or Behavioral Health Consultant if your symptoms are worsening or not improving.     In order to better manage my stress, I will:    Exercise  Get some form of exercise, every day. This will help reduce pain and release endorphins, the  feel good  chemicals in your brain. This is almost as good as taking antidepressants!  This is not the same as joining a gym and then never going! (they count on that by the way ) It can be as simple as just going for a walk or doing some gardening, anything that will get you moving.      Hygiene   Maintain good hygiene (Get out of bed in the morning, Make your bed, Brush your teeth, Take a shower, and Get dressed like you were going to work, even if you are unemployed).  If your clothes don't fit try to get ones that do.    Diet  I will strive to eat foods that are good for me, drink plenty of water, and avoid excessive sugar, caffeine, alcohol, and other mood-altering substances.  Some foods that are helpful in depression are: complex carbohydrates, B vitamins, flaxseed, fish or fish oil, fresh fruits and vegetables.    Psychotherapy  I agree to participate in Individual Therapy (if recommended).    Medication  If prescribed medications, I  agree to take them.  Missing doses can result in serious side effects.  I understand that drinking alcohol, or other illicit drug use, may cause potential side effects.  I will not stop my medication abruptly without first discussing it with my provider.    Staying Connected With Others  I will stay in touch with my friends, family members, and my primary care provider/team.    Use your imagination  Be creative.  We all have a creative side; it doesn t matter if it s oil painting, sand castles, or mud pies! This will also kick up the endorphins.    Witness Beauty  (AKA stop and smell the roses) Take a look outside, even in mid-winter. Notice colors, textures. Watch the squirrels and birds.     Service to others  Be of service to others.  There is always someone else in need.  By helping others we can  get out of ourselves  and remember the really important things.  This also provides opportunities for practicing all the other parts of the program.    Humor  Laugh and be silly!  Adjust your TV habits for less news and crime-drama and more comedy.    Control your stress  Try breathing deep, massage therapy, biofeedback, and meditation. Find time to relax each day.     My support system    Clinic Contact:  Phone number:    Contact 1:  Phone number:    Contact 2:  Phone number:    Worship/:  Phone number:    Therapist:  Phone number:    Local crisis center:    Phone number:    Other community support:  Phone number:

## 2017-04-06 NOTE — PATIENT INSTRUCTIONS
May increase clonazepam to a whole pill twice daily     Carmenza Menard NP - psych provider as planned     Magaly - continue therapy as planned

## 2017-04-06 NOTE — NURSING NOTE
"Chief Complaint   Patient presents with     Establish Care     discuss options of BCP and also discuss her other meds       Initial /70 (BP Location: Left arm, Patient Position: Chair, Cuff Size: Adult Regular)  Pulse 94  Temp 97.8  F (36.6  C) (Oral)  Ht 5' 4\" (1.626 m)  Wt 161 lb (73 kg)  SpO2 98%  BMI 27.64 kg/m2 Estimated body mass index is 27.64 kg/(m^2) as calculated from the following:    Height as of this encounter: 5' 4\" (1.626 m).    Weight as of this encounter: 161 lb (73 kg).  Medication Reconciliation: complete    "

## 2017-04-12 ENCOUNTER — OFFICE VISIT (OUTPATIENT)
Dept: PSYCHIATRY | Facility: CLINIC | Age: 27
End: 2017-04-12
Attending: OBSTETRICS & GYNECOLOGY
Payer: COMMERCIAL

## 2017-04-12 ENCOUNTER — TELEPHONE (OUTPATIENT)
Dept: PSYCHIATRY | Facility: CLINIC | Age: 27
End: 2017-04-12

## 2017-04-12 VITALS
WEIGHT: 160 LBS | DIASTOLIC BLOOD PRESSURE: 60 MMHG | OXYGEN SATURATION: 100 % | HEART RATE: 85 BPM | HEIGHT: 64 IN | SYSTOLIC BLOOD PRESSURE: 100 MMHG | BODY MASS INDEX: 27.31 KG/M2

## 2017-04-12 DIAGNOSIS — F41.8 POSTPARTUM ANXIETY: ICD-10-CM

## 2017-04-12 PROCEDURE — 90791 PSYCH DIAGNOSTIC EVALUATION: CPT | Performed by: NURSE PRACTITIONER

## 2017-04-12 RX ORDER — ARIPIPRAZOLE 2 MG/1
2 TABLET ORAL AT BEDTIME
Qty: 30 TABLET | Refills: 1 | Status: SHIPPED | OUTPATIENT
Start: 2017-04-12 | End: 2017-04-25

## 2017-04-12 RX ORDER — SERTRALINE HYDROCHLORIDE 100 MG/1
150 TABLET, FILM COATED ORAL DAILY
Qty: 45 TABLET | Refills: 1 | Status: SHIPPED | OUTPATIENT
Start: 2017-04-12 | End: 2017-05-10

## 2017-04-12 ASSESSMENT — ANXIETY QUESTIONNAIRES
7. FEELING AFRAID AS IF SOMETHING AWFUL MIGHT HAPPEN: NEARLY EVERY DAY
6. BECOMING EASILY ANNOYED OR IRRITABLE: NEARLY EVERY DAY
5. BEING SO RESTLESS THAT IT IS HARD TO SIT STILL: NEARLY EVERY DAY
2. NOT BEING ABLE TO STOP OR CONTROL WORRYING: NEARLY EVERY DAY
3. WORRYING TOO MUCH ABOUT DIFFERENT THINGS: NEARLY EVERY DAY
IF YOU CHECKED OFF ANY PROBLEMS ON THIS QUESTIONNAIRE, HOW DIFFICULT HAVE THESE PROBLEMS MADE IT FOR YOU TO DO YOUR WORK, TAKE CARE OF THINGS AT HOME, OR GET ALONG WITH OTHER PEOPLE: VERY DIFFICULT
1. FEELING NERVOUS, ANXIOUS, OR ON EDGE: NEARLY EVERY DAY
GAD7 TOTAL SCORE: 21

## 2017-04-12 ASSESSMENT — PATIENT HEALTH QUESTIONNAIRE - PHQ9: 5. POOR APPETITE OR OVEREATING: NEARLY EVERY DAY

## 2017-04-12 NOTE — Clinical Note
Juan Daniel Bacon and Magaly Redd, [facility], [facility] Thank you for the Psychiatry referral to the [facility] ([facility]). Our psychiatry providers act as a specialty service for Primary Care Providers in the [facility] that seek to optimize medications for unstable patients.  Once medications have been optimized, our providers discharge the patient back to the referring Primary Care Provider for ongoing medication management.  This type of system allows our providers to serve a high volume of patients.   Please see my Impression and Plan.  If you have any questions or concerns, please let me know.  Carmenza

## 2017-04-12 NOTE — TELEPHONE ENCOUNTER
Reason for call: Other   Patient called regarding (reason for call): Info to pass along to provider  Additional comments: Carolyn forgot to mention to Carmenza that she feels she has compulsion issues.  No need to call her back unless Carmenza has concerns.  This info was just for Carmenza's notes.    Phone Number Pt can be reached at: Cell number on file:    Telephone Information:   Mobile [phone]     Best Time: n/a  Can we leave a detailed message on this number? Not Applicable

## 2017-04-12 NOTE — NURSING NOTE
"Chief Complaint   Patient presents with     Consult     anxiety ongoing worse after having children,panic attacks and postpartum.       Initial /60 (BP Location: Right arm, Patient Position: Chair, Cuff Size: Adult Regular)  Pulse 85  Ht 5' 4\" (1.626 m)  Wt 160 lb (72.6 kg)  SpO2 100%  BMI 27.46 kg/m2 Estimated body mass index is 27.46 kg/(m^2) as calculated from the following:    Height as of this encounter: 5' 4\" (1.626 m).    Weight as of this encounter: 160 lb (72.6 kg).  Medication Reconciliation: complete    "

## 2017-04-12 NOTE — MR AVS SNAPSHOT
After Visit Summary   4/12/2017    Carolyn Jacobs    MRN: [id number]           Patient Information     Date Of Birth          1990        Visit Information        Provider Department      4/12/2017 8:15 AM Carmenza Menard NP [facility]        Today's Diagnoses     Postpartum anxiety          Care Instructions    Treatment Plan:    Continue Zoloft (sertraline) 100 mg by mouth daily for depression and anxiety.    Start Abilify (aripriprazole) 2 mg by mouth daily.     Continue Klonopin (clonazepam) 0.5 mg by mouth up to 2 times per day for anxiety per primary care provider.     Continue all other medications as reviewed per electronic medical record today.     All questions addressed. Education and counseling completed regarding risks and benefits of medications and psychotherapy options.    Safety plan was reviewed. To the Emergency Department as needed or call after hours crisis line at [phone] or [phone].     [facility]     Schedule an appointment with me in 4 weeks or sooner as needed.  Call [facility] at [phone] to schedule.    Follow up with primary care provider as planned or for acute medical concerns.    Call the psychiatric nurse line with medication questions or concerns at [phone].    My Practice Policy was reviewed and signed: YES     MyChart may be used to communicate with your provider, but this is not intended to be used for emergencies.        Follow-ups after your visit        Follow-up notes from your care team     Return in about 4 weeks (around 5/10/2017).      Your next 10 appointments already scheduled     Apr 19, 2017  2:00 PM CDT   [facility]   [facility]    [address] [facility] [address]  [facility] [address]   [phone]            Apr 27, 2017  1:45 PM CDT  "  SHORT with Valdemar Martinez MD   [facility]    [address] [address] [phone]              Who to contact     If you have questions or need follow up information about today's clinic visit or your schedule please contact [facility] directly at [phone].  Normal or non-critical lab and imaging results will be communicated to you by [employer]art, letter or phone within 4 business days after the clinic has received the results. If you do not hear from us within 7 days, please contact the clinic through beSUCC[employer]art or phone. If you have a critical or abnormal lab result, we will notify you by phone as soon as possible.  Submit refill requests through [employer] or call your pharmacy and they will forward the refill request to us. Please allow 3 business days for your refill to be completed.          Additional Information About Your Visit        [employer] Information     [employer] gives you secure access to your electronic health record. If you see a primary care provider, you can also send messages to your care team and make appointments. If you have questions, please call your primary care clinic.  If you do not have a primary care provider, please call [phone] and they will assist you.        Care EveryWhere ID     This is your Care EveryWhere ID. This could be used by other organizations to access your [facility] medical records  [id number]        Your Vitals Were     Pulse Height Pulse Oximetry BMI (Body Mass Index)          85 5' 4\" (1.626 m) 100% 27.46 kg/m2         Blood Pressure from Last 3 Encounters:   04/12/17 100/60   04/06/17 112/70   03/27/17 110/70    Weight from Last 3 Encounters:   04/12/17 160 lb (72.6 kg)   04/06/17 161 lb (73 kg)   03/27/17 162 lb 12.8 oz (73.8 kg)              Today, you had the following     No orders found for display         Today's Medication Changes          These changes " are accurate as of: 4/12/17  9:35 AM.  If you have any questions, ask your nurse or doctor.               Start taking these medicines.        Dose/Directions    ARIPiprazole 2 MG tablet   Commonly known as:  ABILIFY   Used for:  Postpartum anxiety   Started by:  Carmenza Menard NP        Dose:  2 mg   Take 1 tablet (2 mg) by mouth At Bedtime   Quantity:  30 tablet   Refills:  1         These medicines have changed or have updated prescriptions.        Dose/Directions    sertraline 100 MG tablet   Commonly known as:  ZOLOFT   This may have changed:    - how much to take  - additional instructions   Used for:  Postpartum anxiety   Changed by:  Carmenza Menard NP        Dose:  150 mg   Take 1.5 tablets (150 mg) by mouth daily Increased dose.   Quantity:  45 tablet   Refills:  1            Where to get your medicines      These medications were sent to [facility] [place] [address]  [facility] [address] [facility] [address]     Phone:  [phone]     ARIPiprazole 2 MG tablet    sertraline 100 MG tablet                Primary Care Provider Office Phone # Fax #    JOSE Bonilla [facility] [phone]       [facility] [address] [place] [address]        Thank you!     Thank you for choosing [facility]  for your care. Our goal is always to provide you with excellent care. Hearing back from our patients is one way we can continue to improve our services. Please take a few minutes to complete the written survey that you may receive in the mail after your visit with us. Thank you!             Your Updated Medication List - Protect others around you: Learn how to safely use, store and throw away your medicines at www.disposemymeds.org.          This list is accurate as of: 4/12/17  9:35 AM.  Always use your most recent med list.                   Brand Name Dispense Instructions for use    ARIPiprazole 2 MG tablet    ABILIFY     30 tablet    Take 1 tablet (2 mg) by mouth At Bedtime       clonazePAM 0.5 MG tablet    klonoPIN    60 tablet    Take 1 tablet (0.5 mg) by mouth 2 times daily as needed for anxiety       ibuprofen 800 MG tablet    ADVIL/MOTRIN    90 tablet    Take 1 tablet (800 mg) by mouth every 8 hours as needed for moderate pain       sertraline 100 MG tablet    ZOLOFT    45 tablet    Take 1.5 tablets (150 mg) by mouth daily Increased dose.

## 2017-04-12 NOTE — Clinical Note
Juan Daniel Martinez Thank you for the Psychiatry referral to the [facility] ([facility]). Our psychiatry providers act as a specialty service for Primary Care Providers in the [facility] that seek to optimize medications for unstable patients.  Once medications have been optimized, our providers discharge the patient back to the referring Primary Care Provider for ongoing medication management.  This type of system allows our providers to serve a high volume of patients.   Please see my Impression and Plan.  If you have any questions or concerns, please let me know.  Carmenza

## 2017-04-12 NOTE — TELEPHONE ENCOUNTER
Noted. Thanks for the update.   Our medications that we chose today will also likely help with those symptoms.

## 2017-04-12 NOTE — PATIENT INSTRUCTIONS
Treatment Plan:    Continue Zoloft (sertraline) 100 mg by mouth daily for depression and anxiety.    Start Abilify (aripriprazole) 2 mg by mouth daily.     Continue Klonopin (clonazepam) 0.5 mg by mouth up to 2 times per day for anxiety per primary care provider.     Continue all other medications as reviewed per electronic medical record today.     All questions addressed. Education and counseling completed regarding risks and benefits of medications and psychotherapy options.    Safety plan was reviewed. To the Emergency Department as needed or call after hours crisis line at [phone] or [phone].     Continue individual/group therapy as planned with SARAHI [facility]     Schedule an appointment with me in 4 weeks or sooner as needed.  Call [facility] at [phone] to schedule.    Follow up with primary care provider as planned or for acute medical concerns.    Call the psychiatric nurse line with medication questions or concerns at [phone].    My Practice Policy was reviewed and signed: YES     MyChart may be used to communicate with your provider, but this is not intended to be used for emergencies.
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
## 2017-04-13 ENCOUNTER — TELEPHONE (OUTPATIENT)
Dept: PSYCHIATRY | Facility: CLINIC | Age: 27
End: 2017-04-13

## 2017-04-13 ASSESSMENT — PATIENT HEALTH QUESTIONNAIRE - PHQ9: SUM OF ALL RESPONSES TO PHQ QUESTIONS 1-9: 18

## 2017-04-13 ASSESSMENT — ANXIETY QUESTIONNAIRES: GAD7 TOTAL SCORE: 21

## 2017-04-13 NOTE — TELEPHONE ENCOUNTER
Spoke with Carolyn Jacobs and explained that most often pt has increased appetite. Provided education on healthy choices as well as exercise, and ensuring proper hydration. Pt verbalized understanding. Pt also reported she will contact us if her weight is going up, and if she notices a huge appetite shift.

## 2017-04-13 NOTE — TELEPHONE ENCOUNTER
Boy. I would include that information that you listed below. We will monitor her weight at our visits and keep in mind health promotion activities such as exercise and healthy food choices.

## 2017-04-13 NOTE — TELEPHONE ENCOUNTER
Reason for call: Other   Patient called regarding (reason for call): prescription  Additional comments: Has question on abilify and wondering if she could get some answers about weight gain, is it the pill itself or does it increase appetite? She doesn't want to be on anything that will have her gain weight as she just had a baby.     Phone Number Pt can be reached at: Home number on file [phone] (home)  Best Time: ASAP  Can we leave a detailed message on this number? YES

## 2017-04-19 ENCOUNTER — OFFICE VISIT (OUTPATIENT)
Dept: BEHAVIORAL HEALTH | Facility: CLINIC | Age: 27
End: 2017-04-19
Payer: COMMERCIAL

## 2017-04-19 DIAGNOSIS — F33.1 MODERATE EPISODE OF RECURRENT MAJOR DEPRESSIVE DISORDER (H): ICD-10-CM

## 2017-04-19 DIAGNOSIS — F41.1 GAD (GENERALIZED ANXIETY DISORDER): Primary | ICD-10-CM

## 2017-04-19 PROCEDURE — 90837 PSYTX W PT 60 MINUTES: CPT | Performed by: MARRIAGE & FAMILY THERAPIST

## 2017-04-19 ASSESSMENT — ANXIETY QUESTIONNAIRES
5. BEING SO RESTLESS THAT IT IS HARD TO SIT STILL: SEVERAL DAYS
7. FEELING AFRAID AS IF SOMETHING AWFUL MIGHT HAPPEN: MORE THAN HALF THE DAYS
2. NOT BEING ABLE TO STOP OR CONTROL WORRYING: SEVERAL DAYS
3. WORRYING TOO MUCH ABOUT DIFFERENT THINGS: MORE THAN HALF THE DAYS
IF YOU CHECKED OFF ANY PROBLEMS ON THIS QUESTIONNAIRE, HOW DIFFICULT HAVE THESE PROBLEMS MADE IT FOR YOU TO DO YOUR WORK, TAKE CARE OF THINGS AT HOME, OR GET ALONG WITH OTHER PEOPLE: SOMEWHAT DIFFICULT
6. BECOMING EASILY ANNOYED OR IRRITABLE: SEVERAL DAYS
GAD7 TOTAL SCORE: 10
1. FEELING NERVOUS, ANXIOUS, OR ON EDGE: MORE THAN HALF THE DAYS

## 2017-04-19 ASSESSMENT — PATIENT HEALTH QUESTIONNAIRE - PHQ9: 5. POOR APPETITE OR OVEREATING: SEVERAL DAYS

## 2017-04-19 NOTE — MR AVS SNAPSHOT
After Visit Summary   4/19/2017    Carolyn Jacosb    MRN: [id number]           Patient Information     Date Of Birth          1990        Visit Information        Provider Department      4/19/2017 2:00 PM Magaly Redd LMFT [facility]        Today's Diagnoses     REILLY (generalized anxiety disorder)    -  1    Moderate episode of recurrent major depressive disorder (H)           Follow-ups after your visit        Your next 10 appointments already scheduled     Apr 27, 2017  1:45 PM CDT   [facility] with Valdemar Martinez MD   [facility]    [address] [address]   [phone]            May 02, 2017  1:30 PM CDT   Return Visit with SARAHI Martínez   [facility] ([facility])    [address]  [facility] [id number] [phone]            May 10, 2017 10:15 AM CDT   Return Visit with Carmenza Menard NP   [facility] ([facility])    [address]  [address]  [facility] [id number] [phone]              Who to contact     If you have questions or need follow up information about today's clinic visit or your schedule please contact [facility] directly at [phone].  Normal or non-critical lab and imaging results will be communicated to you by MyChart, letter or phone within 4 business days after the clinic has received the results. If you do not hear from us within 7 days, please contact the clinic through MyChart or phone. If you have a critical or abnormal lab result, we will notify you by phone as soon as possible.  Submit refill requests through [email or web address] or call your pharmacy and they will forward the refill request to us. Please allow 3 business days for your refill to be completed.          Additional Information About Your Visit        MyChart Information      [employer] gives you secure access to your electronic health record. If you see a primary care provider, you can also send messages to your care team and make appointments. If you have questions, please call your primary care clinic.  If you do not have a primary care provider, please call [phone] and they will assist you.        Care EveryWhere ID     This is your Care EveryWhere ID. This could be used by other organizations to access your [place] medical records  [id number]         Blood Pressure from Last 3 Encounters:   04/25/17 102/60   04/12/17 100/60   04/06/17 112/70    Weight from Last 3 Encounters:   04/25/17 69.9 kg (154 lb)   04/12/17 72.6 kg (160 lb)   04/06/17 73 kg (161 lb)              Today, you had the following     No orders found for display       Primary Care Provider Office Phone # Fax #    JOSE Bonilla [facility] [phone]       [facility] [address] [place] [address]        Thank you!     Thank you for choosing [facility]  for your care. Our goal is always to provide you with excellent care. Hearing back from our patients is one way we can continue to improve our services. Please take a few minutes to complete the written survey that you may receive in the mail after your visit with us. Thank you!             Your Updated Medication List - Protect others around you: Learn how to safely use, store and throw away your medicines at www.disposemymeds.org.          This list is accurate as of: 4/19/17 11:59 PM.  Always use your most recent med list.                   Brand Name Dispense Instructions for use    ibuprofen 800 MG tablet    ADVIL/MOTRIN    90 tablet    Take 1 tablet (800 mg) by mouth every 8 hours as needed for moderate pain       sertraline 100 MG tablet    ZOLOFT    45 tablet    Take 1.5 tablets (150 mg) by mouth daily Increased dose.

## 2017-04-19 NOTE — PROGRESS NOTES
[facility]  April 19 2017      Behavioral Health Clinician Progress Note    Patient Name: Carolyn Jacobs           Service Type: Individual      Service Location:   Face to Face in Clinic     Session Start Time: 1:55  Session End Time: 3:00      Session Length: 53 - 60      Attendees: Client    Visit Activities (Refresh list every visit): [facility] Only    Diagnostic Assessment Date: 3/14/17  Treatment Plan Review Date: 3/22/17  See Flowsheets for today's PHQ-9 and REILLY-7 results  Previous PHQ-9:   PHQ-9 SCORE 3/14/2017 3/27/2017 4/12/2017   Total Score - - -   Total Score 17 17 18     Previous REILLY-7:   REILLY-7 SCORE 3/14/2017 3/27/2017 4/12/2017   Total Score - - -   Total Score 19 19 21       SARAI LEVEL:  No flowsheet data found.    DATA  Extended Session (60+ minutes): No  Interactive Complexity: No  Crisis: No    Treatment Objective(s) Addressed in This Session:  Target Behavior(s): diet/weight loss    Depressed Mood: Increase interest, engagement, and pleasure in doing things  Decrease frequency and intensity of feeling down, depressed, hopeless  Improve quantity and quality of night time sleep / decrease daytime naps  Feel less tired and more energy during the day   Improve diet, appetite, mindful eating, and / or meal planning  Identify negative self-talk and behaviors: challenge core beliefs, myths, and actions  Improve concentration, focus, and mindfulness in daily activities   Feel less fidgety, restless or slow in daily activities / interpersonal interactions  Anxiety: will experience a reduction in anxiety, will develop more effective coping skills to manage anxiety symptoms, will develop healthy cognitive patterns and beliefs and will increase ability to function adaptively    Current Stressors / Issues:  Discussed with pt her anxiety she has had increased anxiety and irritability. Started her on mood charting due to her feeling like she has bipolar. She states that she has  increased ups and downs, struggles with sleeping, spending. She feels like she has been on a alexis trend. Her anxiety has increased and she is always worried about the kids. Discussed using headspace and chart to help monitor.         Progress on Treatment Objective(s) / Homework:  New Objective established this session - CONTEMPLATION (Considering change and yet undecided); Intervened by assessing the negative and positive thinking (ambivalence) about behavior change    Motivational Interviewing    MI Intervention: Co-Developed Goal: reduce her anxiety and Expressed Empathy/Understanding     Change Talk Expressed by the Patient: Desire to change    Provider Response to Change Talk: E - Evoked more info from patient about behavior change      Situation        Automatic Thoughts  Cognitive Distortions      Feelings        Behavior        Questioning Thoughts              Care Plan review completed: Yes    Medication Review:  No changes to current psychiatric medication(s)    Medication Compliance:  NA    Changes in Health Issues:   None reported    Chemical Use Review:   Substance Use: Chemical use reviewed, no active concerns identified      Tobacco Use: No current tobacco use.      Assessment: Current Emotional / Mental Status (status of significant symptoms):  Risk status (Self / Other harm or suicidal ideation)  Patient denies a history of suicidal ideation, suicide attempts, self-injurious behavior, homicidal ideation, homicidal behavior and and other safety concerns  Patient denies current fears or concerns for personal safety.  Patient denies current or recent suicidal ideation or behaviors.  Patient denies current or recent homicidal ideation or behaviors.  Patient denies current or recent self injurious behavior or ideation.  Patient denies other safety concerns.  A safety and risk management plan has not been developed at this time, however patient was encouraged to call [facility] should there be a  change in any of these risk factors.    Appearance:   Appropriate   Eye Contact:   Good   Psychomotor Behavior: Normal   Attitude:   Cooperative   Orientation:   All  Speech   Rate / Production: Normal    Volume:  Normal   Mood:    Normal  Affect:    Appropriate  Flat   Thought Content:  Clear   Thought Form:  Coherent  Logical   Insight:    Good     Diagnoses:  296.32 Major Depressive Disorder, Recurrent Episode, Moderate _ and With anxious distress  300.02 (F41.1) Generalized Anxiety Disorder  Collateral Reports Completed:  Not Applicable    Plan: (Homework, other):  Patient was given information about behavioral services and encouraged to schedule a follow up appointment with the clinic [facility] in 2 weeks.  She was also given information about mental health symptoms and treatment options  and Cognitive Behavioral Therapy skills to practice when experiencing anxiety and depression.  CD Recommendations: No indications of CD issues.  SARAHI Muir, [facility]      ______________________________________________________________________    Integrated Primary Care Behavioral Health Treatment Plan    Patient's Name: Carolyn Jacobs  YOB: 1990    Date: 3/22/17    DSM-V Diagnoses: 296.32 Major Depressive Disorder, Recurrent Episode, Moderate _ and With anxious distress or 300.02 (F41.1) Generalized Anxiety Disorder  Psychosocial / Contextual Factors: money, family  WHODAS: 25    Referral / Collaboration:  The following referral(s) will be initiated: will see Carmenza on 4/6/16.    Anticipated number of session or this episode of care: 6-8      MeasurableTreatment Goal(s) related to diagnosis / functional impairment(s)  Goal 1: Patient will increase her coping skills to reduce her anxiety    I will know I've met my goal when less anxious.      Objective #A (Patient Action)    Patient will use cognitive strategies identified in therapy to challenge anxious thoughts.  Status: New - Date: 3/22/17      Intervention(s)  [facility] will assign homework look at personal values to help increase her postive goals for herself. .        Patient has reviewed and agreed to the above plan.      Magaly Redd, LMFT  March 22, 2017

## 2017-04-21 ASSESSMENT — ANXIETY QUESTIONNAIRES: GAD7 TOTAL SCORE: 10

## 2017-04-21 ASSESSMENT — PATIENT HEALTH QUESTIONNAIRE - PHQ9: SUM OF ALL RESPONSES TO PHQ QUESTIONS 1-9: 9

## 2017-04-24 ENCOUNTER — TELEPHONE (OUTPATIENT)
Dept: PSYCHIATRY | Facility: CLINIC | Age: 27
End: 2017-04-24

## 2017-04-24 NOTE — TELEPHONE ENCOUNTER
I spoke to Carolyn Jacobs. She is very relieved to have the appointment tomorrow. She said she is able to wait till then to have her needs address.

## 2017-04-24 NOTE — TELEPHONE ENCOUNTER
Reason for call: Other   Patient called regarding (reason for call): prescription  Additional comments: client is looking for an increase in her klonopin because her panic attacks are getting worse. Other medications are working for client. Has an appt 5/10 and on the wait list for something sooner.     Phone Number Pt can be reached at: Home number on file [phone] (home)  Best Time: anytime  Can we leave a detailed message on this number? YES

## 2017-04-25 ENCOUNTER — OFFICE VISIT (OUTPATIENT)
Dept: PSYCHIATRY | Facility: CLINIC | Age: 27
End: 2017-04-25
Payer: COMMERCIAL

## 2017-04-25 VITALS
WEIGHT: 154 LBS | HEART RATE: 94 BPM | TEMPERATURE: 97.7 F | SYSTOLIC BLOOD PRESSURE: 102 MMHG | OXYGEN SATURATION: 100 % | BODY MASS INDEX: 26.43 KG/M2 | DIASTOLIC BLOOD PRESSURE: 60 MMHG

## 2017-04-25 DIAGNOSIS — F41.8 POSTPARTUM ANXIETY: ICD-10-CM

## 2017-04-25 PROCEDURE — 99214 OFFICE O/P EST MOD 30 MIN: CPT | Performed by: NURSE PRACTITIONER

## 2017-04-25 RX ORDER — CLONAZEPAM 0.5 MG/1
0.5 TABLET ORAL 3 TIMES DAILY PRN
Qty: 90 TABLET | Refills: 1 | Status: SHIPPED | OUTPATIENT
Start: 2017-04-25 | End: 2017-05-10

## 2017-04-25 RX ORDER — ARIPIPRAZOLE 5 MG/1
5 TABLET ORAL AT BEDTIME
Qty: 30 TABLET | Refills: 1 | Status: SHIPPED | OUTPATIENT
Start: 2017-04-25 | End: 2017-06-15

## 2017-04-25 ASSESSMENT — PATIENT HEALTH QUESTIONNAIRE - PHQ9: 5. POOR APPETITE OR OVEREATING: NEARLY EVERY DAY

## 2017-04-25 ASSESSMENT — ANXIETY QUESTIONNAIRES
3. WORRYING TOO MUCH ABOUT DIFFERENT THINGS: NEARLY EVERY DAY
6. BECOMING EASILY ANNOYED OR IRRITABLE: NEARLY EVERY DAY
5. BEING SO RESTLESS THAT IT IS HARD TO SIT STILL: NEARLY EVERY DAY
GAD7 TOTAL SCORE: 21
2. NOT BEING ABLE TO STOP OR CONTROL WORRYING: NEARLY EVERY DAY
IF YOU CHECKED OFF ANY PROBLEMS ON THIS QUESTIONNAIRE, HOW DIFFICULT HAVE THESE PROBLEMS MADE IT FOR YOU TO DO YOUR WORK, TAKE CARE OF THINGS AT HOME, OR GET ALONG WITH OTHER PEOPLE: VERY DIFFICULT
1. FEELING NERVOUS, ANXIOUS, OR ON EDGE: NEARLY EVERY DAY
7. FEELING AFRAID AS IF SOMETHING AWFUL MIGHT HAPPEN: NEARLY EVERY DAY

## 2017-04-25 NOTE — NURSING NOTE
"Chief Complaint   Patient presents with     RECHECK     pt feels abilify could be a little higher, panic and axiety attacks still a problem.       Initial /60 (BP Location: Right arm, Patient Position: Chair, Cuff Size: Adult Regular)  Pulse 94  Temp 97.7  F (36.5  C) (Oral)  Wt 154 lb (69.9 kg)  SpO2 100%  BMI 26.43 kg/m2 Estimated body mass index is 26.43 kg/(m^2) as calculated from the following:    Height as of 4/12/17: 5' 4\" (1.626 m).    Weight as of this encounter: 154 lb (69.9 kg).  Medication Reconciliation: complete    "

## 2017-04-25 NOTE — PATIENT INSTRUCTIONS
Treatment Plan:    Increase Abilify (aripriprazole) to 5 mg by mouth daily at bedtime for mood. Use up 2 mg tablets first.     Increase Zoloft (sertraline) to 150 mg by mouth daily for depression and anxiety.     Increase Klonopin (clonazepam) to 0.5 mg by mouth up to 3 times by mouth daily for anxiety.     Continue all other medications as reviewed per electronic medical record today.     All questions addressed. Education and counseling completed regarding risks and benefits of medications and psychotherapy options.    Safety plan was reviewed. To the Emergency Department as needed or call after hours crisis line at [phone] or [phone].     Continue individual/group therapy as planned with SARAHI Muir, [facility]     Schedule an appointment with me in 3-5 weeks or sooner as needed. Call [facility] at [phone] to schedule.    Follow up with primary care provider as planned or for acute medical concerns.    Call the psychiatric nurse line with medication questions or concerns at [phone].    My Practice Policy was reviewed and signed: YES     MyChart may be used to communicate with your provider, but this is not intended to be used for emergencies.

## 2017-04-25 NOTE — MR AVS SNAPSHOT
After Visit Summary   4/25/2017    Carolyn Jacobs    MRN: [id number]           Patient Information     Date Of Birth          1990        Visit Information        Provider Department      4/25/2017 2:45 PM Carmenza Menard NP [facility]        Today's Diagnoses     Postpartum anxiety          Care Instructions    Treatment Plan:    Increase Abilify (aripriprazole) to 5 mg by mouth daily at bedtime for mood. Use up 2 mg tablets first.     Increase Zoloft (sertraline) to 150 mg by mouth daily for depression and anxiety.     Increase Klonopin (clonazepam) to 0.5 mg by mouth up to 3 times by mouth daily for anxiety.     Continue all other medications as reviewed per electronic medical record today.     All questions addressed. Education and counseling completed regarding risks and benefits of medications and psychotherapy options.    Safety plan was reviewed. To the Emergency Department as needed or call after hours crisis line at [phone] or [phone].     Continue individual/group therapy as planned with SARAHI Muir, [facility]     Schedule an appointment with me in 3-5 weeks or sooner as needed. Call [facility] at [phone] to schedule.    Follow up with primary care provider as planned or for acute medical concerns.    Call the psychiatric nurse line with medication questions or concerns at [phone].    My Practice Policy was reviewed and signed: YES     MyChart may be used to communicate with your provider, but this is not intended to be used for emergencies.        Follow-ups after your visit        Follow-up notes from your care team     Return in about 4 weeks (around 5/23/2017).      Your next 10 appointments already scheduled     Apr 27, 2017  1:45 PM CDT   [facility] with Valdemar Martinez MD   [facility]    [address] [address] [phone]             May 02, 2017  1:30 PM CDT   [facility] SARAHI Mart[facility]nez   [facility]    [address]  [facility] [address] [phone]            May 10, 2017 10:15 AM CDT   Return Visit with Carmenza Menard NP   [facility]    [address] [place]  [address]  [facility] [address] [phone]              Who to contact     If you have questions or need follow up information about today's clinic visit or your schedule please contact [facility] directly at [phone].  Normal or non-critical lab and imaging results will be communicated to you by [employer], letter or phone within 4 business days after the clinic has received the results. If you do not hear from us within 7 days, please contact the clinic through [employer] or phone. If you have a critical or abnormal lab result, we will notify you by phone as soon as possible.  Submit refill requests through [employer] or call your pharmacy and they will forward the refill request to us. Please allow 3 business days for your refill to be completed.          Additional Information About Your Visit        [employer] Information     [employer] gives you secure access to your electronic health record. If you see a primary care provider, you can also send messages to your care team and make appointments. If you have questions, please call your primary care clinic.  If you do not have a primary care provider, please call [phone] and they will assist you.        Care EveryWhere ID     This is your Care EveryWhere ID. This could be used by other organizations to access your [facility] medical records  [id number]        Your Vitals Were     Pulse Temperature Pulse Oximetry BMI (Body Mass Index)          94 97.7  F (36.5  C) (Oral) 100% 26.43 kg/m2         Blood Pressure from Last 3 Encounters:   04/25/17 102/60   04/12/17 100/60    04/06/17 112/70    Weight from Last 3 Encounters:   04/25/17 154 lb (69.9 kg)   04/12/17 160 lb (72.6 kg)   04/06/17 161 lb (73 kg)              Today, you had the following     No orders found for display         Today's Medication Changes          These changes are accurate as of: 4/25/17  3:22 PM.  If you have any questions, ask your nurse or doctor.               These medicines have changed or have updated prescriptions.        Dose/Directions    ARIPiprazole 5 MG tablet   Commonly known as:  ABILIFY   This may have changed:    - medication strength  - how much to take  - additional instructions   Used for:  Postpartum anxiety   Changed by:  Carmenza Menard NP        Dose:  5 mg   Take 1 tablet (5 mg) by mouth At Bedtime Increased dose.   Quantity:  30 tablet   Refills:  1       clonazePAM 0.5 MG tablet   Commonly known as:  klonoPIN   This may have changed:    - when to take this  - additional instructions   Used for:  Postpartum anxiety   Changed by:  Carmenza Menard NP        Dose:  0.5 mg   Take 1 tablet (0.5 mg) by mouth 3 times daily as needed for anxiety Increased dose.   Quantity:  90 tablet   Refills:  1       sertraline 100 MG tablet   Commonly known as:  ZOLOFT   This may have changed:    - how much to take  - additional instructions   Used for:  Postpartum anxiety        Dose:  150 mg   Take 1.5 tablets (150 mg) by mouth daily Increased dose.   Quantity:  45 tablet   Refills:  1            Where to get your medicines      These medications were sent to [facility] [place], [place] - [address]  [facility] [address] [facility] [address]     Phone:  [phone]     ARIPiprazole 5 MG tablet         Some of these will need a paper prescription and others can be bought over the counter.  Ask your nurse if you have questions.     Bring a paper prescription for each of these medications     clonazePAM 0.5 MG tablet                Primary Care Provider Office Phone # Fax #     Arleen Napier, JOSE [phone]       [facility] [address] [facility] [address]        Thank you!     Thank you for choosing [facility]  for your care. Our goal is always to provide you with excellent care. Hearing back from our patients is one way we can continue to improve our services. Please take a few minutes to complete the written survey that you may receive in the mail after your visit with us. Thank you!             Your Updated Medication List - Protect others around you: Learn how to safely use, store and throw away your medicines at www.disposemymeds.org.          This list is accurate as of: 4/25/17  3:22 PM.  Always use your most recent med list.                   Brand Name Dispense Instructions for use    ARIPiprazole 5 MG tablet    ABILIFY    30 tablet    Take 1 tablet (5 mg) by mouth At Bedtime Increased dose.       clonazePAM 0.5 MG tablet    klonoPIN    90 tablet    Take 1 tablet (0.5 mg) by mouth 3 times daily as needed for anxiety Increased dose.       ibuprofen 800 MG tablet    ADVIL/MOTRIN    90 tablet    Take 1 tablet (800 mg) by mouth every 8 hours as needed for moderate pain       sertraline 100 MG tablet    ZOLOFT    45 tablet    Take 1.5 tablets (150 mg) by mouth daily Increased dose.

## 2017-04-25 NOTE — PROGRESS NOTES
"    Outpatient Psychiatric Progress Note    Name: Carolyn Jacobs   : 1990                    Primary Care Provider: JOSE Bonilla CNP - last visit 2017  Therapist: Magaly Redd - last visit 2017    PHQ-9 scores:  PHQ-9 SCORE 2017   Total Score 18 9 17       REILLY-7 scores:  REILLY-7 SCORE 2017   Total Score 21 10 21       Patient Identification:  Patient is a 27 year old year old, partnered / significant other  White American female  who presents for return visit with me.  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 Patient attended the session alone. Patient prefers to be called: \"Carolyn\"    Interim History:    I last saw Carolyn Jacobs for outpatient psychiatry Consultation on 2017.     During that appointment, we Continue Zoloft (sertraline) 100 mg by mouth daily for depression and anxiety.    Start Abilify (aripriprazole) 2 mg by mouth daily.     Continue Klonopin (clonazepam) 0.5 mg by mouth up to 2 times per day for anxiety per primary care provider    Current medications include:   Current Outpatient Prescriptions   Medication Sig     ARIPiprazole (ABILIFY) 2 MG tablet Take 1 tablet (2 mg) by mouth At Bedtime     clonazePAM (KLONOPIN) 0.5 MG tablet Take 1 tablet (0.5 mg) by mouth 2 times daily as needed for anxiety     ibuprofen (ADVIL/MOTRIN) 800 MG tablet Take 1 tablet (800 mg) by mouth every 8 hours as needed for moderate pain     sertraline (ZOLOFT) 100 MG tablet Take 1.5 tablets (150 mg) by mouth daily Increased dose. (Patient taking differently: Take 100 mg by mouth daily Increased dose.)     No current facility-administered medications for this visit.        The [place] Prescription Monitoring Program has been reviewed and there are no concerns about diversionary activity for controlled substances at this time.  Klonopin (clonazepam) 0.5 mg 60 tablets filled on 2017 by Primary Care Provider     I was able to review " "most recent Primary Care Provider, specialty provider, and therapy visit notes that I have access to.     Carolyn ESTEPHANIE Jacobs reports mood has been: \"Abilify (aripriprazole) and Zoloft (sertraline) working well\"  Anxiety has been: \" Klonopin (clonazepam) helps some, but I think I have reached tolerance \" Is taking more than prescribed lately. Anxiety has been higher lately. Also has been struggling with panic daily. Has been tracking moods with SARAHI Muir, [facility] lately. Anxiety and irritability are daily issues.   Sleep has been: \"same\" still struggling due to poor sleep environment with . Also has rumination and anxiety before bed. Still does not like the night time. Fear of dark remains.  Ongoing rituals to clean to manage anxiety.   PHQ9 and GAD7 scores were reviewed today.   Medication side effects: Denies. No weight gain.   Current stressors include: Symptoms, Parenting Stress  Coping mechanisms and supports include: Children, Therapy, Mother, Sister, Essential Oils    Past medication trials include but are not limited to:   Klonopin (clonazepam) 2014 and just restarted about 7 weeks ago  Zoloft (sertraline)  Celexa (citalopram) 2014  Lexapro (escitalopram) 2014  Prozac (fluoxetine) 2015  Wellbutrin (buproprion) ?  Vistaril/Atarax (hydroxyzine)  Adderall (amphetamine salts)   Ritalin (methylphenidate)   Ativan (lorazepam) 2015  Chantix (varenicline)     Past Medical History:   Diagnosis Date     Abnormal Pap smear     HPV & normal      Anemia     not on iron vits, O+ blood type     Anxiety      Panic attacks      Varicosities       has a past medical history of Abnormal Pap smear; Anemia; Anxiety; Panic attacks; and Varicosities.    Social History:  Current Living situation: [place], [place] with Spouse/Partner, Son and Daughter and Mom and Sister . Feels safe at home.  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 No Nicotine Replacement Therapy tried: Cold turkey, Chantix and went back to smoking due to stress   Caffeine:  Yes 1-2 cups/day of coffee  Recovery Programming Involvement: None    Vital Signs:   /60 (BP Location: Right arm, Patient Position: Chair, Cuff Size: Adult Regular)  Pulse 94  Temp 97.7  F (36.5  C) (Oral)  Wt 154 lb (69.9 kg)  SpO2 100%  BMI 26.43 kg/m2    Labs:  Most recent laboratory results reviewed and no new labs.    Review of Systems:  10 systems (general, cardiovascular, respiratory, eyes, ENT, endocrine, GI, , M/S, neurological) were reviewed. Most pertinent finding(s) is/are: headaches alleviated by over the counter medications. The remaining systems are all unremarkable.    Mental Status Examination:  Appearance: awake, alert, adequately groomed, appeared stated age, mild distress, normal weight, with infant daughter, and wears makeup  Attitude: cooperative   Eye Contact: adequate  Gait and Station: Normal, No assistive Devices used and No dizziness or falls  Psychomotor Behavior: no evidence of tardive dyskinesia, dystonia, or tics  Oriented to: time, person, and place  Attention Span and Concentration: Normal  Speech: clear, coherent, regular rate, regular rhythm and fluent  Mood: anxious and sad, better, but still anxious  Affect: mood congruent, intensity is blunted, reactive   Associations: no loose associations  Thought Process: logical, linear and goal oriented  Thought Content: no evidence of suicidal ideation or homicidal ideation, no evidence of psychotic thought, no auditory hallucinations present, no visual hallucinations present and Appropriate to Interview  Recent and Remote Memory: intact Not formally assessed. No amnesia.  Fund of Knowledge: appropriate  Insight: fair  Judgment: fair  Impulse Control: fair    Suicide Risk Assessment:  Today Carolyn Jacobs reports \"I would not harm myself because I can't do that to my children\" Reports only one suicidal " thought after first starting Zoloft (sertraline). In addition, there are notable risk factors for self-harm, including age and anxiety. However, risk is mitigated by commitment to family, sobriety, absence of past attempts, ability to volunteer a safety plan, history of seeking help when needed, future oriented, no access to firearms or weapons, identifies reasons to live including children, denies suicidal intent or plan, no family history of suicide and denies homicidal ideation, intent, or plan. Therefore, based on all available evidence including the factors cited above, Carolyn Jacosb does not appear to be at imminent risk for self-harm, does not meet criteria for a 72-hr hold, and therefore remains appropriate for ongoing outpatient level of care. A thorough assessment of risk factors related to suicide and self-harm have been reviewed and are noted above. The patient convincingly denies suicidality on several occasions. Local community resources reviewed and printed for patient to use if needed. There was no deceit detected, and the patient presented in a manner that was believable.     DSM5 Diagnosis:  296.32 Major Depressive Disorder, Recurrent Episode, Moderate With anxious distress and With peripartum onset   Rule out 301.13 Cyclothymic Disorder vs Bipolar Disorder  300.02 (F41.1) Generalized Anxiety Disorder   Rule out Obsessive Compulsive Disorder (OCD)     Medical comorbidities include:   Patient Active Problem List    Diagnosis Date Noted     REILLY (generalized anxiety disorder) 2017     Priority: Medium     Moderate episode of recurrent major depressive disorder (H) 2017     Priority: Medium      (spontaneous vaginal delivery) 2017     Priority: Medium     Indication for care in labor or delivery 2017     Priority: Medium     Decreased fetal movement 2017     Priority: Medium     Supervision of normal pregnancy 2016     Priority: Medium     Dysthymia 10/20/2015      Priority: Medium     Migraine 07/15/2015     Priority: Medium     Acetabular labrum tear 01/03/2015     Priority: Medium     Postpartum anxiety 10/10/2014     Priority: Medium     CARDIOVASCULAR SCREENING; LDL GOAL LESS THAN 160 09/29/2014     Priority: Medium       Psychosocial & Contextual Factors:  Parent/Child Relationship Difficulties and Relationship Difficulties    Assessment:  Carolyn ESTEPHANIE Jacobs reports ongoing anxiety. Medication side effects and alternatives were reviewed. Health promotion activities recommended and reviewed today. Has not been trialed on Neurontin (gabapentin). Will continue to monitor mood closely if we continue with SSRI/SNRI medications. No past trial of SNRI. May benefit from Effexor (venlafaxine). Also will continue to consider reduction in Klonopin (clonazepam) and trial of Inderal (propranolol) or Buspar (buspirone). Discussed one time increase in dose while we continue to adjust our other medications. Prescription printed and given to patient today.    Treatment Plan:    Increase Abilify (aripriprazole) to 5 mg by mouth daily at bedtime for mood. Use up 2 mg tablets first.     Increase Zoloft (sertraline) to 150 mg by mouth daily for depression and anxiety.     Increase Klonopin (clonazepam) to 0.5 mg by mouth up to 3 times by mouth daily for anxiety.     Continue all other medications as reviewed per electronic medical record today.     All questions addressed. Education and counseling completed regarding risks and benefits of medications and psychotherapy options.    Safety plan was reviewed. To the Emergency Department as needed or call after hours crisis line at [phone] or [phone].     Continue individual/group therapy as planned with SARAHI Muir, [facility]     Schedule an appointment with me in 3-5 weeks or sooner as needed. [phone] to schedule.    Follow up with primary care provider as planned or for acute  medical concerns.    Call the psychiatric nurse line with medication questions or concerns at [phone].    My Practice Policy was reviewed and signed: YES     MyChart may be used to communicate with your provider, but this is not intended to be used for emergencies.    Administrative Billing:   Time spent with patient was 30 minutes and greater than 50% of time or 20 minutes was spent in counseling and coordination of care.    Patient Status:  Patient will continue to be seen for ongoing consultation and stabilization.    Signed:   Carmenza Menard, PhD, APRN, CNP   Psychiatry

## 2017-04-26 ASSESSMENT — PATIENT HEALTH QUESTIONNAIRE - PHQ9: SUM OF ALL RESPONSES TO PHQ QUESTIONS 1-9: 17

## 2017-04-26 ASSESSMENT — ANXIETY QUESTIONNAIRES: GAD7 TOTAL SCORE: 21

## 2017-04-27 ENCOUNTER — OFFICE VISIT (OUTPATIENT)
Dept: OBGYN | Facility: CLINIC | Age: 27
End: 2017-04-27
Payer: COMMERCIAL

## 2017-04-27 VITALS
SYSTOLIC BLOOD PRESSURE: 102 MMHG | WEIGHT: 152 LBS | OXYGEN SATURATION: 98 % | HEART RATE: 70 BPM | BODY MASS INDEX: 26.09 KG/M2 | DIASTOLIC BLOOD PRESSURE: 64 MMHG

## 2017-04-27 DIAGNOSIS — Z30.09 ENCOUNTER FOR OTHER GENERAL COUNSELING OR ADVICE ON CONTRACEPTION: ICD-10-CM

## 2017-04-27 DIAGNOSIS — F41.8 POSTPARTUM ANXIETY: Primary | ICD-10-CM

## 2017-04-27 PROCEDURE — 99213 OFFICE O/P EST LOW 20 MIN: CPT | Mod: 24 | Performed by: OBSTETRICS & GYNECOLOGY

## 2017-04-27 NOTE — PROGRESS NOTES
Carolyn Jacobs is a white female , no contraception and not currently sexually active who presents to the clinic today for follow up of postpartum depression and anxiety. She notes that the Zoloft is helping significantly with her depression/sadness symptoms, and she did recently start Abilify as well. She feels that it is too soon to tell if it is helping yet, but the Abilify does make her sleepy. She even slept through her daughter crying and her sister had to wake her up. Her son continues to adjust to his new sister, and has been mimicking his baby sister. She feels that a lot of her depressions symptoms were rooted in the difficulty of adjusting to her daughter, all of the changes for her son, and her lack of support. She has limited support during the day getting to appointments and taking care of the children, but her sister and the father are trying to help during the evenings.   She continues to work with SARAHI Muir, [facility]  and they are meeting every 2 weeks as well as Carmenza Menard, PhD, APRN, CNP  [facility]. She is being screened for bipolar depression as well because of her family history.   She notes that her anxiety symptoms do continue, and she will still have panic attacks. She feels that over all she feels that she is improving. She denies HI or SI.     She is currently bottle feeding and decided not to continue with breast feeding because of her current medications.     Contraception: Additionally, she would like to have Nexplanon placed as soon as possible. She is not currently sexually active. She has tried to use an IUD in the past, but she did not tolerate this well and had severe cramping. She does not want to be pregnant again for at least 3 years.     Past Medical History:   Diagnosis Date     Abnormal Pap smear     HPV & normal      Anemia     not on iron vits, O+ blood type     Anxiety      Panic attacks      Varicosities      Past Surgical History:    Procedure Laterality Date     C INDUCED ABORTN BY DIL/EVAC  2005     wisdom teeth       Family History   Problem Relation Age of Onset     Hypertension Mother      Neurologic Disorder Mother      migraine headache     Depression Sister      Depression Brother      Social History     Social History     Marital status: Single     Spouse name: N/A     Number of children: 0     Years of education: 11     Occupational History     student      Social History Main Topics     Smoking status: Former Smoker     Packs/day: 0.25     Years: 1.00     Types: Cigarettes     Quit date: 10/19/2014     Smokeless tobacco: Former User      Comment: 3 cigs per day     Alcohol use No     Drug use: No     Sexual activity: Yes     Partners: Male     Birth control/ protection: None     Other Topics Concern     Not on file     Social History Narrative       Allergies:  Review of patient's allergies indicates no known allergies.    Current Outpatient Prescriptions   Medication Sig Dispense Refill     ARIPiprazole (ABILIFY) 5 MG tablet Take 1 tablet (5 mg) by mouth At Bedtime Increased dose. 30 tablet 1     clonazePAM (KLONOPIN) 0.5 MG tablet Take 1 tablet (0.5 mg) by mouth 3 times daily as needed for anxiety Increased dose. 90 tablet 1     sertraline (ZOLOFT) 100 MG tablet Take 1.5 tablets (150 mg) by mouth daily Increased dose. (Patient taking differently: Take 100 mg by mouth daily Increased dose.) 45 tablet 1     ibuprofen (ADVIL/MOTRIN) 800 MG tablet Take 1 tablet (800 mg) by mouth every 8 hours as needed for moderate pain 90 tablet 1     Review Of Systems    Psychiatric: POSITIVE for anxiety, panic attacks, and depressed mood NEGATIVE For SI or HI      This document serves as a record of the services and decisions personally performed and made by Valdemar Martinez M.D. It was created on his behalf by January Sierra, a trained medical scribe. The creation of this document is based the provider's statements to the medical scribe.  January  Mariela April 27, 2017 1:31 PM     VITALS:  /64  Pulse 70  Wt 152 lb (68.9 kg)  SpO2 98%  BMI 26.09 kg/m2  EXAM:  Constitutional: healthy, alert and no distress    Assessment/Plan  (O99.345,  F41.1) Postpartum anxiety  (primary encounter diagnosis)  Comment: She appears to be improving, but my only concern is with the addiction potential of the clonazepam and we discussed possible alternatives. I would like her to discuss this with Carmenza Menard, PhD, APRN, CNP   Psychiatry  Alternatives such as Inderal discussed .   Plan: continue with current medication regimen and therapy visit    (Z30.09) Encounter for other general counseling or advice on contraception  Comment: will plan to place nexplanon next week  Plan: Follow up in 1 week.          The information in this document, created by the medical scribe for me, accurately reflects the services I personally performed and the decisions made by me. I have reviewed and approved this document for accuracy prior to leaving the patient care area.  4/27/2017 1:31 PM     15 min spent w > 50% in counseling     Valdemar Martinez M.D.      Approximately 25 minutes were spent with the patient, of which > 50% was spent in face to face counselling.

## 2017-04-27 NOTE — NURSING NOTE
"Chief Complaint   Patient presents with     RECHECK     anxiety        Initial /64  Pulse 70  Wt 152 lb (68.9 kg)  SpO2 98%  BMI 26.09 kg/m2 Estimated body mass index is 26.09 kg/(m^2) as calculated from the following:    Height as of 17: 5' 4\" (1.626 m).    Weight as of this encounter: 152 lb (68.9 kg).  BP completed using cuff size: regular        The following HM Due: NONE      The following patient reported/Care Every where data was sent to:  P ABSTRACT QUALITY INITIATIVES [[id number]      Pt would like nexplanon.      Ina Mathur, [facility]                 "

## 2017-04-27 NOTE — PATIENT INSTRUCTIONS
You can reach your [facility] Care Team any time of the day by calling [phone]. This number will put you in touch with the 24 hour nurse line if the clinic is closed.    To contact your OB/GYN Surgery Scheduler please call [phone]. This is a direct number for your care team between 8 a.m. and 4 p.m. Monday through Friday.    [facility] is open for your convenience: [phone]  Monday through Friday 8 a.m. to 8:30 p.m.  Saturday 9 a.m. to 6 p.m.  Sunday Noon to 6 p.m.    They are closed on all major holidays.

## 2017-04-27 NOTE — MR AVS SNAPSHOT
After Visit Summary   4/27/2017    Carolyn Jacobs    MRN: [id number]           Patient Information     Date Of Birth          1990        Visit Information        Provider Department      4/27/2017 1:45 PM Valdemar Martinez MD [facility]        Today's Diagnoses     Postpartum anxiety    -  1    Encounter for other general counseling or advice on contraception          Care Instructions    You can reach your [facility] Care Team any time of the day by calling [phone]. This number will put you in touch with the 24 hour nurse line if the clinic is closed.    To contact your OB/GYN Surgery Scheduler please call [phone]. This is a direct number for your care team between 8 a.m. and 4 p.m. Monday through Friday.    [facility] is open for your convenience: [phone]  Monday through Friday 8 a.m. to 8:30 p.m.  Saturday 9 a.m. to 6 p.m.  Sunday Noon to 6 p.m.    They are closed on all major holidays.           Follow-ups after your visit        Follow-up notes from your care team     Return in about 1 week (around 5/4/2017).      Your next 10 appointments already scheduled     May 02, 2017  1:30 PM CDT   [facility]    [address]  [facility] [address] [phone]            May 10, 2017 10:15 AM CDT   Return Visit with Carmenza Menard NP   [facility] [address]  [address]  [facility] [address] [phone]              Who to contact     If you have questions or need follow up information about today's clinic visit or your schedule please contact [facility] directly at [phone].  Normal or non-critical lab and imaging results will be communicated to you by MyChart, letter or phone within 4 business days after the clinic has received the results. If you  do not hear from us within 7 days, please contact the clinic through [employer] or phone. If you have a critical or abnormal lab result, we will notify you by phone as soon as possible.  Submit refill requests through [employer] or call your pharmacy and they will forward the refill request to us. Please allow 3 business days for your refill to be completed.          Additional Information About Your Visit        [employer] Information     [employer] gives you secure access to your electronic health record. If you see a primary care provider, you can also send messages to your care team and make appointments. If you have questions, please call your primary care clinic.  If you do not have a primary care provider, please call [phone] and they will assist you.        Care EveryWhere ID     This is your Care EveryWhere ID. This could be used by other organizations to access your [facility] medical records  [id number]        Your Vitals Were     Pulse Pulse Oximetry BMI (Body Mass Index)             70 98% 26.09 kg/m2          Blood Pressure from Last 3 Encounters:   04/27/17 102/64   04/25/17 102/60   04/12/17 100/60    Weight from Last 3 Encounters:   04/27/17 152 lb (68.9 kg)   04/25/17 154 lb (69.9 kg)   04/12/17 160 lb (72.6 kg)              Today, you had the following     No orders found for display         Today's Medication Changes          These changes are accurate as of: 4/27/17  2:46 PM.  If you have any questions, ask your nurse or doctor.               These medicines have changed or have updated prescriptions.        Dose/Directions    sertraline 100 MG tablet   Commonly known as:  ZOLOFT   This may have changed:    - how much to take  - additional instructions   Used for:  Postpartum anxiety        Dose:  150 mg   Take 1.5 tablets (150 mg) by mouth daily Increased dose.   Quantity:  45 tablet   Refills:  1                Primary Care Provider Office Phone # Fax #    JOSE Bonilla [phone]  [phone]       [facility] [address]  [facility] [address]        Thank you!     Thank you for choosing [facility]  for your care. Our goal is always to provide you with excellent care. Hearing back from our patients is one way we can continue to improve our services. Please take a few minutes to complete the written survey that you may receive in the mail after your visit with us. Thank you!             Your Updated Medication List - Protect others around you: Learn how to safely use, store and throw away your medicines at www.disposemymeds.org.          This list is accurate as of: 4/27/17  2:46 PM.  Always use your most recent med list.                   Brand Name Dispense Instructions for use    ARIPiprazole 5 MG tablet    ABILIFY    30 tablet    Take 1 tablet (5 mg) by mouth At Bedtime Increased dose.       clonazePAM 0.5 MG tablet    klonoPIN    90 tablet    Take 1 tablet (0.5 mg) by mouth 3 times daily as needed for anxiety Increased dose.       ibuprofen 800 MG tablet    ADVIL/MOTRIN    90 tablet    Take 1 tablet (800 mg) by mouth every 8 hours as needed for moderate pain       sertraline 100 MG tablet    ZOLOFT    45 tablet    Take 1.5 tablets (150 mg) by mouth daily Increased dose.

## 2017-05-02 ENCOUNTER — OFFICE VISIT (OUTPATIENT)
Dept: BEHAVIORAL HEALTH | Facility: CLINIC | Age: 27
End: 2017-05-02
Payer: COMMERCIAL

## 2017-05-02 DIAGNOSIS — F33.1 MODERATE EPISODE OF RECURRENT MAJOR DEPRESSIVE DISORDER (H): ICD-10-CM

## 2017-05-02 DIAGNOSIS — F41.1 GAD (GENERALIZED ANXIETY DISORDER): Primary | ICD-10-CM

## 2017-05-02 PROBLEM — O36.8190 DECREASED FETAL MOVEMENT: Status: RESOLVED | Noted: 2017-02-16 | Resolved: 2017-05-02

## 2017-05-02 PROCEDURE — 90834 PSYTX W PT 45 MINUTES: CPT | Performed by: MARRIAGE & FAMILY THERAPIST

## 2017-05-02 ASSESSMENT — ANXIETY QUESTIONNAIRES
1. FEELING NERVOUS, ANXIOUS, OR ON EDGE: NEARLY EVERY DAY
5. BEING SO RESTLESS THAT IT IS HARD TO SIT STILL: NEARLY EVERY DAY
GAD7 TOTAL SCORE: 21
3. WORRYING TOO MUCH ABOUT DIFFERENT THINGS: NEARLY EVERY DAY
6. BECOMING EASILY ANNOYED OR IRRITABLE: NEARLY EVERY DAY
7. FEELING AFRAID AS IF SOMETHING AWFUL MIGHT HAPPEN: NEARLY EVERY DAY
IF YOU CHECKED OFF ANY PROBLEMS ON THIS QUESTIONNAIRE, HOW DIFFICULT HAVE THESE PROBLEMS MADE IT FOR YOU TO DO YOUR WORK, TAKE CARE OF THINGS AT HOME, OR GET ALONG WITH OTHER PEOPLE: VERY DIFFICULT
2. NOT BEING ABLE TO STOP OR CONTROL WORRYING: NEARLY EVERY DAY

## 2017-05-02 ASSESSMENT — PATIENT HEALTH QUESTIONNAIRE - PHQ9: 5. POOR APPETITE OR OVEREATING: NEARLY EVERY DAY

## 2017-05-02 NOTE — PROGRESS NOTES
[facility]  May 2, 2017      Behavioral Health Clinician Progress Note    Patient Name: Carolyn Jacobs           Service Type: Individual      Service Location:   Face to Face in Clinic     Session Start Time: 1:45  Session End Time: 2:30      Session Length: 38 - 52      Attendees: Client    Visit Activities (Refresh list every visit): [facility] Only    Diagnostic Assessment Date: 3/14/17  Treatment Plan Review Date: 3/22/17  See Flowsheets for today's PHQ-9 and REILLY-7 results  Previous PHQ-9:   PHQ-9 SCORE 4/19/2017 4/25/2017 5/2/2017   Total Score - - -   Total Score 9 17 16     Previous REILLY-7:   REILLY-7 SCORE 4/19/2017 4/25/2017 5/2/2017   Total Score - - -   Total Score 10 21 21       SARAI LEVEL:  No flowsheet data found.    DATA  Extended Session (60+ minutes): No  Interactive Complexity: No  Crisis: No    Treatment Objective(s) Addressed in This Session:  Target Behavior(s): diet/weight loss    Depressed Mood: Increase interest, engagement, and pleasure in doing things  Decrease frequency and intensity of feeling down, depressed, hopeless  Improve quantity and quality of night time sleep / decrease daytime naps  Feel less tired and more energy during the day   Improve diet, appetite, mindful eating, and / or meal planning  Identify negative self-talk and behaviors: challenge core beliefs, myths, and actions  Improve concentration, focus, and mindfulness in daily activities   Feel less fidgety, restless or slow in daily activities / interpersonal interactions  Anxiety: will experience a reduction in anxiety, will develop more effective coping skills to manage anxiety symptoms, will develop healthy cognitive patterns and beliefs and will increase ability to function adaptively    Current Stressors / Issues:  Pt likes headspace. Is struggling with her relationship with her partner. Discussed with pt how others might never meet her expectations. Discussed her starting therapy with [facility].  Told her I get her referral. Worked on ways to help with her anxiety. Dicussed with pt the need to add exercise back into her life.         Progress on Treatment Objective(s) / Homework:  Minimal progress - CONTEMPLATION (Considering change and yet undecided); Intervened by assessing the negative and positive thinking (ambivalence) about behavior change    Motivational Interviewing    MI Intervention: Co-Developed Goal: reduce her anxiety and Expressed Empathy/Understanding     Change Talk Expressed by the Patient: Desire to change    Provider Response to Change Talk: E - Evoked more info from patient about behavior change      Situation        Automatic Thoughts  Cognitive Distortions      Feelings        Behavior        Questioning Thoughts              Care Plan review completed: Yes    Medication Review:  No changes to current psychiatric medication(s)    Medication Compliance:  NA    Changes in Health Issues:   None reported    Chemical Use Review:   Substance Use: Chemical use reviewed, no active concerns identified      Tobacco Use: No current tobacco use.      Assessment: Current Emotional / Mental Status (status of significant symptoms):  Risk status (Self / Other harm or suicidal ideation)  Patient denies a history of suicidal ideation, suicide attempts, self-injurious behavior, homicidal ideation, homicidal behavior and and other safety concerns  Patient denies current fears or concerns for personal safety.  Patient denies current or recent suicidal ideation or behaviors.  Patient denies current or recent homicidal ideation or behaviors.  Patient denies current or recent self injurious behavior or ideation.  Patient denies other safety concerns.  A safety and risk management plan has not been developed at this time, however patient was encouraged to call [facility] should there be a change in any of these risk factors.    Appearance:   Appropriate   Eye Contact:   Good   Psychomotor  Behavior: Normal   Attitude:   Cooperative   Orientation:   All  Speech   Rate / Production: Normal    Volume:  Normal   Mood:    Normal  Affect:    Appropriate  Flat   Thought Content:  Clear   Thought Form:  Coherent  Logical   Insight:    Good     Diagnoses:  296.32 Major Depressive Disorder, Recurrent Episode, Moderate _ and With anxious distress  300.02 (F41.1) Generalized Anxiety Disorder  Collateral Reports Completed:  Not Applicable    Plan: (Homework, other):  Patient was given information about behavioral services and encouraged to schedule a follow up appointment with the clinic [facility] in 2 weeks.  She was also given information about mental health symptoms and treatment options  and Cognitive Behavioral Therapy skills to practice when experiencing anxiety and depression.  CD Recommendations: No indications of CD issues.  SARAHI Muir, [facility]      ______________________________________________________________________    Integrated Primary Care Behavioral Health Treatment Plan    Patient's Name: Carolyn Jacobs  YOB: 1990    Date: 3/22/17    DSM-V Diagnoses: 296.32 Major Depressive Disorder, Recurrent Episode, Moderate _ and With anxious distress or 300.02 (F41.1) Generalized Anxiety Disorder  Psychosocial / Contextual Factors: money, family  WHODAS: 25    Referral / Collaboration:  The following referral(s) will be initiated: will see Carmenza on 4/6/16.    Anticipated number of session or this episode of care: 6-8      MeasurableTreatment Goal(s) related to diagnosis / functional impairment(s)  Goal 1: Patient will increase her coping skills to reduce her anxiety    I will know I've met my goal when less anxious.      Objective #A (Patient Action)    Patient will use cognitive strategies identified in therapy to challenge anxious thoughts.  Status: New - Date: 3/22/17     Intervention(s)  [facility] will assign homework look at personal values to help increase her postive goals for herself.  .        Patient has reviewed and agreed to the above plan.      Magaly Redd, LMFT  March 22, 2017

## 2017-05-02 NOTE — MR AVS SNAPSHOT
After Visit Summary   5/2/2017    Carolyn Jacobs    MRN: [id number]           Patient Information     Date Of Birth          1990        Visit Information        Provider Department      5/2/2017 1:30 PM Magaly Redd LMFT [facility]        Today's Diagnoses     REILLY (generalized anxiety disorder)    -  1    Moderate episode of recurrent major depressive disorder (H)           Follow-ups after your visit        Your next 10 appointments already scheduled     May 10, 2017 10:15 AM CDT   Return Visit with Carmenza Menard NP   [facility] ([facility])    [address]  [address]  [facility] [address]   [phone]            May 17, 2017  1:30 PM CDT   Office Visit with JOSE Rodriguez CNM   [facility] ([facility])    [address] [id number] [phone]           Bring a current list of meds and any records pertaining to this visit.  For Physicals, please bring immunization records and any forms needing to be filled out.  Please arrive 10 minutes early to complete paperwork.            May 23, 2017  1:30 PM CDT   Return Visit with SARAHI Marítnez   [facility] ([facility])    [address]  [facility] [id number] [phone]              Who to contact     If you have questions or need follow up information about today's clinic visit or your schedule please contact [facility] directly at [phone].  Normal or non-critical lab and imaging results will be communicated to you by MyChart, letter or phone within 4 business days after the clinic has received the results. If you do not hear from us within 7 days, please contact the clinic through MyChart or phone. If you have a critical or abnormal lab result, we will notify you by phone as soon as possible.  Submit  refill requests through [employer] or call your pharmacy and they will forward the refill request to us. Please allow 3 business days for your refill to be completed.          Additional Information About Your Visit        [employer] Information     [employer] gives you secure access to your electronic health record. If you see a primary care provider, you can also send messages to your care team and make appointments. If you have questions, please call your primary care clinic.  If you do not have a primary care provider, please call [phone] and they will assist you.        Care EveryWhere ID     This is your Care EveryWhere ID. This could be used by other organizations to access your [place] medical records  [id number]         Blood Pressure from Last 3 Encounters:   04/27/17 102/64   04/25/17 102/60   04/12/17 100/60    Weight from Last 3 Encounters:   04/27/17 68.9 kg (152 lb)   04/25/17 69.9 kg (154 lb)   04/12/17 72.6 kg (160 lb)              Today, you had the following     No orders found for display         Today's Medication Changes          These changes are accurate as of: 5/2/17  3:27 PM.  If you have any questions, ask your nurse or doctor.               These medicines have changed or have updated prescriptions.        Dose/Directions    sertraline 100 MG tablet   Commonly known as:  ZOLOFT   This may have changed:    - how much to take  - additional instructions   Used for:  Postpartum anxiety        Dose:  150 mg   Take 1.5 tablets (150 mg) by mouth daily Increased dose.   Quantity:  45 tablet   Refills:  1                Primary Care Provider Office Phone # Fax #    JOSE Bonilla [facility] [phone]       [facility] [address] [facility] [address]        Thank you!     Thank you for choosing [facility]  for your care. Our goal is always to provide you with excellent care. Hearing back from our patients is one way we can continue to improve  our services. Please take a few minutes to complete the written survey that you may receive in the mail after your visit with us. Thank you!             Your Updated Medication List - Protect others around you: Learn how to safely use, store and throw away your medicines at www.disposemymeds.org.          This list is accurate as of: 5/2/17  3:27 PM.  Always use your most recent med list.                   Brand Name Dispense Instructions for use    ARIPiprazole 5 MG tablet    ABILIFY    30 tablet    Take 1 tablet (5 mg) by mouth At Bedtime Increased dose.       clonazePAM 0.5 MG tablet    klonoPIN    90 tablet    Take 1 tablet (0.5 mg) by mouth 3 times daily as needed for anxiety Increased dose.       ibuprofen 800 MG tablet    ADVIL/MOTRIN    90 tablet    Take 1 tablet (800 mg) by mouth every 8 hours as needed for moderate pain       sertraline 100 MG tablet    ZOLOFT    45 tablet    Take 1.5 tablets (150 mg) by mouth daily Increased dose.

## 2017-05-03 ASSESSMENT — ANXIETY QUESTIONNAIRES: GAD7 TOTAL SCORE: 21

## 2017-05-03 ASSESSMENT — PATIENT HEALTH QUESTIONNAIRE - PHQ9: SUM OF ALL RESPONSES TO PHQ QUESTIONS 1-9: 16

## 2017-05-10 ENCOUNTER — OFFICE VISIT (OUTPATIENT)
Dept: PSYCHIATRY | Facility: CLINIC | Age: 27
End: 2017-05-10
Payer: COMMERCIAL

## 2017-05-10 VITALS
HEIGHT: 64 IN | HEART RATE: 117 BPM | BODY MASS INDEX: 24.75 KG/M2 | SYSTOLIC BLOOD PRESSURE: 98 MMHG | DIASTOLIC BLOOD PRESSURE: 60 MMHG | TEMPERATURE: 97.8 F | WEIGHT: 145 LBS | OXYGEN SATURATION: 98 %

## 2017-05-10 DIAGNOSIS — F41.8 POSTPARTUM ANXIETY: ICD-10-CM

## 2017-05-10 PROCEDURE — 99214 OFFICE O/P EST MOD 30 MIN: CPT | Performed by: NURSE PRACTITIONER

## 2017-05-10 RX ORDER — CLONAZEPAM 1 MG/1
1 TABLET ORAL 3 TIMES DAILY PRN
Qty: 90 TABLET | Refills: 1 | Status: SHIPPED | OUTPATIENT
Start: 2017-05-10 | End: 2017-06-15

## 2017-05-10 RX ORDER — SERTRALINE HYDROCHLORIDE 100 MG/1
150 TABLET, FILM COATED ORAL DAILY
Qty: 45 TABLET | Refills: 1 | Status: SHIPPED | OUTPATIENT
Start: 2017-05-10 | End: 2017-06-15

## 2017-05-10 ASSESSMENT — PATIENT HEALTH QUESTIONNAIRE - PHQ9: 5. POOR APPETITE OR OVEREATING: NEARLY EVERY DAY

## 2017-05-10 ASSESSMENT — ANXIETY QUESTIONNAIRES
5. BEING SO RESTLESS THAT IT IS HARD TO SIT STILL: NEARLY EVERY DAY
2. NOT BEING ABLE TO STOP OR CONTROL WORRYING: NEARLY EVERY DAY
1. FEELING NERVOUS, ANXIOUS, OR ON EDGE: NEARLY EVERY DAY
6. BECOMING EASILY ANNOYED OR IRRITABLE: NEARLY EVERY DAY
IF YOU CHECKED OFF ANY PROBLEMS ON THIS QUESTIONNAIRE, HOW DIFFICULT HAVE THESE PROBLEMS MADE IT FOR YOU TO DO YOUR WORK, TAKE CARE OF THINGS AT HOME, OR GET ALONG WITH OTHER PEOPLE: VERY DIFFICULT
7. FEELING AFRAID AS IF SOMETHING AWFUL MIGHT HAPPEN: NEARLY EVERY DAY
GAD7 TOTAL SCORE: 21
3. WORRYING TOO MUCH ABOUT DIFFERENT THINGS: NEARLY EVERY DAY

## 2017-05-10 NOTE — MR AVS SNAPSHOT
After Visit Summary   5/10/2017    Carolyn Jacobs    MRN: [id number]           Patient Information     Date Of Birth          1990        Visit Information        Provider Department      5/10/2017 10:15 AM Carmenza Menard NP [facility]        Today's Diagnoses     Postpartum anxiety          Care Instructions    Treatment Plan:    Continue Zoloft (sertraline) 150 mg by mouth daily for depression and anxiety.    Continue Abilify (aripriprazole) 5 mg by mouth daily.    Continue Klonopin (clonazepam) 1 mg by mouth up to 3 times by mouth daily for anxiety. This is the last dose increase.     Consider addition of Inderal (propranolol) or Buspar (buspirone) for anxiety.    Continue all other medications as reviewed per electronic medical record today.     All questions addressed. Education and counseling completed regarding risks and benefits of medications and psychotherapy options.    Safety plan was reviewed. To the Emergency Department as needed or call after hours crisis line at [phone] or [phone].     Continue individual/group therapy as planned with SARAHI Muir, [facility]     Schedule an appointment with me in 4 weeks or sooner as needed. Call [facility] at [phone] to schedule.    Follow up with primary care provider as planned or for acute medical concerns.    Call the psychiatric nurse line with medication questions or concerns at [phone].    My Practice Policy was reviewed and signed: YES     MyChart may be used to communicate with your provider, but this is not intended to be used for emergencies.          Follow-ups after your visit        Follow-up notes from your care team     Return in about 4 weeks (around 6/7/2017).      Your next 10 appointments already scheduled     May 17, 2017  1:30 PM CDT   Office Visit with JOSE Rodriguez CNM   [facility] ([facility]  [facility])    [address] [address] [phone]           Bring a current list of meds and any records pertaining to this visit.  For Physicals, please bring immunization records and any forms needing to be filled out.  Please arrive 10 minutes early to complete paperwork.            May 23, 2017  1:30 PM CDT   [facility] SARAHI Mart[facility]nez   [facility]    [address] [facility] [address]  [facility] [address] [phone]              Who to contact     If you have questions or need follow up information about today's clinic visit or your schedule please contact [facility] directly at [phone].  Normal or non-critical lab and imaging results will be communicated to you by MyChart, letter or phone within 4 business days after the clinic has received the results. If you do not hear from us within 7 days, please contact the clinic through [employer] or phone. If you have a critical or abnormal lab result, we will notify you by phone as soon as possible.  Submit refill requests through [email or web address] or call your pharmacy and they will forward the refill request to us. Please allow 3 business days for your refill to be completed.          Additional Information About Your Visit        [email or web address] Information     [email or web address] gives you secure access to your electronic health record. If you see a primary care provider, you can also send messages to your care team and make appointments. If you have questions, please call your primary care clinic.  If you do not have a primary care provider, please call [phone] and they will assist you.        Care EveryWhere ID     This is your Care EveryWhere ID. This could be used by other organizations to access your [place] medical records  [id number]        Your Vitals Were     Pulse Temperature Height Last Period Pulse Oximetry BMI (Body Mass Index)    117 97.8  F (36.6  C) (Oral) 5' 4\" " (1.626 m) 05/08/2017 98% 24.89 kg/m2       Blood Pressure from Last 3 Encounters:   05/10/17 98/60   04/27/17 102/64   04/25/17 102/60    Weight from Last 3 Encounters:   05/10/17 145 lb (65.8 kg)   04/27/17 152 lb (68.9 kg)   04/25/17 154 lb (69.9 kg)              Today, you had the following     No orders found for display         Today's Medication Changes          These changes are accurate as of: 5/10/17 10:52 AM.  If you have any questions, ask your nurse or doctor.               These medicines have changed or have updated prescriptions.        Dose/Directions    clonazePAM 1 MG tablet   Commonly known as:  klonoPIN   This may have changed:    - medication strength  - how much to take   Used for:  Postpartum anxiety   Changed by:  Carmenza Menard NP        Dose:  1 mg   Take 1 tablet (1 mg) by mouth 3 times daily as needed for anxiety Increased dose.   Quantity:  90 tablet   Refills:  1       sertraline 100 MG tablet   Commonly known as:  ZOLOFT   This may have changed:    - how much to take  - additional instructions   Used for:  Postpartum anxiety        Dose:  150 mg   Take 1.5 tablets (150 mg) by mouth daily Increased dose.   Quantity:  45 tablet   Refills:  1            Where to get your medicines      These medications were sent to [facility] [place], [place] - [facility].  [facility] [address]     Phone:  [phone]     sertraline 100 MG tablet         Some of these will need a paper prescription and others can be bought over the counter.  Ask your nurse if you have questions.     Bring a paper prescription for each of these medications     clonazePAM 1 MG tablet                Primary Care Provider Office Phone # Fax #    JOSE Bonilla [facility] [phone]       [facility] [address] [place] [address]        Thank you!     Thank you for choosing [facility]  for your care. Our goal is always  to provide you with excellent care. Hearing back from our patients is one way we can continue to improve our services. Please take a few minutes to complete the written survey that you may receive in the mail after your visit with us. Thank you!             Your Updated Medication List - Protect others around you: Learn how to safely use, store and throw away your medicines at www.disposemymeds.org.          This list is accurate as of: 5/10/17 10:52 AM.  Always use your most recent med list.                   Brand Name Dispense Instructions for use    ARIPiprazole 5 MG tablet    ABILIFY    30 tablet    Take 1 tablet (5 mg) by mouth At Bedtime Increased dose.       clonazePAM 1 MG tablet    klonoPIN    90 tablet    Take 1 tablet (1 mg) by mouth 3 times daily as needed for anxiety Increased dose.       ibuprofen 800 MG tablet    ADVIL/MOTRIN    90 tablet    Take 1 tablet (800 mg) by mouth every 8 hours as needed for moderate pain       sertraline 100 MG tablet    ZOLOFT    45 tablet    Take 1.5 tablets (150 mg) by mouth daily Increased dose.

## 2017-05-10 NOTE — NURSING NOTE
"Chief Complaint   Patient presents with     RECHECK     pt feels anxiety is worse, pt feels the medication is helping stabilize her mood        Initial BP 98/60 (BP Location: Right arm, Patient Position: Chair, Cuff Size: Adult Regular)  Pulse 117  Temp 97.8  F (36.6  C) (Oral)  Ht 5' 4\" (1.626 m)  Wt 145 lb (65.8 kg)  LMP 05/08/2017  SpO2 98%  BMI 24.89 kg/m2 Estimated body mass index is 24.89 kg/(m^2) as calculated from the following:    Height as of this encounter: 5' 4\" (1.626 m).    Weight as of this encounter: 145 lb (65.8 kg).  Medication Reconciliation: complete    "

## 2017-05-10 NOTE — PROGRESS NOTES
"    Outpatient Psychiatric Progress Note    Name: Carolyn Jacobs   : 1990                    Primary Care Provider: JOSE Bonilla CNP - last visit 2017  Therapist: SARAHI Muir, [facility]  - last visit 2017  OB: Valdemar Martinez MD - last visit 2017    PHQ-9 scores:  PHQ-9 SCORE 2017 2017 5/10/2017   Total Score 17 16 16       REILLY-7 scores:  REILLY-7 SCORE 2017 2017 5/10/2017   Total Score 21 21 21       Patient Identification:  Patient is a 27 year old year old, partnered / significant other  White American female  who presents for return visit with me.  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 Patient attended the session 2 children. Patient prefers to be called: \"Carolyn\"    Interim History:    I last saw Carolyn Jacobs for outpatient psychiatry Return Visit on 2017.     During that appointment, we Increase Abilify (aripriprazole) to 5 mg by mouth daily at bedtime for mood. Use up 2 mg tablets first.     Increase Zoloft (sertraline) to 150 mg by mouth daily for depression and anxiety.     Increase Klonopin (clonazepam) to 0.5 mg by mouth up to 3 times by mouth daily for anxiety.     Current medications include:   Current Outpatient Prescriptions   Medication Sig     ARIPiprazole (ABILIFY) 5 MG tablet Take 1 tablet (5 mg) by mouth At Bedtime Increased dose.     clonazePAM (KLONOPIN) 0.5 MG tablet Take 1 tablet (0.5 mg) by mouth 3 times daily as needed for anxiety Increased dose.     sertraline (ZOLOFT) 100 MG tablet Take 1.5 tablets (150 mg) by mouth daily Increased dose. (Patient taking differently: Take 100 mg by mouth daily Increased dose.)     ibuprofen (ADVIL/MOTRIN) 800 MG tablet Take 1 tablet (800 mg) by mouth every 8 hours as needed for moderate pain     No current facility-administered medications for this visit.        The [place] Prescription Monitoring Program has been reviewed and there are no concerns about diversionary activity " "for controlled substances at this time.  Klonopin (clonazepam) 0.5 mg, 90 tabs filled on 4/25/2017 from me. One fill remaining.     I was able to review most recent Primary Care Provider, specialty provider, and therapy visit notes that I have access to.     Carolyn Jacobs reports mood has been: \"more stable still ups and down\"  Anxiety has been: \"much higher\"  Sleep has been: \"okay\" disrupted   PHQ9 and GAD7 scores were reviewed today.   Medication side effects: Denies  Current stressors include: Symptoms, Parenting Stress  Coping mechanisms and supports include: Children, Therapy, Mother, Sister, Essential Oils    Past medication trials include but are not limited to:   Klonopin (clonazepam) 2014 and just restarted about 7 weeks ago  Zoloft (sertraline)  Celexa (citalopram) 2014  Lexapro (escitalopram) 2014  Prozac (fluoxetine) 2015  Wellbutrin (buproprion) ?  Vistaril/Atarax (hydroxyzine)  Adderall (amphetamine salts)   Ritalin (methylphenidate)   Ativan (lorazepam) 2015  Chantix (varenicline)     Past Medical History:   Diagnosis Date     Abnormal Pap smear     HPV & normal      Anemia     not on iron vits, O+ blood type     Anxiety      Panic attacks      Varicosities       has a past medical history of Abnormal Pap smear; Anemia; Anxiety; Panic attacks; and Varicosities.    Social History:  Current Living situation: [place], [place] with Spouse/Partner, Son and Daughter and Mom and Sister . Feels safe at home.  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 No Nicotine Replacement Therapy tried: Cold turkey, Chantix and went back to smoking due to stress   Caffeine: Yes 1-2 cups/day of coffee  Recovery Programming Involvement: None    Vital Signs:   BP 98/60 (BP Location: Right arm, Patient Position: Chair, Cuff Size: Adult Regular)  Pulse 117  Temp 97.8  F (36.6  C) (Oral)  Ht 5' 4\" (1.626 m)  Wt 145 lb (65.8 kg)  LMP " "05/08/2017  SpO2 98%  BMI 24.89 kg/m2    Labs:  Most recent laboratory results reviewed and no new labs.    Review of Systems:  10 systems (general, cardiovascular, respiratory, eyes, ENT, endocrine, GI, , M/S, neurological) were reviewed. Most pertinent finding(s) is/are: headaches alleviated by over the counter medications, dry mouth alleviated by water. The remaining systems are all unremarkable.    Mental Status Examination:  Appearance: awake, alert, adequately groomed, appeared stated age, mild distress, normal weight, with infant daughter and young son, and wears makeup  Attitude: cooperative   Eye Contact: adequate  Gait and Station: Normal, No assistive Devices used and No dizziness or falls  Psychomotor Behavior: no evidence of tardive dyskinesia, dystonia, or tics  Oriented to: time, person, and place  Attention Span and Concentration: Normal  Speech: clear, coherent, regular rate, regular rhythm and fluent  Mood: anxious and sad, better, but still anxious  Affect: mood congruent, intensity is blunted, reactive   Associations: no loose associations  Thought Process: logical, linear and goal oriented  Thought Content: no evidence of suicidal ideation or homicidal ideation, no evidence of psychotic thought, no auditory hallucinations present, no visual hallucinations present and Appropriate to Interview  Recent and Remote Memory: intact Not formally assessed. No amnesia.  Fund of Knowledge: appropriate  Insight: fair  Judgment: fair  Impulse Control: fair    Suicide Risk Assessment:  Today Carolyn Jacobs reports \"I would not harm myself because I can't do that to my children\". Reports only one suicidal thought after first starting Zoloft (sertraline). In addition, there are notable risk factors for self-harm, including age and anxiety. However, risk is mitigated by commitment to family, sobriety, absence of past attempts, ability to volunteer a safety plan, history of seeking help when needed, future " oriented, no access to firearms or weapons, identifies reasons to live including children, denies suicidal intent or plan, no family history of suicide and denies homicidal ideation, intent, or plan. Therefore, based on all available evidence including the factors cited above, Carolyn Jacobs does not appear to be at imminent risk for self-harm, does not meet criteria for a 72-hr hold, and therefore remains appropriate for ongoing outpatient level of care. A thorough assessment of risk factors related to suicide and self-harm have been reviewed and are noted above. The patient convincingly denies suicidality on several occasions. Local community resources reviewed and printed for patient to use if needed. There was no deceit detected, and the patient presented in a manner that was believable.     DSM5 Diagnosis:  296.32 Major Depressive Disorder, Recurrent Episode, Moderate With anxious distress and With peripartum onset  Rule out 301.13 Cyclothymic Disorder vs Bipolar Disorder  300.02 (F41.1) Generalized Anxiety Disorder  Rule out Obsessive Compulsive Disorder (OCD)     Medical comorbidities include:   Patient Active Problem List    Diagnosis Date Noted     REILLY (generalized anxiety disorder) 03/14/2017     Priority: Medium     Moderate episode of recurrent major depressive disorder (H) 03/14/2017     Priority: Medium     Supervision of normal pregnancy 09/19/2016     Priority: Medium     Dysthymia 10/20/2015     Priority: Medium     Migraine 07/15/2015     Priority: Medium     Acetabular labrum tear 01/03/2015     Priority: Medium     Postpartum anxiety 10/10/2014     Priority: Medium     CARDIOVASCULAR SCREENING; LDL GOAL LESS THAN 160 09/29/2014     Priority: Medium       Psychosocial & Contextual Factors:  Parent/Child Relationship Difficulties and Relationship Difficulties    Assessment:  Carolyn Jacobs reports increase in anxiety. Medication side effects and alternatives were reviewed. Health promotion  activities recommended and reviewed today.     Patient has started to take Abilify (aripriprazole) in the AM because she was not able to wake up for her daughter. Anxiety has been elevated again, but sadness has been less. Moods are still up and down. Mornings are usually good and is productive. Still has certain rituals to clean at home. At night, patient also struggles with anxiety before bed and is elevated. Irritability is still there. Stress continues at home and struggling with relationship with . Reviewed possible sources of support and times to take a break from parenting.    Discussed possible increase in Zoloft (sertraline) to 200 mg, but patient does not want to increase at this time.  Feels calmer when she brings Klonopin (clonazepam) with her to use if needed.     Will continue to monitor mood closely if we continue with SSRI/SNRI medications. No past trial of SNRI. May benefit from Effexor (venlafaxine). Also will continue to consider reduction in Klonopin (clonazepam) and trial of Inderal (propranolol) or Buspar (buspirone). Discussed last dose increase of Klonopin (clonazepam) while we continue to adjust our other medications. Prescription printed and given to patient today.    Treatment Plan:    Continue Zoloft (sertraline) 150 mg by mouth daily for depression and anxiety.    Continue Abilify (aripriprazole) 5 mg by mouth daily.    Continue Klonopin (clonazepam) 1 mg by mouth up to 3 times by mouth daily for anxiety. This is the last dose increase.     Consider addition of Inderal (propranolol) or Buspar (buspirone) for anxiety.    Continue all other medications as reviewed per electronic medical record today.     All questions addressed. Education and counseling completed regarding risks and benefits of medications and psychotherapy options.    Safety plan was reviewed. To the Emergency Department as needed or call after hours crisis line at [phone] or [phone].     Continue  individual/group therapy as planned with SARAHI Muir, [facility]     Schedule an appointment with me in 4 weeks or sooner as needed. Call [facility] at [phone] to schedule.    Follow up with primary care provider as planned or for acute medical concerns.    Call the psychiatric nurse line with medication questions or concerns at [phone].    My Practice Policy was reviewed and signed: YES     MyChart may be used to communicate with your provider, but this is not intended to be used for emergencies.    Administrative Billing:   Time spent with patient and her 2 children was 30 minutes and greater than 50% of time or 20 minutes was spent in counseling and coordination of care.    Patient Status:  Patient will continue to be seen for ongoing consultation and stabilization.    Signed:   Carmenza Menard, PhD, APRN, CNP   Psychiatry

## 2017-05-10 NOTE — PATIENT INSTRUCTIONS
Treatment Plan:    Continue Zoloft (sertraline) 150 mg by mouth daily for depression and anxiety.    Continue Abilify (aripriprazole) 5 mg by mouth daily.    Continue Klonopin (clonazepam) 1 mg by mouth up to 3 times by mouth daily for anxiety. This is the last dose increase.     Consider addition of Inderal (propranolol) or Buspar (buspirone) for anxiety.    Continue all other medications as reviewed per electronic medical record today.     All questions addressed. Education and counseling completed regarding risks and benefits of medications and psychotherapy options.    Safety plan was reviewed. To the Emergency Department as needed or call after hours crisis line at [phone] or [phone].     Continue individual/group therapy as planned with SARAHI Muir, [facility]     Schedule an appointment with me in 4 weeks or sooner as needed. Call [facility] at [phone] to schedule.    Follow up with primary care provider as planned or for acute medical concerns.    Call the psychiatric nurse line with medication questions or concerns at [phone].    My Practice Policy was reviewed and signed: YES     MyChart may be used to communicate with your provider, but this is not intended to be used for emergencies.

## 2017-05-11 ASSESSMENT — PATIENT HEALTH QUESTIONNAIRE - PHQ9: SUM OF ALL RESPONSES TO PHQ QUESTIONS 1-9: 16

## 2017-05-11 ASSESSMENT — ANXIETY QUESTIONNAIRES: GAD7 TOTAL SCORE: 21

## 2017-05-17 ENCOUNTER — OFFICE VISIT (OUTPATIENT)
Dept: OBGYN | Facility: CLINIC | Age: 27
End: 2017-05-17
Payer: COMMERCIAL

## 2017-05-17 VITALS
SYSTOLIC BLOOD PRESSURE: 106 MMHG | HEART RATE: 72 BPM | OXYGEN SATURATION: 98 % | BODY MASS INDEX: 25.23 KG/M2 | WEIGHT: 147 LBS | DIASTOLIC BLOOD PRESSURE: 64 MMHG

## 2017-05-17 DIAGNOSIS — Z30.09 GENERAL COUNSELING AND ADVICE ON CONTRACEPTIVE MANAGEMENT: Primary | ICD-10-CM

## 2017-05-17 DIAGNOSIS — Z30.42 DEPO-PROVERA CONTRACEPTIVE STATUS: ICD-10-CM

## 2017-05-17 PROCEDURE — 96372 THER/PROPH/DIAG INJ SC/IM: CPT | Performed by: ADVANCED PRACTICE MIDWIFE

## 2017-05-17 PROCEDURE — 99213 OFFICE O/P EST LOW 20 MIN: CPT | Mod: 25 | Performed by: ADVANCED PRACTICE MIDWIFE

## 2017-05-17 RX ORDER — MEDROXYPROGESTERONE ACETATE 150 MG/ML
150 INJECTION, SUSPENSION INTRAMUSCULAR
Qty: 1 ML | Refills: 3 | OUTPATIENT
Start: 2017-05-17 | End: 2017-08-10

## 2017-05-17 NOTE — PROGRESS NOTES
Initial Depo Injection     Is the patient within 1st 5 days of a normal period?   No  Is the patient post delivery ?      No  If yes, is she breastfeeding?  No  If yes breastfeeding, shot given within 6 weeks after delivery?    No.  If no breastfeeding, shot given within 5 days of delivery?  No    BP Readings from Last 1 Encounters:   05/17/17 106/64     Patient's last menstrual period was 05/08/2017.    Date range to return is given to patient for her next dose: 8/2-8/16/17     See Medication Note for administration information    Staff Sig: Johana Rogers MA

## 2017-05-17 NOTE — PROGRESS NOTES
SUBJECTIVE:   Carolyn Jacobs is a 27 year old who presents to the clinic for discussion of birth control methods.   She has used the following methods in the past: Depo Provera  Today she is interested in discussing Nexplanon    After discussing risks and benefits and chance of heavier period patient declines nexplanon and requests depoprovera. Was not aware that she could get a period when counseling was done. She hates having periods and she does not want to have them. Reports she does not want anymore kids and is having some issues with depression and adjusting to having 2 kids. Seen and managed by psychiatrist with both medications and counseling. Previously got pregnant x2 by missing window for depoprovera    Histories reviewed and updated  Past Medical History:   Diagnosis Date     Abnormal Pap smear     HPV & normal      Anemia     not on iron vits, O+ blood type     Anxiety      Panic attacks      Varicosities      Past Surgical History:   Procedure Laterality Date     C INDUCED ABORTN BY DIL/EVAC  2005     wisdom teeth       Social History     Social History     Marital status: Single     Spouse name: N/A     Number of children: 0     Years of education: 11     Occupational History     student      Social History Main Topics     Smoking status: Former Smoker     Packs/day: 0.25     Years: 1.00     Types: Cigarettes     Quit date: 10/19/2014     Smokeless tobacco: Former User      Comment: 5 cigs per day     Alcohol use No     Drug use: No     Sexual activity: Yes     Partners: Male     Birth control/ protection: None     Other Topics Concern     Not on file     Social History Narrative     Family History   Problem Relation Age of Onset     Hypertension Mother      Neurologic Disorder Mother      migraine headache     Depression Sister      Depression Brother        Menstrual History:  Menses every 28 days.  Length of menses: 6 days  Menstrual description: normal    Health maintenance updated:  yes    ROS:    12 point review of systems negative other than symptoms noted below.    EXAM:  /64 (BP Location: Right arm, Patient Position: Chair, Cuff Size: Adult Regular)  Pulse 72  Wt 147 lb (66.7 kg)  LMP 05/08/2017  SpO2 98%  BMI 25.23 kg/m2  Body mass index is 25.23 kg/(m^2).    Constitutional: appears in good health and pleasant      ASSESSMENT/PLAN:    ICD-10-CM    1. General counseling and advice on contraceptive management Z30.09 medroxyPROGESTERone (DEPO-PROVERA) 150 MG/ML injection   2. Depo-Provera contraceptive status Z30.40 medroxyPROGESTERone (DEPO-PROVERA) 150 MG/ML injection     THER/PROPH/DIAG INJ, SC/IM     There are no contraindications to the use of Depo Provera    COUNSELING:  Reviewed risks and benefits of contraceptive use  Verbalizes understanding of increased osteroporosis risk  Discussed proper use of chosen method  Instrucions provided  Use back up method for 7 days    20 minutes was spent face to face with the patient today discussing her history, diagnosis, and follow-up plan as noted above. Over 50% of the visit was spent in counseling and coordination of care.    Total Visit Time: 20 minutes.       JOSE Crow, SOFI

## 2017-05-17 NOTE — MR AVS SNAPSHOT
After Visit Summary   5/17/2017    Carolyn Jacobs    MRN: [id number]           Patient Information     Date Of Birth          1990        Visit Information        Provider Department      5/17/2017 1:30 PM Rachel Owen APRN CNM [facility]        Today's Diagnoses     General counseling and advice on contraceptive management    -  1    Depo-Provera contraceptive status           Follow-ups after your visit        Follow-up notes from your care team     Return in about 3 months (around 8/17/2017).      Your next 10 appointments already scheduled     May 23, 2017  1:30 PM CDT   Return Visit with SARAHI Martínez   [facility]    [address] [facility] [address]  [facility] [address] [phone]              Who to contact     If you have questions or need follow up information about today's clinic visit or your schedule please contact [facility] directly at [phone].  Normal or non-critical lab and imaging results will be communicated to you by [employer], letter or phone within 4 business days after the clinic has received the results. If you do not hear from us within 7 days, please contact the clinic through [employer] or phone. If you have a critical or abnormal lab result, we will notify you by phone as soon as possible.  Submit refill requests through [employer] or call your pharmacy and they will forward the refill request to us. Please allow 3 business days for your refill to be completed.          Additional Information About Your Visit        [employer] Information     [employer] gives you secure access to your electronic health record. If you see a primary care provider, you can also send messages to your care team and make appointments. If you have questions, please call your primary care clinic.  If you do not have a primary care provider, please call [phone] and they will  assist you.        Care EveryWhere ID     This is your Care EveryWhere ID. This could be used by other organizations to access your [place] medical records  [id number]        Your Vitals Were     Pulse Last Period Pulse Oximetry BMI (Body Mass Index)          72 05/08/2017 98% 25.23 kg/m2         Blood Pressure from Last 3 Encounters:   05/17/17 106/64   05/10/17 98/60   04/27/17 102/64    Weight from Last 3 Encounters:   05/17/17 147 lb (66.7 kg)   05/10/17 145 lb (65.8 kg)   04/27/17 152 lb (68.9 kg)              We Performed the Following     THER/PROPH/DIAG INJ, SC/IM          Today's Medication Changes          These changes are accurate as of: 5/17/17  4:52 PM.  If you have any questions, ask your nurse or doctor.               Start taking these medicines.        Dose/Directions    medroxyPROGESTERone 150 MG/ML injection   Commonly known as:  DEPO-PROVERA   Used for:  Depo-Provera contraceptive status, General counseling and advice on contraceptive management   Started by:  Rachel Owen APRN CNDEANGELO        Dose:  150 mg   Inject 1 mL (150 mg) into the muscle every 3 months   Quantity:  1 mL   Refills:  3            Where to get your medicines      Some of these will need a paper prescription and others can be bought over the counter.  Ask your nurse if you have questions.     You don't need a prescription for these medications     medroxyPROGESTERone 150 MG/ML injection                Primary Care Provider Office Phone # Fax #    JOSE Bonilla [facility] [phone]       [place] [address] [place] [address]        Thank you!     Thank you for choosing [facility]  for your care. Our goal is always to provide you with excellent care. Hearing back from our patients is one way we can continue to improve our services. Please take a few minutes to complete the written survey that you may receive in the mail after your visit with us.  Thank you!             Your Updated Medication List - Protect others around you: Learn how to safely use, store and throw away your medicines at www.disposemymeds.org.          This list is accurate as of: 5/17/17  4:52 PM.  Always use your most recent med list.                   Brand Name Dispense Instructions for use    ARIPiprazole 5 MG tablet    ABILIFY    30 tablet    Take 1 tablet (5 mg) by mouth At Bedtime Increased dose.       clonazePAM 1 MG tablet    klonoPIN    90 tablet    Take 1 tablet (1 mg) by mouth 3 times daily as needed for anxiety Increased dose.       ibuprofen 800 MG tablet    ADVIL/MOTRIN    90 tablet    Take 1 tablet (800 mg) by mouth every 8 hours as needed for moderate pain       medroxyPROGESTERone 150 MG/ML injection    DEPO-PROVERA    1 mL    Inject 1 mL (150 mg) into the muscle every 3 months       sertraline 100 MG tablet    ZOLOFT    45 tablet    Take 1.5 tablets (150 mg) by mouth daily Increased dose.

## 2017-05-25 ENCOUNTER — TELEPHONE (OUTPATIENT)
Dept: PSYCHIATRY | Facility: CLINIC | Age: 27
End: 2017-05-25

## 2017-05-25 DIAGNOSIS — F41.1 GENERALIZED ANXIETY DISORDER: Primary | ICD-10-CM

## 2017-05-25 NOTE — TELEPHONE ENCOUNTER
RN reviewed chart.   Will route to provider to determine if medication change is appropriate before her appointment in July.     From last office visit with Carmenza Menard, PhD, APRN, CNP 5/10/17  Past medication trials include but are not limited to:   Klonopin (clonazepam) 2014 and just restarted about 7 weeks ago  Zoloft (sertraline)  Celexa (citalopram) 2014  Lexapro (escitalopram) 2014  Prozac (fluoxetine) 2015  Wellbutrin (buproprion) ?  Vistaril/Atarax (hydroxyzine)  Adderall (amphetamine salts)   Ritalin (methylphenidate)   Ativan (lorazepam) 2015  Chantix (varenicline)      Assessment:  Carolyn Jacobs reports increase in anxiety. Medication side effects and alternatives were reviewed. Health promotion activities recommended and reviewed today.      Patient has started to take Abilify (aripriprazole) in the AM because she was not able to wake up for her daughter. Anxiety has been elevated again, but sadness has been less. Moods are still up and down. Mornings are usually good and is productive. Still has certain rituals to clean at home. At night, patient also struggles with anxiety before bed and is elevated. Irritability is still there. Stress continues at home and struggling with relationship with . Reviewed possible sources of support and times to take a break from parenting.     Discussed possible increase in Zoloft (sertraline) to 200 mg, but patient does not want to increase at this time.  Feels calmer when she brings Klonopin (clonazepam) with her to use if needed.      Will continue to monitor mood closely if we continue with SSRI/SNRI medications. No past trial of SNRI. May benefit from Effexor (venlafaxine). Also will continue to consider reduction in Klonopin (clonazepam) and trial of Inderal (propranolol) or Buspar (buspirone). Discussed last dose increase of Klonopin (clonazepam) while we continue to adjust our other medications. Prescription printed and given to patient  today.     Treatment Plan:    Continue Zoloft (sertraline) 150 mg by mouth daily for depression and anxiety.    Continue Abilify (aripriprazole) 5 mg by mouth daily.    Continue Klonopin (clonazepam) 1 mg by mouth up to 3 times by mouth daily for anxiety. This is the last dose increase.     Consider addition of Inderal (propranolol) or Buspar (buspirone) for anxiety.    Continue all other medications as reviewed per electronic medical record today.     All questions addressed. Education and counseling completed regarding risks and benefits of medications and psychotherapy options.    Safety plan was reviewed. To the Emergency Department as needed or call after hours crisis line at [phone] or [phone].     Continue individual/group therapy as planned with SARAHI Muir, [facility]     Schedule an appointment with me in 4 weeks or sooner as needed. Call [facility] at [phone] to schedule.    Follow up with primary care provider as planned or for acute medical concerns.    Call the psychiatric nurse line with medication questions or concerns at [phone].    My Practice Policy was reviewed and signed: YES     MyChart may be used to communicate with your provider, but this is not intended to be used for emergencies.     Administrative Billing:   Time spent with patient and her 2 children was 30 minutes and greater than 50% of time or 20 minutes was spent in counseling and coordination of care.     Patient Status:  Patient will continue to be seen for ongoing consultation and stabilization.     Signed:   Carmenza Menard, PhD, APRN, CNP   Psychiatry

## 2017-05-25 NOTE — TELEPHONE ENCOUNTER
Reason for call:  Other   Patient called regarding (reason for call): prescription  Additional comments: client is not sure that she likes Abilify. Scheduled return appt on 7/5/17 and on the wait list. She spoke w/her sister her is taking Lyrica from similar symptoms and was wondering if she could try it.     Phone number to reach patient:  Home number on file [phone] (home)    Best Time:  anytime    Can we leave a detailed message on this number?  YES

## 2017-05-26 RX ORDER — GABAPENTIN 300 MG/1
300 CAPSULE ORAL AT BEDTIME
Qty: 30 CAPSULE | Refills: 1 | Status: SHIPPED | OUTPATIENT
Start: 2017-05-26 | End: 2017-06-15

## 2017-05-26 NOTE — TELEPHONE ENCOUNTER
Reason for call:  Other   Patient called regarding (reason for call): waiting on return call  Additional comments: She is leaving for [facility] around 6pm and is wanting a call back about changing medications before she leaves. Please call.    Phone number to reach patient:  Home number on file [phone] (home)    Best Time:  anytime    Can we leave a detailed message on this number?  YES

## 2017-05-26 NOTE — TELEPHONE ENCOUNTER
We never discussed this medication.  I rarely use it off-label for anxiety because no one would pay for it.  She has not been trialed on Neurontin (gabapentin) and they would likely prefer that trial first.

## 2017-05-26 NOTE — TELEPHONE ENCOUNTER
RN had spoken with Pt after this second telephone encounter. Pt wants to wait to start a new medication as wants to research gabapentin before she takes it.

## 2017-05-26 NOTE — TELEPHONE ENCOUNTER
Patient called back and said she would like to try Gabapentin.  She is leaving [facility] and asked that this be high priority.

## 2017-06-15 ENCOUNTER — OFFICE VISIT (OUTPATIENT)
Dept: PSYCHIATRY | Facility: CLINIC | Age: 27
End: 2017-06-15
Payer: COMMERCIAL

## 2017-06-15 VITALS
BODY MASS INDEX: 24.2 KG/M2 | SYSTOLIC BLOOD PRESSURE: 102 MMHG | HEART RATE: 87 BPM | WEIGHT: 141 LBS | TEMPERATURE: 97.4 F | DIASTOLIC BLOOD PRESSURE: 60 MMHG | OXYGEN SATURATION: 100 %

## 2017-06-15 DIAGNOSIS — F41.8 POSTPARTUM ANXIETY: ICD-10-CM

## 2017-06-15 DIAGNOSIS — F41.1 GAD (GENERALIZED ANXIETY DISORDER): Primary | ICD-10-CM

## 2017-06-15 PROCEDURE — 99214 OFFICE O/P EST MOD 30 MIN: CPT | Performed by: NURSE PRACTITIONER

## 2017-06-15 RX ORDER — CLONAZEPAM 1 MG/1
1 TABLET ORAL 3 TIMES DAILY PRN
Qty: 90 TABLET | Refills: 1 | Status: SHIPPED | OUTPATIENT
Start: 2017-06-29 | End: 2018-01-24

## 2017-06-15 RX ORDER — BUSPIRONE HYDROCHLORIDE 10 MG/1
TABLET ORAL
Qty: 60 TABLET | Refills: 1 | Status: SHIPPED | OUTPATIENT
Start: 2017-06-15 | End: 2017-07-13

## 2017-06-15 ASSESSMENT — PATIENT HEALTH QUESTIONNAIRE - PHQ9: 5. POOR APPETITE OR OVEREATING: NEARLY EVERY DAY

## 2017-06-15 ASSESSMENT — ANXIETY QUESTIONNAIRES
GAD7 TOTAL SCORE: 21
7. FEELING AFRAID AS IF SOMETHING AWFUL MIGHT HAPPEN: NEARLY EVERY DAY
5. BEING SO RESTLESS THAT IT IS HARD TO SIT STILL: NEARLY EVERY DAY
2. NOT BEING ABLE TO STOP OR CONTROL WORRYING: NEARLY EVERY DAY
3. WORRYING TOO MUCH ABOUT DIFFERENT THINGS: NEARLY EVERY DAY
IF YOU CHECKED OFF ANY PROBLEMS ON THIS QUESTIONNAIRE, HOW DIFFICULT HAVE THESE PROBLEMS MADE IT FOR YOU TO DO YOUR WORK, TAKE CARE OF THINGS AT HOME, OR GET ALONG WITH OTHER PEOPLE: VERY DIFFICULT
6. BECOMING EASILY ANNOYED OR IRRITABLE: NEARLY EVERY DAY
1. FEELING NERVOUS, ANXIOUS, OR ON EDGE: NEARLY EVERY DAY

## 2017-06-15 NOTE — PATIENT INSTRUCTIONS
Treatment Plan:    Discontinue Zoloft (sertraline) and Abilify (aripriprazole) and Neurontin (gabapentin).     Klonopin (clonazepam) 1 mg up to 3 times per day for anxiety.     Start Buspar (buspirone) 5 mg two times daily for one week, then increase to 10 mg two times daily. For anxiety. Target dose 30 - 60 mg daily.     Continue all other medications as reviewed per electronic medical record today.     All questions addressed. Education and counseling completed regarding risks and benefits of medications and psychotherapy options.    Safety plan was reviewed. To the Emergency Department as needed or call after hours crisis line at [phone] or [phone].     Continue individual/group therapy as planned with SARAHI Muir, [facility].     Schedule an appointment with me in 3-8 weeks or sooner as needed. Call [facility] at [phone] to schedule.    Follow up with primary care provider as planned or for acute medical concerns.    Call the psychiatric nurse line with medication questions or concerns at [phone].    My Practice Policy was reviewed and signed: YES     MyChart may be used to communicate with your provider, but this is not intended to be used for emergencies.

## 2017-06-15 NOTE — Clinical Note
Psychiatry update. Significant psychosocial distress continues. Is hesitant to try other medications instead of benzodiazepines.

## 2017-06-15 NOTE — MR AVS SNAPSHOT
After Visit Summary   6/15/2017    Carolyn Jacobs    MRN: [id number]           Patient Information     Date Of Birth          1990        Visit Information        Provider Department      6/15/2017 4:15 PM Carmenza Menard NP [facility]        Today's Diagnoses     REILLY (generalized anxiety disorder)    -  1    Postpartum anxiety          Care Instructions    Treatment Plan:    Discontinue Zoloft (sertraline) and Abilify (aripriprazole) and Neurontin (gabapentin).     Klonopin (clonazepam) 1 mg up to 3 times per day for anxiety.     Start Buspar (buspirone) 5 mg two times daily for one week, then increase to 10 mg two times daily. For anxiety. Target dose 30 - 60 mg daily.     Continue all other medications as reviewed per electronic medical record today.     All questions addressed. Education and counseling completed regarding risks and benefits of medications and psychotherapy options.    Safety plan was reviewed. To the Emergency Department as needed or call after hours crisis line at [phone] or [phone].     Continue individual/group therapy as planned with SARAHI Muir, [facility].     Schedule an appointment with me in 3-8 weeks or sooner as needed. Call [facility] at [phone] to schedule.    Follow up with primary care provider as planned or for acute medical concerns.    Call the psychiatric nurse line with medication questions or concerns at [phone].    My Practice Policy was reviewed and signed: YES     MyChart may be used to communicate with your provider, but this is not intended to be used for emergencies.          Follow-ups after your visit        Follow-up notes from your care team     Return in about 4 weeks (around 7/13/2017).      Your next 10 appointments already scheduled     Jun 21, 2017  3:30 PM CDT   Return Visit with SARAHI Martínez   [facility]    [address]  [facility] [id number] [phone]              Who to contact     If you have questions or need follow up information about today's clinic visit or your schedule please contact [facility] directly at [phone].  Normal or non-critical lab and imaging results will be communicated to you by MyChart, letter or phone within 4 business days after the clinic has received the results. If you do not hear from us within 7 days, please contact the clinic through [employer] or phone. If you have a critical or abnormal lab result, we will notify you by phone as soon as possible.  Submit refill requests through [employer] or call your pharmacy and they will forward the refill request to us. Please allow 3 business days for your refill to be completed.          Additional Information About Your Visit        MyChart Information     [employer] gives you secure access to your electronic health record. If you see a primary care provider, you can also send messages to your care team and make appointments. If you have questions, please call your primary care clinic.  If you do not have a primary care provider, please call [phone] and they will assist you.        Care EveryWhere ID     This is your Care EveryWhere ID. This could be used by other organizations to access your [place] medical records  [id number]        Your Vitals Were     Pulse Temperature Last Period Pulse Oximetry BMI (Body Mass Index)       87 97.4  F (36.3  C) (Oral) 06/15/2017 (Approximate) 100% 24.2 kg/m2        Blood Pressure from Last 3 Encounters:   06/15/17 102/60   05/17/17 106/64   05/10/17 98/60    Weight from Last 3 Encounters:   06/15/17 141 lb (64 kg)   05/17/17 147 lb (66.7 kg)   05/10/17 145 lb (65.8 kg)              Today, you had the following     No orders found for display         Today's Medication Changes          These changes are accurate as of: 6/15/17  4:55 PM.  If you have any questions, ask your  nurse or doctor.               Start taking these medicines.        Dose/Directions    busPIRone 10 MG tablet   Commonly known as:  BUSPAR   Used for:  REILLY (generalized anxiety disorder)   Started by:  Carmenza Menard NP        Start 1/2 tablet two times per day for one week, then increase to 1 tablet two times per day. For anxiety.   Quantity:  60 tablet   Refills:  1            Where to get your medicines      These medications were sent to [place], [place] - [facility] [address]  [address] [facility] [address]     Phone:  [phone]     busPIRone 10 MG tablet         Some of these will need a paper prescription and others can be bought over the counter.  Ask your nurse if you have questions.     Bring a paper prescription for each of these medications     clonazePAM 1 MG tablet                Primary Care Provider Office Phone # Fax #    JOSE Bonilla [facility] [phone]       [facility] [address]  [facility] [address]        Thank you!     Thank you for choosing [facility]  for your care. Our goal is always to provide you with excellent care. Hearing back from our patients is one way we can continue to improve our services. Please take a few minutes to complete the written survey that you may receive in the mail after your visit with us. Thank you!             Your Updated Medication List - Protect others around you: Learn how to safely use, store and throw away your medicines at www.disposemymeds.org.          This list is accurate as of: 6/15/17  4:55 PM.  Always use your most recent med list.                   Brand Name Dispense Instructions for use    busPIRone 10 MG tablet    BUSPAR    60 tablet    Start 1/2 tablet two times per day for one week, then increase to 1 tablet two times per day. For anxiety.       clonazePAM 1 MG tablet   Start taking on:  6/29/2017    klonoPIN    90 tablet    Take 1 tablet (1  mg) by mouth 3 times daily as needed for anxiety Increased dose.       ibuprofen 800 MG tablet    ADVIL/MOTRIN    90 tablet    Take 1 tablet (800 mg) by mouth every 8 hours as needed for moderate pain       medroxyPROGESTERone 150 MG/ML injection    DEPO-PROVERA    1 mL    Inject 1 mL (150 mg) into the muscle every 3 months

## 2017-06-15 NOTE — NURSING NOTE
"Chief Complaint   Patient presents with     RECHECK     life in shambles the past week per pt, anxiety level increased, pt does not like gabapentin pt feels fuzzy, stopped zoloft 2 weeks ago pt felt dull       Initial /60 (BP Location: Right arm, Patient Position: Chair, Cuff Size: Adult Regular)  Pulse 87  Temp 97.4  F (36.3  C) (Oral)  Wt 141 lb (64 kg)  LMP 06/15/2017 (Approximate)  SpO2 100%  BMI 24.2 kg/m2 Estimated body mass index is 24.2 kg/(m^2) as calculated from the following:    Height as of 5/10/17: 5' 4\" (1.626 m).    Weight as of this encounter: 141 lb (64 kg).  Medication Reconciliation: complete    "
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
## 2017-06-16 ENCOUNTER — TELEPHONE (OUTPATIENT)
Dept: PSYCHIATRY | Facility: CLINIC | Age: 27
End: 2017-06-16

## 2017-06-16 DIAGNOSIS — F33.1 MAJOR DEPRESSIVE DISORDER, RECURRENT EPISODE, MODERATE (H): Primary | ICD-10-CM

## 2017-06-16 ASSESSMENT — ANXIETY QUESTIONNAIRES: GAD7 TOTAL SCORE: 21

## 2017-06-16 ASSESSMENT — PATIENT HEALTH QUESTIONNAIRE - PHQ9: SUM OF ALL RESPONSES TO PHQ QUESTIONS 1-9: 27

## 2017-06-21 ENCOUNTER — OFFICE VISIT (OUTPATIENT)
Dept: BEHAVIORAL HEALTH | Facility: CLINIC | Age: 27
End: 2017-06-21
Payer: COMMERCIAL

## 2017-06-21 DIAGNOSIS — F41.1 GAD (GENERALIZED ANXIETY DISORDER): Primary | ICD-10-CM

## 2017-06-21 DIAGNOSIS — F33.1 MODERATE EPISODE OF RECURRENT MAJOR DEPRESSIVE DISORDER (H): ICD-10-CM

## 2017-06-21 PROCEDURE — 90785 PSYTX COMPLEX INTERACTIVE: CPT | Performed by: MARRIAGE & FAMILY THERAPIST

## 2017-06-21 PROCEDURE — 90837 PSYTX W PT 60 MINUTES: CPT | Performed by: MARRIAGE & FAMILY THERAPIST
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
## 2017-06-21 NOTE — MR AVS SNAPSHOT
After Visit Summary   6/21/2017    Carolyn Jacobs    MRN: [id number]           Patient Information     Date Of Birth          1990        Visit Information        Provider Department      6/21/2017 3:30 PM Magaly Redd LMFT [facility]        Today's Diagnoses     REILLY (generalized anxiety disorder)    -  1    Moderate episode of recurrent major depressive disorder (H)           Follow-ups after your visit        Your next 10 appointments already scheduled     Jun 27, 2017  2:00 PM CDT   [facility]   [facility]    [address]  [facility] [id number] [phone]            Jul 13, 2017  8:45 AM CDT   [facility] Visit with Carmenza Menard NP   [facility]    [address]  [address]  [facility] [id number] [phone]              Who to contact     If you have questions or need follow up information about today's clinic visit or your schedule please contact [facility] directly at [phone].  Normal or non-critical lab and imaging results will be communicated to you by MyChart, letter or phone within 4 business days after the clinic has received the results. If you do not hear from us within 7 days, please contact the clinic through Kokohart or phone. If you have a critical or abnormal lab result, we will notify you by phone as soon as possible.  Submit refill requests through MediaScrape or call your pharmacy and they will forward the refill request to us. Please allow 3 business days for your refill to be completed.          Additional Information About Your Visit        MyChart Information     MediaScrape gives you secure access to your electronic health record. If you see a primary care provider, you can also send messages to your care team and make appointments. If you have questions, please call your  primary care clinic.  If you do not have a primary care provider, please call [phone] and they will assist you.        Care EveryWhere ID     This is your Care EveryWhere ID. This could be used by other organizations to access your [place] medical records  [id number]        Your Vitals Were     Last Period                   06/15/2017 (Approximate)            Blood Pressure from Last 3 Encounters:   06/15/17 102/60   05/17/17 106/64   05/10/17 98/60    Weight from Last 3 Encounters:   06/15/17 64 kg (141 lb)   05/17/17 66.7 kg (147 lb)   05/10/17 65.8 kg (145 lb)              We Performed the Following     C PSYCHOTHERAPY COMPLEX INTERACTIVE        Primary Care Provider Office Phone # Fax #    JOSE Bonilla [phone]       [facility] [address] [facility] [address]        Equal Access to Services     HANSEL VERDUGO : Hadii aad ku hadasho Soomaali, waaxda luqadaha, qaybta kaalmada adeegyada, waxay idiin hayaan adearun chester . So [facility] [phone].    ATENCIÓN: Si habla español, tiene a redding disposición servicios gratuitos de asistencia lingüística. Bradyjuan jose al [phone].    We comply with applicable federal civil rights laws and [place] laws. We do not discriminate on the basis of race, color, national origin, age, disability sex, sexual orientation or gender identity.            Thank you!     Thank you for choosing [facility]  for your care. Our goal is always to provide you with excellent care. Hearing back from our patients is one way we can continue to improve our services. Please take a few minutes to complete the written survey that you may receive in the mail after your visit with us. Thank you!             Your Updated Medication List - Protect others around you: Learn how to safely use, store and throw away your medicines at www.disposemymeds.org.          This list is accurate as of: 6/21/17  5:08 PM.  Always use your most  recent med list.                   Brand Name Dispense Instructions for use Diagnosis    busPIRone 10 MG tablet    BUSPAR    60 tablet    Start 1/2 tablet two times per day for one week, then increase to 1 tablet two times per day. For anxiety.    REILLY (generalized anxiety disorder)       clonazePAM 1 MG tablet   Start taking on:  2017    klonoPIN    90 tablet    Take 1 tablet (1 mg) by mouth 3 times daily as needed for anxiety Increased dose.    Postpartum anxiety       ibuprofen 800 MG tablet    ADVIL/MOTRIN    90 tablet    Take 1 tablet (800 mg) by mouth every 8 hours as needed for moderate pain     (spontaneous vaginal delivery)       medroxyPROGESTERone 150 MG/ML injection    DEPO-PROVERA    1 mL    Inject 1 mL (150 mg) into the muscle every 3 months    Depo-Provera contraceptive status, General counseling and advice on contraceptive management

## 2017-06-22 RX ORDER — VENLAFAXINE HYDROCHLORIDE 37.5 MG/1
CAPSULE, EXTENDED RELEASE ORAL
Qty: 40 CAPSULE | Refills: 0 | Status: SHIPPED | OUTPATIENT
Start: 2017-06-22 | End: 2017-07-13

## 2017-06-22 NOTE — TELEPHONE ENCOUNTER
She wants more benzodiazepines and that will not happen.  She can try the Effexor (venlafaxine) SNRI start 37.5 mg XR daily for one week, then increase to 75 mg daily.

## 2017-06-22 NOTE — TELEPHONE ENCOUNTER
Left VM for Pt asking for pharmacy information if she would like to start Effexor. Will await call back from Pt.

## 2017-06-22 NOTE — TELEPHONE ENCOUNTER
Per Carmenza Menard, PhD, APRN, CNP, pt can try Effexor 37.5 mg XR daily for one week, then increase to 75 mg Daily.     Will send this to Pt's pharmcy, and she can follow up with Carmenza Menard, PhD, APRN, CNP at her next appointment. 7/13/17

## 2017-06-22 NOTE — TELEPHONE ENCOUNTER
Reason for call:  Other   Patient called regarding (reason for call): prescription  Additional comments: Patient just called to state she is willing to try Effexor. Please send to [facility] in [place].     Phone number to reach patient:  Home number on file [phone] (home)    Best Time:  anytime    Can we leave a detailed message on this number?  YES

## 2017-06-27 ENCOUNTER — OFFICE VISIT (OUTPATIENT)
Dept: BEHAVIORAL HEALTH | Facility: CLINIC | Age: 27
End: 2017-06-27
Payer: COMMERCIAL

## 2017-06-27 DIAGNOSIS — F33.1 MODERATE EPISODE OF RECURRENT MAJOR DEPRESSIVE DISORDER (H): ICD-10-CM

## 2017-06-27 DIAGNOSIS — F41.1 GAD (GENERALIZED ANXIETY DISORDER): Primary | ICD-10-CM

## 2017-06-27 PROCEDURE — 90832 PSYTX W PT 30 MINUTES: CPT | Performed by: MARRIAGE & FAMILY THERAPIST

## 2017-06-27 NOTE — PROGRESS NOTES
[facility]  June 27, 2017      Behavioral Health Clinician Progress Note    Patient Name: Carolyn Jacobs           Service Type: Individual      Service Location:   Face to Face in Clinic     Session Start Time: 2;00 Session End Time: 2:30      Session Length: 16 - 37      Attendees: Client and children  Visit Activities (Refresh list every visit): [facility] Only    Diagnostic Assessment Date: 3/14/17  Treatment Plan Review Date: 3/22/17  See Flowsheets for today's PHQ-9 and REILLY-7 results  Previous PHQ-9:   PHQ-9 SCORE 5/2/2017 5/10/2017 6/15/2017   Total Score - - -   Total Score 16 16 27     Previous REILLY-7:   REILLY-7 SCORE 5/2/2017 5/10/2017 6/15/2017   Total Score - - -   Total Score 21 21 21       SARAI LEVEL:  No flowsheet data found.    DATA  Extended Session (60+ minutes): No  Interactive Complexity: Yes, visit entailed Interactive Complexity evidenced by:  -The need to manage maladaptive communication (related to, e.g., high anxiety, high reactivity, repeated questions, or disagreement) among participants that complicates delivery of care  Crisis: No    Treatment Objective(s) Addressed in This Session:  Target Behavior(s): diet/weight loss    Depressed Mood: Increase interest, engagement, and pleasure in doing things  Decrease frequency and intensity of feeling down, depressed, hopeless  Improve quantity and quality of night time sleep / decrease daytime naps  Feel less tired and more energy during the day   Improve diet, appetite, mindful eating, and / or meal planning  Identify negative self-talk and behaviors: challenge core beliefs, myths, and actions  Improve concentration, focus, and mindfulness in daily activities   Feel less fidgety, restless or slow in daily activities / interpersonal interactions  Anxiety: will experience a reduction in anxiety, will develop more effective coping skills to manage anxiety symptoms, will develop healthy cognitive patterns and beliefs and will  increase ability to function adaptively    Current Stressors / Issues:  Pt states things have been smoother since she was here last. States she feels a since of disconnect and uncertainty. Feels like it is not a good time to leave. Processed with her allowing each of them to do therapy and then couples and how the disconnect is a normal feeling. Denies SI.       Progress on Treatment Objective(s) / Homework:  Minimal progress - CONTEMPLATION (Considering change and yet undecided); Intervened by assessing the negative and positive thinking (ambivalence) about behavior change    Motivational Interviewing    MI Intervention: Co-Developed Goal: reduce her anxiety and Expressed Empathy/Understanding     Change Talk Expressed by the Patient: Desire to change    Provider Response to Change Talk: E - Evoked more info from patient about behavior change      Situation        Automatic Thoughts  Cognitive Distortions      Feelings        Behavior        Questioning Thoughts              Care Plan review completed: Yes    Medication Review:  No changes to current psychiatric medication(s)    Medication Compliance:  NA    Changes in Health Issues:   None reported    Chemical Use Review:   Substance Use: Chemical use reviewed, no active concerns identified      Tobacco Use: No current tobacco use.      Assessment: Current Emotional / Mental Status (status of significant symptoms):  Risk status (Self / Other harm or suicidal ideation)  Patient denies a history of suicidal ideation, suicide attempts, self-injurious behavior, homicidal ideation, homicidal behavior and and other safety concerns  Patient denies current fears or concerns for personal safety.  Patient denies current or recent suicidal ideation or behaviors.  Patient denies current or recent homicidal ideation or behaviors.  Patient denies current or recent self injurious behavior or ideation.  Patient denies other safety concerns.  A safety and risk management plan has  not been developed at this time, however patient was encouraged to call [place] [phone] should there be a change in any of these risk factors.    Appearance:   Appropriate   Eye Contact:   Good   Psychomotor Behavior: Normal   Attitude:   Cooperative   Orientation:   All  Speech   Rate / Production: Normal    Volume:  Normal   Mood:    Normal  Affect:    Appropriate  Flat   Thought Content:  Clear   Thought Form:  Coherent  Logical   Insight:    Good     Diagnoses:  296.32 Major Depressive Disorder, Recurrent Episode, Moderate _ and With anxious distress  300.02 (F41.1) Generalized Anxiety Disorder  Collateral Reports Completed:  Not Applicable    Plan: (Homework, other):  Patient was given information about behavioral services and encouraged to schedule a follow up appointment with the clinic [facility] in 2 weeks.  She was also given information about mental health symptoms and treatment options  and Cognitive Behavioral Therapy skills to practice when experiencing anxiety and depression.  CD Recommendations: No indications of CD issues.  SARAHI Muir, [facility]      ______________________________________________________________________    Integrated Primary Care Behavioral Health Treatment Plan    Patient's Name: Carolyn Jacobs  YOB: 1990    Date: 3/22/17    DSM-V Diagnoses: 296.32 Major Depressive Disorder, Recurrent Episode, Moderate _ and With anxious distress or 300.02 (F41.1) Generalized Anxiety Disorder  Psychosocial / Contextual Factors: money, family  WHODAS: 25    Referral / Collaboration:  The following referral(s) will be initiated: will see Carmenza on 4/6/16.    Anticipated number of session or this episode of care: 6-8      MeasurableTreatment Goal(s) related to diagnosis / functional impairment(s)  Goal 1: Patient will increase her coping skills to reduce her anxiety    I will know I've met my goal when less anxious.      Objective #A (Patient Action)    Patient will use  worry time   each day for 15 minutes of scheduled worry and then defer obsessive or anxious thinking until the next structured worry time.  Status: New - Date: 6/27/17     Intervention(s)  [facility] will teach the client how to complete a 4-part pros and cons. About her negtive talk aptterns to help her during her worry time.        Patient has reviewed and agreed to the above plan.      Magaly Redd, LMFT  June 27, 2017

## 2017-07-13 ENCOUNTER — OFFICE VISIT (OUTPATIENT)
Dept: PSYCHIATRY | Facility: CLINIC | Age: 27
End: 2017-07-13
Payer: COMMERCIAL

## 2017-07-13 VITALS
WEIGHT: 146 LBS | HEART RATE: 69 BPM | OXYGEN SATURATION: 100 % | DIASTOLIC BLOOD PRESSURE: 60 MMHG | TEMPERATURE: 98.2 F | SYSTOLIC BLOOD PRESSURE: 110 MMHG | BODY MASS INDEX: 25.06 KG/M2

## 2017-07-13 DIAGNOSIS — F33.2 SEVERE EPISODE OF RECURRENT MAJOR DEPRESSIVE DISORDER, WITHOUT PSYCHOTIC FEATURES (H): ICD-10-CM

## 2017-07-13 DIAGNOSIS — F41.1 GAD (GENERALIZED ANXIETY DISORDER): Primary | ICD-10-CM

## 2017-07-13 PROCEDURE — 99214 OFFICE O/P EST MOD 30 MIN: CPT | Performed by: NURSE PRACTITIONER

## 2017-07-13 ASSESSMENT — ANXIETY QUESTIONNAIRES
6. BECOMING EASILY ANNOYED OR IRRITABLE: NEARLY EVERY DAY
2. NOT BEING ABLE TO STOP OR CONTROL WORRYING: NEARLY EVERY DAY
3. WORRYING TOO MUCH ABOUT DIFFERENT THINGS: NEARLY EVERY DAY
IF YOU CHECKED OFF ANY PROBLEMS ON THIS QUESTIONNAIRE, HOW DIFFICULT HAVE THESE PROBLEMS MADE IT FOR YOU TO DO YOUR WORK, TAKE CARE OF THINGS AT HOME, OR GET ALONG WITH OTHER PEOPLE: VERY DIFFICULT
GAD7 TOTAL SCORE: 21
1. FEELING NERVOUS, ANXIOUS, OR ON EDGE: NEARLY EVERY DAY
7. FEELING AFRAID AS IF SOMETHING AWFUL MIGHT HAPPEN: NEARLY EVERY DAY
5. BEING SO RESTLESS THAT IT IS HARD TO SIT STILL: NEARLY EVERY DAY

## 2017-07-13 ASSESSMENT — PATIENT HEALTH QUESTIONNAIRE - PHQ9: 5. POOR APPETITE OR OVEREATING: NEARLY EVERY DAY

## 2017-07-13 NOTE — NURSING NOTE
"Chief Complaint   Patient presents with     RECHECK     pt not doing well with medications \"cannot function\" per pt, never took buspar, did not give effexor a chance \"messes with my head\"        Initial /60 (BP Location: Right arm, Patient Position: Chair, Cuff Size: Adult Regular)  Pulse 69  Temp 98.2  F (36.8  C) (Oral)  Wt 146 lb (66.2 kg)  LMP 07/13/2017  SpO2 100%  BMI 25.06 kg/m2 Estimated body mass index is 25.06 kg/(m^2) as calculated from the following:    Height as of 5/10/17: 5' 4\" (1.626 m).    Weight as of this encounter: 146 lb (66.2 kg).  Medication Reconciliation: complete    "

## 2017-07-13 NOTE — Clinical Note
Psychiatry update. Last visit. Referred to community psychiatry and psychology through [facility].

## 2017-07-13 NOTE — MR AVS SNAPSHOT
After Visit Summary   7/13/2017    Carolyn Jacobs    MRN: [id number]           Patient Information     Date Of Birth          1990        Visit Information        Provider Department      7/13/2017 8:45 AM Carmenza Menard NP [facility]        Today's Diagnoses     REILLY (generalized anxiety disorder)    -  1      Care Instructions    Treatment Plan:    Continue Klonopin (clonazepam) 1 mg up to 3 times per day for anxiety.     Continue all other medications as reviewed per electronic medical record today.     All questions addressed. Education and counseling completed regarding risks and benefits of medications and psychotherapy options.    Safety plan was reviewed. To the Emergency Department as needed or call after hours crisis line at [phone] or [phone].     Continue individual/group therapy as planned with SARAHI Miur, [facility].     Schedule an appointment with me in 6-8 weeks or sooner as needed.     Follow up with primary care provider as planned or for acute medical concerns.    Call the psychiatric nurse line with medication questions or concerns at [phone].    My Practice Policy was reviewed and signed: YES     Epivioshart may be used to communicate with your provider, but this is not intended to be used for emergencies.          Follow-ups after your visit        Additional Services     MENTAL HEALTH REFERRAL       Your provider has referred you to: Behavioral Healthcare Providers Intake Scheduling [phone]  [email or web address]    Psychologist and Psychiatry Community Referral for long term care.    All scheduling is subject to the client's specific insurance plan & benefits, provider/location availability, and provider clinical specialities.  Please arrive 15 minutes early for your first appointment and bring your completed paperwork.    Please be aware that coverage of these services is subject to the terms and limitations of your  health insurance plan.  Call member services at your health plan with any benefit or coverage questions.                  Follow-up notes from your care team     Return in about 6 weeks (around 8/24/2017).      Your next 10 appointments already scheduled     Jul 17, 2017 10:00 AM CDT   Return Visit with SARAHI Martínez   [facility] ([facility])    [address]  [place] [address] [phone]              Who to contact     If you have questions or need follow up information about today's clinic visit or your schedule please contact [facility] directly at [phone].  Normal or non-critical lab and imaging results will be communicated to you by MyChart, letter or phone within 4 business days after the clinic has received the results. If you do not hear from us within 7 days, please contact the clinic through NicOxhart or phone. If you have a critical or abnormal lab result, we will notify you by phone as soon as possible.  Submit refill requests through Learncafe or call your pharmacy and they will forward the refill request to us. Please allow 3 business days for your refill to be completed.          Additional Information About Your Visit        MyChart Information     Learncafe gives you secure access to your electronic health record. If you see a primary care provider, you can also send messages to your care team and make appointments. If you have questions, please call your primary care clinic.  If you do not have a primary care provider, please call [phone] and they will assist you.        Care EveryWhere ID     This is your Care EveryWhere ID. This could be used by other organizations to access your [place] medical records  [id number]        Your Vitals Were     Pulse Temperature Last Period Pulse Oximetry BMI (Body Mass Index)       69 98.2  F (36.8  C) (Oral) 07/13/2017 100% 25.06 kg/m2        Blood Pressure from Last 3  Encounters:   17 110/60   06/15/17 102/60   17 106/64    Weight from Last 3 Encounters:   17 146 lb (66.2 kg)   06/15/17 141 lb (64 kg)   17 147 lb (66.7 kg)              We Performed the Following     MENTAL HEALTH REFERRAL        Primary Care Provider Office Phone # Fax #    Arleen Temple Quyen, APRN [phone]       [facility] [address] [facility] [address]        Equal Access to Services     [facility]: Hadii aad ku hadasho Soomaali, waaxda luqadaha, qaybta kaalmada adeegyada, waxay yanet haywinston chester . So [facility] [phone].    ATENCIÓN: Si habla español, tiene a redding disposición servicios gratuitos de asistencia lingüística. Bradyame al [phone].    We comply with applicable federal civil rights laws and [place] laws. We do not discriminate on the basis of race, color, national origin, age, disability sex, sexual orientation or gender identity.            Thank you!     Thank you for choosing [facility]  for your care. Our goal is always to provide you with excellent care. Hearing back from our patients is one way we can continue to improve our services. Please take a few minutes to complete the written survey that you may receive in the mail after your visit with us. Thank you!             Your Updated Medication List - Protect others around you: Learn how to safely use, store and throw away your medicines at www.disposemymeds.org.          This list is accurate as of: 17  9:16 AM.  Always use your most recent med list.                   Brand Name Dispense Instructions for use Diagnosis    clonazePAM 1 MG tablet    klonoPIN    90 tablet    Take 1 tablet (1 mg) by mouth 3 times daily as needed for anxiety Increased dose.    Postpartum anxiety       ibuprofen 800 MG tablet    ADVIL/MOTRIN    90 tablet    Take 1 tablet (800 mg) by mouth every 8 hours as needed for moderate pain     (spontaneous vaginal  delivery)       medroxyPROGESTERone 150 MG/ML injection    DEPO-PROVERA    1 mL    Inject 1 mL (150 mg) into the muscle every 3 months    Depo-Provera contraceptive status, General counseling and advice on contraceptive management

## 2017-07-13 NOTE — PATIENT INSTRUCTIONS
Treatment Plan:    Continue Klonopin (clonazepam) 1 mg up to 3 times per day for anxiety.     Continue all other medications as reviewed per electronic medical record today.     All questions addressed. Education and counseling completed regarding risks and benefits of medications and psychotherapy options.    Safety plan was reviewed. To the Emergency Department as needed or call after hours crisis line at [phone] or [phone].     Continue individual/group therapy as planned with SARAHI Muir, [facility].     Schedule an appointment with me in 6-8 weeks or sooner as needed.     Follow up with primary care provider as planned or for acute medical concerns.    Call the psychiatric nurse line with medication questions or concerns at [phone].    My Practice Policy was reviewed and signed: YES     MyChart may be used to communicate with your provider, but this is not intended to be used for emergencies.

## 2017-07-13 NOTE — PROGRESS NOTES
"    Outpatient Psychiatric Progress Note    Name: Carolyn Jacobs   : 1990                    Primary Care Provider: JOSE Bonilla CNP - last visit 17  Therapist: Magaly Redd - last visit 2017    PHQ-9 scores:  PHQ-9 SCORE 5/10/2017 6/15/2017 2017   Total Score 16 27 27       REILLY-7 scores:  REILLY-7 SCORE 5/10/2017 6/15/2017 2017   Total Score 21 21 21       Patient Identification:  Patient is a 27 year old year old, partnered / significant other  White American female  who presents for return visit with me.  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 Patient attended the session alone. Patient prefers to be called: \"Carolyn\"    Interim History:    I last saw Carolyn Jacobs for outpatient psychiatry Return Visit on 6/15/2017.     During that appointment, we Discontinue Zoloft (sertraline) and Abilify (aripriprazole) and Neurontin (gabapentin).     Klonopin (clonazepam) 1 mg up to 3 times per day for anxiety.     Start Buspar (buspirone) 5 mg two times daily for one week, then increase to 10 mg two times daily. For anxiety. Target dose 30 - 60 mg daily.      Current medications include:   Current Outpatient Prescriptions   Medication Sig     clonazePAM (KLONOPIN) 1 MG tablet Take 1 tablet (1 mg) by mouth 3 times daily as needed for anxiety Increased dose.     medroxyPROGESTERone (DEPO-PROVERA) 150 MG/ML injection Inject 1 mL (150 mg) into the muscle every 3 months     ibuprofen (ADVIL/MOTRIN) 800 MG tablet Take 1 tablet (800 mg) by mouth every 8 hours as needed for moderate pain     venlafaxine (EFFEXOR-XR) 37.5 MG 24 hr capsule Take 1 capsule daily for 7 days, then take 2 capsules daily. (Patient not taking: Reported on 2017)     busPIRone (BUSPAR) 10 MG tablet Start 1/2 tablet two times per day for one week, then increase to 1 tablet two times per day. For anxiety. (Patient not taking: Reported on 2017)     No current facility-administered medications for this visit.  " "      The [place] Prescription Monitoring Program has been reviewed and there are no concerns about diversionary activity for controlled substances at this time.  Klonopin (clonazepam) 1 mg, 90 tablets filled 5/10/2017, 6/7/2017, 7/5/2017 from me. 1 refill remaining.     I was able to review most recent Primary Care Provider, specialty provider, and therapy visit notes that I have access to.     Patient did not start Buspar (buspirone) and did not give Effexor (venlafaxine) long enough time to work.     Carolyn ESTEPHANIE Jacobs reports mood has been: \"not good\" Reports many stressors at home still.   Anxiety has been: \"same\" reports the Klonopin (clonazepam) is the only medication that \"does not mess with my head\" reports that she does not want to take the SSRI, SNRI medications because they cause suicidal ideation. Has been taking more Klonopin (clonazepam) than prescribed.   Has been able to care for the children, but still stressed.   Sleep has been: \"waking up in panic\" related to an incident where son was trying to leave the house at night during 4th of July. Nightmares remain.   Still struggling with binge eating and purging.   PHQ9 and GAD7 scores were reviewed today.   Medication side effects: Denies  Current stressors include: Symptoms, Parenting Stress, Relationship Difficulties  Coping mechanisms and supports include: Children, Therapy, Essential Oils    Past medication trials include but are not limited to:   Klonopin (clonazepam) 2014 and recently  Zoloft (sertraline)  Celexa (citalopram) 2014  Lexapro (escitalopram) 2014  Prozac (fluoxetine) 2015  Wellbutrin (buproprion) ?  Vistaril/Atarax (hydroxyzine)  Adderall (amphetamine salts)   Ritalin (methylphenidate)   Ativan (lorazepam) 2015  Chantix (varenicline)   Neurontin (gabapentin)  Abilify (aripriprazole)   Effexor (venlafaxine)  Buspar (buspirone) never started    Past Medical History:   Diagnosis Date     Abnormal Pap smear     HPV & normal      Anemia  "    not on iron vits, O+ blood type     Anxiety      Panic attacks      Varicosities       has a past medical history of Abnormal Pap smear; Anemia; Anxiety; Panic attacks; and Varicosities.    Social History:  Current Living situation: [place] with Son and Daughter and Mom. Feels safe at home.  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 No Nicotine Replacement Therapy tried: Cold turkey, Chantix and went back to smoking due to stress   Caffeine: Yes 1-2 cups/day of coffee  Recovery Programming Involvement: None    Vital Signs:   /60 (BP Location: Right arm, Patient Position: Chair, Cuff Size: Adult Regular)  Pulse 69  Temp 98.2  F (36.8  C) (Oral)  Wt 146 lb (66.2 kg)  LMP 07/13/2017  SpO2 100%  BMI 25.06 kg/m2    Labs:  Most recent laboratory results reviewed and no new labs     Review of Systems:  10 systems (general, cardiovascular, respiratory, eyes, ENT, endocrine, GI, , M/S, neurological) were reviewed. Most pertinent finding(s) is/are: headaches continue and are alleviated by over the counter medications, dry mouth alleviated by water, hair loss. The remaining systems are all unremarkable.    Mental Status Examination:  Appearance: awake, alert, adequately groomed, appeared stated age, moderate distress, normal weight, wears makeup  Attitude: cooperative   Eye Contact: adequate  Gait and Station: Normal, No assistive Devices used and No dizziness or falls  Psychomotor Behavior: no evidence of tardive dyskinesia, dystonia, or tics  Oriented to: time, person, and place  Attention Span and Concentration: Normal  Speech: clear, coherent, regular rate, regular rhythm and fluent  Mood: anxious and sad, better, but still anxious  Affect: mood congruent, intensity is heightened  Associations: no loose associations  Thought Process: logical, linear and goal oriented  Thought Content: no evidence of suicidal  "ideation or homicidal ideation, no evidence of psychotic thought, no auditory hallucinations present, no visual hallucinations present and Appropriate to Interview  Recent and Remote Memory: intact Not formally assessed. No amnesia.  Fund of Knowledge: appropriate  Insight: fair  Judgment: fair  Impulse Control: fair      Suicide Risk Assessment:  Today Carolyn Jacobs reports \"I would not harm myself because I can't do that to my children\". Reports only one suicidal thought after first starting Zoloft (sertraline) and continues to denies thoughts to harm self or others. In addition, there are notable risk factors for self-harm, including age and anxiety. However, risk is mitigated by commitment to family, sobriety, absence of past attempts, ability to volunteer a safety plan, history of seeking help when needed, future oriented, no access to firearms or weapons, identifies reasons to live including children, denies suicidal intent or plan, no family history of suicide and denies homicidal ideation, intent, or plan. Therefore, based on all available evidence including the factors cited above, Carolyn Jacobs does not appear to be at imminent risk for self-harm, does not meet criteria for a 72-hr hold, and therefore remains appropriate for ongoing outpatient level of care. A thorough assessment of risk factors related to suicide and self-harm have been reviewed and are noted above.  Local community resources safety reviewed and printed for patient to use if needed. There was no deceit detected, and the patient presented in a manner that was believable.       DSM5 Diagnosis:  296.32 Major Depressive Disorder, Recurrent Episode, Severe with anxious distress and With peripartum onset   Rule out 301.13 Cyclothymic Disorder vs Bipolar Disorder  300.02 (F41.1) Generalized Anxiety Disorder  Eating Disorder- purging and restriction  Borderline Personality Disorder     Medical comorbidities include:   Patient Active Problem " List    Diagnosis Date Noted     REILLY (generalized anxiety disorder) 03/14/2017     Priority: Medium     Moderate episode of recurrent major depressive disorder (H) 03/14/2017     Priority: Medium     Supervision of normal pregnancy 09/19/2016     Priority: Medium     Dysthymia 10/20/2015     Priority: Medium     Migraine 07/15/2015     Priority: Medium     Acetabular labrum tear 01/03/2015     Priority: Medium     Postpartum anxiety 10/10/2014     Priority: Medium     CARDIOVASCULAR SCREENING; LDL GOAL LESS THAN 160 09/29/2014     Priority: Medium       Psychosocial & Contextual Factors:  Parent/Child Relationship Difficulties and Relationship Difficulties    Assessment:  Carolyn Jacobs reports ongoing emotional distress, poor sleep, disordered eating, and depression. This is exacerbated with ongoing psychosocial stressors. Medication side effects and alternatives were reviewed. Health promotion activities recommended and reviewed today. Discussed referral to psychologist and long term psychiatrist. Hesitant to try other medications and see psychiatrist in community. Specifically discussed Buspar (buspirone) and patient's concern about this medication. She was reading reviews online. She unfortunately has no desire to change or add other medications. I reviewed I will not increase Klonopin (clonazepam) and will be referring her out for care. She was thanked for our work together.     Treatment Plan:    Continue Klonopin (clonazepam) 1 mg up to 3 times per day for anxiety.     Continue all other medications as reviewed per electronic medical record today.     All questions addressed. Education and counseling completed regarding risks and benefits of medications and psychotherapy options.    Safety plan was reviewed. To the Emergency Department as needed or call after hours crisis line at [phone] or [phone].     Continue individual/group therapy as planned with SARAHI Muir, [facility].      Schedule an appointment with me in 6-8 weeks or sooner as needed. Call [facility] at [phone] to schedule.    Follow up with primary care provider as planned or for acute medical concerns.    Call the psychiatric nurse line with medication questions or concerns at [phone].    My Practice Policy was reviewed and signed: YES     MyChart may be used to communicate with your provider, but this is not intended to be used for emergencies.    Administrative Billing:   Time spent with patient was 30 minutes and greater than 50% of time or 20 minutes was spent in counseling and coordination of care.    Patient Status:  The patient is being returned to the referring provider for ongoing care and medication prescribing.  The patient can be referred back to this service for further consultation as needed. Patient is being referred to long term psychiatry provider. Referral placed today.     Signed:   Carmenza Menard, PhD, APRN, CNP   Psychiatry

## 2017-07-14 ENCOUNTER — TELEPHONE (OUTPATIENT)
Dept: PSYCHIATRY | Facility: CLINIC | Age: 27
End: 2017-07-14

## 2017-07-14 ASSESSMENT — ANXIETY QUESTIONNAIRES: GAD7 TOTAL SCORE: 21

## 2017-07-14 ASSESSMENT — PATIENT HEALTH QUESTIONNAIRE - PHQ9: SUM OF ALL RESPONSES TO PHQ QUESTIONS 1-9: 27

## 2017-07-14 NOTE — TELEPHONE ENCOUNTER
----- Message from Lynn Lancaster sent at 7/14/2017 11:50 AM CDT -----  Regarding: MENTAL HEALTH ORDER  Patient was contacted by [facility]. Patient was scheduled for medication management services with Diana Santoro on 7.27.17 at 10:30am.    Patient declined scheduling therapy services at this time due to scheduling difficulties.    Lynn Chowdary  , [facility]

## 2017-07-17 ENCOUNTER — OFFICE VISIT (OUTPATIENT)
Dept: BEHAVIORAL HEALTH | Facility: CLINIC | Age: 27
End: 2017-07-17
Payer: COMMERCIAL

## 2017-07-17 DIAGNOSIS — F41.1 GAD (GENERALIZED ANXIETY DISORDER): Primary | ICD-10-CM

## 2017-07-17 DIAGNOSIS — F60.3 BORDERLINE PERSONALITY DISORDER (H): ICD-10-CM

## 2017-07-17 DIAGNOSIS — F33.2 SEVERE EPISODE OF RECURRENT MAJOR DEPRESSIVE DISORDER, WITHOUT PSYCHOTIC FEATURES (H): ICD-10-CM

## 2017-07-17 PROCEDURE — 90837 PSYTX W PT 60 MINUTES: CPT | Performed by: MARRIAGE & FAMILY THERAPIST

## 2017-07-17 NOTE — MR AVS SNAPSHOT
After Visit Summary   7/17/2017    Carolyn Jacobs    MRN: [id number]           Patient Information     Date Of Birth          1990        Visit Information        Provider Department      7/17/2017 10:00 AM Magaly Redd LMFT [facility]        Today's Diagnoses     REILLY (generalized anxiety disorder)    -  1    Severe episode of recurrent major depressive disorder, without psychotic features (H)        Borderline personality disorder           Follow-ups after your visit        Your next 10 appointments already scheduled     Aug 07, 2017 10:00 AM CDT   Return Visit with SARAHI Martínez   [facility] ([facility])    [address]  [facility] [address] [phone]              Who to contact     If you have questions or need follow up information about today's clinic visit or your schedule please contact [facility] directly at [phone].  Normal or non-critical lab and imaging results will be communicated to you by MyChart, letter or phone within 4 business days after the clinic has received the results. If you do not hear from us within 7 days, please contact the clinic through [facility] or phone. If you have a critical or abnormal lab result, we will notify you by phone as soon as possible.  Submit refill requests through [facility] or call your pharmacy and they will forward the refill request to us. Please allow 3 business days for your refill to be completed.          Additional Information About Your Visit        [facility] Information     [facility] gives you secure access to your electronic health record. If you see a primary care provider, you can also send messages to your care team and make appointments. If you have questions, please call your primary care clinic.  If you do not have a primary care provider, please call [phone] and they will assist you.        Care EveryWhere ID     This is  your Care EveryWhere ID. This could be used by other organizations to access your [facility] medical records  [id number]        Your Vitals Were     Last Period                   07/13/2017            Blood Pressure from Last 3 Encounters:   07/13/17 110/60   06/15/17 102/60   05/17/17 106/64    Weight from Last 3 Encounters:   07/13/17 66.2 kg (146 lb)   06/15/17 64 kg (141 lb)   05/17/17 66.7 kg (147 lb)              Today, you had the following     No orders found for display       Primary Care Provider Office Phone # Fax #    Arleen Temple JOSE Napier [phone]       [facility] [address] [facility] [address]        Equal Access to Services     HANSEL VERDUGO : Hadii antelmo mccarthyo Soomaali, waaxda luqadaha, qaybta kaalmada adeegyada, russel chester . So [facility] [phone].    ATENCIÓN: Si habla español, tiene a redding disposición servicios gratuitos de asistencia lingüística. Llame al [phone].    We comply with applicable federal civil rights laws and [place] laws. We do not discriminate on the basis of race, color, national origin, age, disability sex, sexual orientation or gender identity.            Thank you!     Thank you for choosing [facility]  for your care. Our goal is always to provide you with excellent care. Hearing back from our patients is one way we can continue to improve our services. Please take a few minutes to complete the written survey that you may receive in the mail after your visit with us. Thank you!             Your Updated Medication List - Protect others around you: Learn how to safely use, store and throw away your medicines at www.disposemymeds.org.          This list is accurate as of: 7/17/17 11:59 PM.  Always use your most recent med list.                   Brand Name Dispense Instructions for use Diagnosis    clonazePAM 1 MG tablet    klonoPIN    90 tablet    Take 1 tablet (1 mg) by mouth 3  times daily as needed for anxiety Increased dose.    Postpartum anxiety       ibuprofen 800 MG tablet    ADVIL/MOTRIN    90 tablet    Take 1 tablet (800 mg) by mouth every 8 hours as needed for moderate pain     (spontaneous vaginal delivery)       medroxyPROGESTERone 150 MG/ML injection    DEPO-PROVERA    1 mL    Inject 1 mL (150 mg) into the muscle every 3 months    Depo-Provera contraceptive status, General counseling and advice on contraceptive management

## 2017-07-17 NOTE — PROGRESS NOTES
[facility]  June 27, 2017      Behavioral Health Clinician Progress Note    Patient Name: Carolyn Jacobs           Service Type: Individual      Service Location:   Face to Face in Clinic     Session Start Time: 9:55 Session End Time: 10:50      Session Length: 16 - 37      Attendees: Client  Visit Activities (Refresh list every visit): [facility] Only    Diagnostic Assessment Date: 3/14/17  Treatment Plan Review Date:7/17/17  See Flowsheets for today's PHQ-9 and REILLY-7 results  Previous PHQ-9:   PHQ-9 SCORE 5/10/2017 6/15/2017 7/13/2017   Total Score - - -   Total Score 16 27 27     Previous REILLY-7:   REILLY-7 SCORE 5/10/2017 6/15/2017 7/13/2017   Total Score - - -   Total Score 21 21 21       SARAI LEVEL:  No flowsheet data found.    DATA  Extended Session (60+ minutes): No  Interactive Complexity: Yes, visit entailed Interactive Complexity evidenced by:  -The need to manage maladaptive communication (related to, e.g., high anxiety, high reactivity, repeated questions, or disagreement) among participants that complicates delivery of care  Crisis: No    Treatment Objective(s) Addressed in This Session:  Target Behavior(s): diet/weight loss    Depressed Mood: Increase interest, engagement, and pleasure in doing things  Decrease frequency and intensity of feeling down, depressed, hopeless  Improve quantity and quality of night time sleep / decrease daytime naps  Feel less tired and more energy during the day   Improve diet, appetite, mindful eating, and / or meal planning  Identify negative self-talk and behaviors: challenge core beliefs, myths, and actions  Improve concentration, focus, and mindfulness in daily activities   Feel less fidgety, restless or slow in daily activities / interpersonal interactions  Anxiety: will experience a reduction in anxiety, will develop more effective coping skills to manage anxiety symptoms, will develop healthy cognitive patterns and beliefs and will increase  ability to function adaptively  Relationship Problems: will address relationship difficulties in a more adaptive manner    Current Stressors / Issues:  Stress is the same. Roddy has been traveling. Son got out on the 4th of July while she was sleeping at night. Processed with Pt what Borderline is.   A pervasive pattern of instability of interpersonal relationships, self-image, and affects, and marked impulsivity beginning by early adulthood and present in a variety of contexts, as indicated by five (or more) of the following:   (1) frantic efforts to avoid real or imagined abandonment.   Note: Do not include suicidal or self-mutilating behavior covered in Criterion 5.   (2) a pattern of unstable and intense interpersonal relationships characterized by alternating between extremes of idealization and devaluation   (3) identity disturbance: markedly and persistently unstable self-image or sense of self   (4) impulsivity in at least two areas that are potentially self-damaging (e.g., spending, sex, Substance Abuse, reckless driving, binge eating).   Note: Do not include suicidal or self-mutilating behavior covered in Criterion 5.   (5) recurrent suicidal behavior, gestures, or threats, or self-mutilating behavior   (6) affective instability due to a marked reactivity of mood (e.g., intense episodic dysphoria, irritability, or anxiety usually lasting a few hours and only rarely more than a few days)   (7) chronic feelings of emptiness   (8) inappropriate, intense anger or difficulty controlling anger (e.g., frequent displays of temper, constant anger, recurrent physical fights)   (9) transient, stress-related paranoid ideation or severe dissociative symptoms        Progress on Treatment Objective(s) / Homework:  Minimal progress - CONTEMPLATION (Considering change and yet undecided); Intervened by assessing the negative and positive thinking (ambivalence) about behavior change    Motivational Interviewing    MI  Intervention: Co-Developed Goal: reduce her anxiety and Expressed Empathy/Understanding     Change Talk Expressed by the Patient: Desire to change    Provider Response to Change Talk: E - Evoked more info from patient about behavior change      Situation        Automatic Thoughts  Cognitive Distortions      Feelings        Behavior        Questioning Thoughts              Care Plan review completed: Yes    Medication Review:  No changes to current psychiatric medication(s)    Medication Compliance:  NA    Changes in Health Issues:   None reported    Chemical Use Review:   Substance Use: Chemical use reviewed, no active concerns identified      Tobacco Use: No current tobacco use.      Assessment: Current Emotional / Mental Status (status of significant symptoms):  Risk status (Self / Other harm or suicidal ideation)  Patient denies a history of suicidal ideation, suicide attempts, self-injurious behavior, homicidal ideation, homicidal behavior and and other safety concerns  Patient denies current fears or concerns for personal safety.  Patient denies current or recent suicidal ideation or behaviors.  Patient denies current or recent homicidal ideation or behaviors.  Patient denies current or recent self injurious behavior or ideation.  Patient denies other safety concerns.  A safety and risk management plan has not been developed at this time, however patient was encouraged to call [place] [phone] should there be a change in any of these risk factors.    Appearance:   Appropriate   Eye Contact:   Good   Psychomotor Behavior: Normal   Attitude:   Cooperative   Orientation:   All  Speech   Rate / Production: Normal    Volume:  Normal   Mood:    Normal  Affect:    Appropriate  Flat   Thought Content:  Clear   Thought Form:  Coherent  Logical   Insight:    Good     Diagnoses:  296.32 Major Depressive Disorder, Recurrent Episode, Severe with anxious distress and With peripartum onset                         Rule out  301.13 Cyclothymic Disorder vs Bipolar Disorder  300.02 (F41.1) Generalized Anxiety Disorder  Eating Disorder- purging and restriction  Borderline Personality Disorder      Collateral Reports Completed:  Not Applicable    Plan: (Homework, other):  Patient was given information about behavioral services and encouraged to schedule a follow up appointment with the clinic [facility] in 2 weeks.  She was also given information about mental health symptoms and treatment options  and Cognitive Behavioral Therapy skills to practice when experiencing anxiety and depression.  CD Recommendations: No indications of CD issues.  SARAHI Muir, [facility]      ______________________________________________________________________    Integrated Primary Care Behavioral Health Treatment Plan    Patient's Name: Carolyn Jacobs  YOB: 1990    Date: 7/17/17    DSM-V Diagnoses: 296.32 Major Depressive Disorder, Recurrent Episode, Moderate _ and With anxious distress or 300.02 (F41.1) Generalized Anxiety Disorder  Psychosocial / Contextual Factors: money, family  WHODAS: 25    Referral / Collaboration:  Was/were discussed and client will pursue.Long term therapy. Waiting for Vianca to be on [facility].    Anticipated number of session or this episode of care: 6-8      MeasurableTreatment Goal(s) related to diagnosis / functional impairment(s)  Goal 1: Patient will increase her coping skills to reduce her anxiety    I will know I've met my goal when less anxious.      Objective #A (Patient Action)    Patient will use  worry time  each day for 15 minutes of scheduled worry and then defer obsessive or anxious thinking until the next structured worry time.  Status: New - Date: 7/17/17     Intervention(s)  [facility] will teach the client how to complete a 4-part pros and cons. About her negtive talk aptterns to help her during her worry time.        Patient has reviewed and agreed to the above plan.      SARAHI Martínez  July 17, 2017

## 2017-07-21 ENCOUNTER — OFFICE VISIT (OUTPATIENT)
Dept: INTERNAL MEDICINE | Facility: CLINIC | Age: 27
End: 2017-07-21
Payer: COMMERCIAL

## 2017-07-21 VITALS
HEART RATE: 72 BPM | WEIGHT: 148 LBS | SYSTOLIC BLOOD PRESSURE: 90 MMHG | DIASTOLIC BLOOD PRESSURE: 60 MMHG | HEIGHT: 64 IN | RESPIRATION RATE: 12 BRPM | OXYGEN SATURATION: 99 % | TEMPERATURE: 98.3 F | BODY MASS INDEX: 25.27 KG/M2

## 2017-07-21 DIAGNOSIS — Z79.899 CONTROLLED SUBSTANCE AGREEMENT SIGNED: ICD-10-CM

## 2017-07-21 DIAGNOSIS — S73.191D ACETABULAR LABRUM TEAR, RIGHT, SUBSEQUENT ENCOUNTER: ICD-10-CM

## 2017-07-21 DIAGNOSIS — M25.551 HIP PAIN, RIGHT: Primary | ICD-10-CM

## 2017-07-21 PROCEDURE — 99213 OFFICE O/P EST LOW 20 MIN: CPT | Performed by: NURSE PRACTITIONER

## 2017-07-21 RX ORDER — TRAMADOL HYDROCHLORIDE 50 MG/1
50 TABLET ORAL EVERY 8 HOURS PRN
Qty: 90 TABLET | Refills: 0 | Status: SHIPPED | OUTPATIENT
Start: 2017-07-21 | End: 2017-08-15

## 2017-07-21 NOTE — MR AVS SNAPSHOT
After Visit Summary   7/21/2017    Carolyn Jacobs    MRN: [id number]           Patient Information     Date Of Birth          1990        Visit Information        Provider Department      7/21/2017 2:00 PM Arleen Napier APRN CNP [facility]        Today's Diagnoses     Hip pain, right    -  1    Acetabular labrum tear, right, subsequent encounter          Care Instructions    Tramadol 1 tab every 8 hours as needed for pain     Ortho referral  [phone]          Follow-ups after your visit        Additional Services     ORTHO  REFERRAL       [facility] is referring you to the Orthopedic  Services at [facility].       The  Representative will assist you in the coordination of your Orthopedic and Musculoskeletal Care as prescribed by your physician.    The  Representative will call you within 1 business day to help schedule your appointment, or you may contact the  Representative at:    All areas ~ [phone]     Type of Referral : Surgical / Specialist       Timeframe requested: Routine    Coverage of these services is subject to the terms and limitations of your health insurance plan.  Please call member services at your health plan with any benefit or coverage questions.      If X-rays, CT or MRI's have been performed, please contact the facility where they were done to arrange for , prior to your scheduled appointment.  Please bring this referral request to your appointment and present it to your specialist.                  Your next 10 appointments already scheduled     Aug 07, 2017 10:00 AM CDT   Return Visit with SARAHI Martínez   [facility]    [address] [facility] [address]  [facility] [id number] [phone]              Who to contact     If you have questions or need follow up information about today's clinic  "visit or your schedule please contact [facility] directly at [phone].  Normal or non-critical lab and imaging results will be communicated to you by MyChart, letter or phone within 4 business days after the clinic has received the results. If you do not hear from us within 7 days, please contact the clinic through [employer] or phone. If you have a critical or abnormal lab result, we will notify you by phone as soon as possible.  Submit refill requests through [employer] or call your pharmacy and they will forward the refill request to us. Please allow 3 business days for your refill to be completed.          Additional Information About Your Visit        [employer] Information     [employer] gives you secure access to your electronic health record. If you see a primary care provider, you can also send messages to your care team and make appointments. If you have questions, please call your primary care clinic.  If you do not have a primary care provider, please call [phone] and they will assist you.        Care EveryWhere ID     This is your Care EveryWhere ID. This could be used by other organizations to access your [facility] medical records  [id number]        Your Vitals Were     Pulse Temperature Respirations Height Last Period Pulse Oximetry    72 98.3  F (36.8  C) (Oral) 12 5' 4\" (1.626 m) 07/13/2017 99%    BMI (Body Mass Index)                   25.4 kg/m2            Blood Pressure from Last 3 Encounters:   07/21/17 90/60   07/13/17 110/60   06/15/17 102/60    Weight from Last 3 Encounters:   07/21/17 148 lb (67.1 kg)   07/13/17 146 lb (66.2 kg)   06/15/17 141 lb (64 kg)              We Performed the Following     ORTHO  REFERRAL          Today's Medication Changes          These changes are accurate as of: 7/21/17  2:38 PM.  If you have any questions, ask your nurse or doctor.               Start taking these medicines.        Dose/Directions    traMADol 50 MG tablet   Commonly known " as:  ULTRAM   Used for:  Hip pain, right, Acetabular labrum tear, right, subsequent encounter   Started by:  Arleen Napier APRN CNP        Dose:  50 mg   Take 1 tablet (50 mg) by mouth every 8 hours as needed for pain   Quantity:  90 tablet   Refills:  0            Where to get your medicines      Some of these will need a paper prescription and others can be bought over the counter.  Ask your nurse if you have questions.     Bring a paper prescription for each of these medications     traMADol 50 MG tablet                Primary Care Provider Office Phone # Fax #    JOSE Bonilla [phone]       [facility] [address] [facility] [address]        Equal Access to Services     HANSEL VERDUGO : Jaci mccarthyo Soelen, waaxda luqadaha, qaybta kaalmada adeegyada, russel chester . So [facility] [phone].    ATENCIÓN: Si habla español, tiene a redding disposición servicios gratuitos de asistencia lingüística. Llame al [phone].    We comply with applicable federal civil rights laws and [place] laws. We do not discriminate on the basis of race, color, national origin, age, disability sex, sexual orientation or gender identity.            Thank you!     Thank you for choosing [facility]  for your care. Our goal is always to provide you with excellent care. Hearing back from our patients is one way we can continue to improve our services. Please take a few minutes to complete the written survey that you may receive in the mail after your visit with us. Thank you!             Your Updated Medication List - Protect others around you: Learn how to safely use, store and throw away your medicines at www.disposemymeds.org.          This list is accurate as of: 7/21/17  2:38 PM.  Always use your most recent med list.                   Brand Name Dispense Instructions for use Diagnosis    clonazePAM 1 MG tablet    klonoPIN    90  tablet    Take 1 tablet (1 mg) by mouth 3 times daily as needed for anxiety Increased dose.    Postpartum anxiety       ibuprofen 800 MG tablet    ADVIL/MOTRIN    90 tablet    Take 1 tablet (800 mg) by mouth every 8 hours as needed for moderate pain     (spontaneous vaginal delivery)       medroxyPROGESTERone 150 MG/ML injection    DEPO-PROVERA    1 mL    Inject 1 mL (150 mg) into the muscle every 3 months    Depo-Provera contraceptive status, General counseling and advice on contraceptive management       traMADol 50 MG tablet    ULTRAM    90 tablet    Take 1 tablet (50 mg) by mouth every 8 hours as needed for pain    Hip pain, right, Acetabular labrum tear, right, subsequent encounter

## 2017-07-21 NOTE — LETTER
[facility]    07/21/17    Patient: Carolyn Jacobs  YOB: 1990  Medical Record Number: [id number]                                                                  Controlled Substance Agreement  I understand that my care provider has prescribed controlled substances (narcotics, tranquilizers, and/or stimulants) to help manage my condition(s).  I am taking this medicine to help me function or work.  I know that this is strong medicine.  It could have serious side effects and even cause a dependency on the drug.  If I stop these medicines suddenly, I could have severe withdrawal symptoms.    The risks, benefits, and side effects of these medication(s) were explained to me.  I agree that:  1. I will take part in other treatments as advised by my provider.  This may be psychiatry or counseling, physical therapy, behavioral therapy, group treatment, or a referral to a pain clinic.  I will reduce or stop my medicine when my provider tells me to do so.   2. I will take my medicines as prescribed.  I will not change the dose or schedule unless my provider tells me to.  There will be no refills if I  run out early.   I may be contacted at any time without warning and asked to complete a drug test or pill count.   3. I will keep all my appointments at the clinic.  If I miss appointments or fail to follow instructions, my provider may stop my medicine.  4. I will not ask other providers to prescribe controlled substances. And I will not accept controlled substances from other people. If I need another prescribed controlled substance for a new reason, I will notify my provider within one business day.  5. If I enroll in the [place] Medical Marijuana program, I will tell my provider.  I will also sign an agreement to share my medical records with my provider.  6. I will use one pharmacy to fill all of my controlled substance prescriptions.  If my prescription is mailed to my pharmacy, it may take  5 to 7 days for my medicine to be ready.  7. I understand that my provider, clinic care team, and pharmacy can track controlled substance prescriptions from other providers through a central database (prescription monitoring program).  8. I will bring in my list of medications (or my medicine bottles) each time I come to the clinic.  REV- 04/2016                                                                                                                                            Page 1 of 2      [facility]    07/21/17    Patient: Carolyn Jacobs  YOB: 1990  Medical Record Number: [id number]    9. Refills of controlled substances will be made only during office hours.  It is up to me to make sure that I do not run out of my medicines on weekends or holidays.    10. I am responsible for my prescriptions.  If the medicine is lost or stolen, it will not be replaced.   I also agree not to share these medicines with anyone.  11. I agree to not use ANY illegal or recreational drugs.  This includes marijuana, cocaine, bath salts or other drugs.  I agree not to use alcohol unless my provider says I may.  I agree to give urine samples whenever asked.  If I fail to give a urine sample, the provider may stop my medicine.     12. I will tell my nurse or provider right away if I become pregnant or have a new medical problem treated outside of [facility].  13. I understand that this medicine can affect my thinking and judgment.  It may be unsafe for me to drive, use machinery and do dangerous tasks.  I will not do any of these things until I know how the medicine affects me.  If my dose changes, I will wait to see how it affects me.  I will contact my provider if I have concerns about medicine side effects.  I understand that if I do not follow any of the conditions above, my prescriptions or treatment may be stopped.    I agree that my provider, clinic care team, and pharmacy may work with  any city, state or federal law enforcement agency that investigates the misuse, sale, or other diversion of my controlled medicine. I will allow my provider to discuss my care with or share a copy of this agreement with any other treating provider, pharmacy or emergency room where I receive care.  I agree to give up (waive) any right of privacy or confidentiality with respect to these authorizations.   I have read this agreement and have asked questions about anything I did not understand.   ___________________________________    ___________________________  Patient Signature                                                           Date and Time  ___________________________________     ____________________________  Witness                                                                            Date and Time  ___________________________________  JOSE Bonilla CNP  REV-  04/2016                                                                                                                                                                 Page 2 of 2  Opioid Pain Medicines (also known as Narcotics)  What You Need to Know      What are opioids?   Opioids are pain medicines that must be prescribed by a doctor. Examples are:     morphine (MS Contin, Mari)    oxycodone (Oxycontin)    oxycodone and acetaminophen (Percocet)    hydrocodone and acetaminophen (Vicodin, Norco)     fentanyl patch (Duragesic)     hydromorphone (Dilaudid)     methadone     What do opioids do well?   Opioids are best for short-term pain after a surgery or injury. They also work well for cancer pain. Unlike other pain medicines, they do not cause liver or kidney failure or ulcers. They may help some people with long-lasting (chronic) pain.     What do opioids NOT do well?   Opioids never get rid of pain entirely, and they do not work well for most patients with chronic pain. Opioids do not reduce swelling, one of the causes of pain.  They also don t work well for nerve pain.     Side effects  Talk to your doctor before you start or decide to keep taking one of these medicines. Side effects include:    Lowers your breathing rate enough that it could cause death    Death due to taking more than the prescribed dose    Serious lifelong opioid use      Dependence is not the same as addiction. Addiction is when people keep using a substance that harms their body, their mind or their relations with others. If you have a history of drug or alcohol abuse, taking opioids can cause a relapse.  Over time, opioids don t work as well. Most people will need higher and higher doses. The higher the dose, the more serious the side effects. We don t know the long-term effects of opioids.   People who have used opioids for a long time have a lower quality of life, worse depression, higher levels of pain and more visits to doctors.  Overdose from prescription drugs is the second leading cause of death in the [place] The risk of overdose rises when opioids are taken with other drugs such as:    Medicines used for anxiety and panic attacks (such as lorazepam, alprazolam, clonazepam    Other sedatives    Alcohol    Illegal drugs such as heroin  Never share your opioids with others. Be sure to store opioids in a secure place, locked if possible.Young children can easily swallow them and overdose.     Are there other ways to manage pain?  Ways to help reduce pain:    Exercise every day.    Treat health problems that may be causing pain.    Treat mental health problems like depression and anxiety.     Worse depression symptoms; Less pleasure in things you usually enjoy    Feeling tired or sluggish    Slower thoughts or cloudy thinking    Being more sensitive to pain over time; Pain is harder to control.    Trouble sleeping or restless sleep    Changes in hormone levels (for example, less testosterone).     Changes in sex drive or ability to have sex    Long lasting nausea  and constipation    Trouble breathing while asleep; This is worse with lung problems like COPD or sleep apnea.    Unsafe driving    Getting sick more often    Itching    Feeling dizzy    Dry mouth    Sweating    Trouble emptying the bladder (peeing). This is worse if you have an enlarged prostate or get urinary tract infections (UTIs).    What else should I know about opioids?  When someone takes opioids for too long or too often, they become dependent. This means that if you stop or reduce the medicine too quickly, you will have withdrawal symptoms.          Practice good sleep habits.  Try to go to bed and get up at the same time every day.    Stop smoking.  Tobacco use can make pain worse.    Do things that you enjoy.    Find a way to work through pain without drugs.  Try deep breathing, meditation, visual imagery and aromatherapy.    Ask your doctor to help you create a plan to manage your pain.

## 2017-07-21 NOTE — PROGRESS NOTES
SUBJECTIVE:                                                    Carolyn Jacobs is a 27 year old female who presents to clinic today for the following health issues:  With back and hip pain   Has had forever     Working out and physical therapy exercises at home     Hips are worse with new baby   Right hip labrum torn and told she needs surgery     Used to be on tramadol in past  800 mg ibuprofen does not seem to help     Son with autism and cannot take time off for surgery    Family not supportive and  travel for work           Problem list and histories reviewed & adjusted, as indicated.  Additional history: as documented    Patient Active Problem List   Diagnosis     CARDIOVASCULAR SCREENING; LDL GOAL LESS THAN 160     Postpartum anxiety     Acetabular labrum tear     Migraine     Supervision of normal pregnancy     REILLY (generalized anxiety disorder)     Severe episode of recurrent major depressive disorder, without psychotic features (H)     Attention disturbance     Exercise-induced asthma     Current smoker     Borderline personality disorder     Controlled substance agreement signed     Past Surgical History:   Procedure Laterality Date     C INDUCED ABORTN BY DIL/EVAC  2005     wisdom teeth         Social History   Substance Use Topics     Smoking status: Current Some Day Smoker     Packs/day: 0.25     Years: 1.00     Types: Cigarettes     Last attempt to quit: 10/19/2014     Smokeless tobacco: Former User      Comment: 5 cigs per day     Alcohol use No     Family History   Problem Relation Age of Onset     Hypertension Mother      Neurologic Disorder Mother      migraine headache     Depression Sister      Depression Brother              Reviewed and updated as needed this visit by clinical staffTobacco  Soc Hx      Reviewed and updated as needed this visit by Provider         ROS:  Constitutional, HEENT, cardiovascular, pulmonary, GI, , musculoskeletal, neuro, skin, endocrine and psych  "systems are negative, except as otherwise noted.      OBJECTIVE:   BP 90/60 (BP Location: Left arm, Patient Position: Chair, Cuff Size: Adult Regular)  Pulse 72  Temp 98.3  F (36.8  C) (Oral)  Resp 12  Ht 5' 4\" (1.626 m)  Wt 148 lb (67.1 kg)  LMP 07/13/2017  SpO2 99%  BMI 25.4 kg/m2  Body mass index is 25.4 kg/(m^2).  GENERAL: healthy, alert and no distress  RESP: lungs clear to auscultation - no rales, rhonchi or wheezes  CV: regular rate and rhythm, normal S1 S2, no S3 or S4, no murmur, click or rub, no peripheral edema and peripheral pulses strong  MS: no gross musculoskeletal defects noted, no edema; right hip pain  NEURO: Normal strength and tone, mentation intact and speech normal  PSYCH: mentation appears normal, affect normal/bright    Diagnostic Test Results:  none    ASSESSMENT/PLAN:             1. Hip pain, right  Will do course of tramadol for a while - would have her see ortho and see if any options   She is on DEPO and plans to get IUD or nexplanon as DEPOP causing weight gain and acne    - traMADol (ULTRAM) 50 MG tablet; Take 1 tablet (50 mg) by mouth every 8 hours as needed for pain  Dispense: 90 tablet; Refill: 0  - ORTHO  REFERRAL    2. Acetabular labrum tear, right, subsequent encounter    - traMADol (ULTRAM) 50 MG tablet; Take 1 tablet (50 mg) by mouth every 8 hours as needed for pain  Dispense: 90 tablet; Refill: 0  - ORTHO  REFERRAL    3. Controlled substance agreement signed        Patient Instructions   Tramadol 1 tab every 8 hours as needed for pain     Ortho referral  [phone]      JOSE Bonilla [facility]    "

## 2017-08-07 ENCOUNTER — OFFICE VISIT (OUTPATIENT)
Dept: ORTHOPEDICS | Facility: CLINIC | Age: 27
End: 2017-08-07
Payer: COMMERCIAL

## 2017-08-07 VITALS
HEIGHT: 64 IN | SYSTOLIC BLOOD PRESSURE: 100 MMHG | WEIGHT: 148 LBS | DIASTOLIC BLOOD PRESSURE: 68 MMHG | BODY MASS INDEX: 25.27 KG/M2

## 2017-08-07 DIAGNOSIS — M25.859 FEMORAL ACETABULAR IMPINGEMENT: ICD-10-CM

## 2017-08-07 DIAGNOSIS — S73.199A LABRAL TEAR OF HIP JOINT: Primary | ICD-10-CM

## 2017-08-07 PROCEDURE — 99203 OFFICE O/P NEW LOW 30 MIN: CPT | Performed by: ORTHOPAEDIC SURGERY
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
## 2017-08-07 NOTE — MR AVS SNAPSHOT
After Visit Summary   8/7/2017    Carolyn Jacobs    MRN: [id number]           Patient Information     Date Of Birth          1990        Visit Information        Provider Department      8/7/2017 10:30 AM Сергей Perry MD [facility] ORTHOPEDIC SURGERY        Today's Diagnoses     Labral tear of hip joint    -  1    Femoral acetabular impingement           Follow-ups after your visit        Your next 10 appointments already scheduled     Aug 10, 2017  1:00 PM CDT   Office Visit with Jadyn Acuna CNM   [facility] ([facility])    [address] [place]  [facility] [id number] [phone]           Bring a current list of meds and any records pertaining to this visit. For Physicals, please bring immunization records and any forms needing to be filled out. Please arrive 10 minutes early to complete paperwork.              Who to contact     If you have questions or need follow up information about today's clinic visit or your schedule please contact [facility] ORTHOPEDIC SURGERY directly at [phone].  Normal or non-critical lab and imaging results will be communicated to you by Cityvoxhart, letter or phone within 4 business days after the clinic has received the results. If you do not hear from us within 7 days, please contact the clinic through Vivactat or phone. If you have a critical or abnormal lab result, we will notify you by phone as soon as possible.  Submit refill requests through MR Presta or call your pharmacy and they will forward the refill request to us. Please allow 3 business days for your refill to be completed.          Additional Information About Your Visit        CityvoxharHihoCoder Information     MR Presta gives you secure access to your electronic health record. If you see a primary care provider, you can also send messages to your care team and make appointments. If you have questions, please call your primary care clinic.  If you do  "not have a primary care provider, please call [phone] and they will assist you.        Care EveryWhere ID     This is your Care EveryWhere ID. This could be used by other organizations to access your [facility] medical records  [id number]        Your Vitals Were     Height Last Period BMI (Body Mass Index)             5' 4\" (1.626 m) 07/13/2017 25.4 kg/m2          Blood Pressure from Last 3 Encounters:   08/07/17 100/68   07/21/17 90/60   07/13/17 110/60    Weight from Last 3 Encounters:   08/07/17 148 lb (67.1 kg)   07/21/17 148 lb (67.1 kg)   07/13/17 146 lb (66.2 kg)              Today, you had the following     No orders found for display       Primary Care Provider Office Phone # Fax #    Arleen JOSE Hawkins [facility] [phone]       [facility] [address] [address]        Equal Access to Services     HANSEL VERDUGO : Hadii aad ku hadasho Soomaali, waaxda luqadaha, qaybta kaalmada adeegyada, waxay idiin hayaan autumneg kharash henrik . So [facility] [phone].    ATENCIÓN: Si habla español, tiene a redding disposición servicios gratuitos de asistencia lingüística. Llame al [phone].    We comply with applicable federal civil rights laws and [place] laws. We do not discriminate on the basis of race, color, national origin, age, disability sex, sexual orientation or gender identity.            Thank you!     Thank you for choosing [facility] ORTHOPEDIC SURGERY  for your care. Our goal is always to provide you with excellent care. Hearing back from our patients is one way we can continue to improve our services. Please take a few minutes to complete the written survey that you may receive in the mail after your visit with us. Thank you!             Your Updated Medication List - Protect others around you: Learn how to safely use, store and throw away your medicines at www.disposemymeds.org.          This list is accurate as of: 8/7/17 11:03 AM.  Always use your " most recent med list.                   Brand Name Dispense Instructions for use Diagnosis    clonazePAM 1 MG tablet    klonoPIN    90 tablet    Take 1 tablet (1 mg) by mouth 3 times daily as needed for anxiety Increased dose.    Postpartum anxiety       ibuprofen 800 MG tablet    ADVIL/MOTRIN    90 tablet    Take 1 tablet (800 mg) by mouth every 8 hours as needed for moderate pain     (spontaneous vaginal delivery)       medroxyPROGESTERone 150 MG/ML injection    DEPO-PROVERA    1 mL    Inject 1 mL (150 mg) into the muscle every 3 months    Depo-Provera contraceptive status, General counseling and advice on contraceptive management       traMADol 50 MG tablet    ULTRAM    90 tablet    Take 1 tablet (50 mg) by mouth every 8 hours as needed for pain    Hip pain, right, Acetabular labrum tear, right, subsequent encounter
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
## 2017-08-09 ENCOUNTER — TELEPHONE (OUTPATIENT)
Dept: INTERNAL MEDICINE | Facility: CLINIC | Age: 27
End: 2017-08-09

## 2017-08-09 NOTE — TELEPHONE ENCOUNTER
"Patient saw Carmenza Menard and was then referred to a psychiatrist Dr. Diana Santoro.  Patient states she does not want to continue seeing psychiatrist as she \"doesn't see eye to eye\" on things, felt she wasn't really listening to her and gets the impression that psychiatrist thinks pt is a drug seeker.     Yesterday pt saw Dr. Santoro and was prescribed Clonidine and Rexulti.  Patient states she will take the meds but doesn't want to keep adding meds on. Patient wants to know if PCP will take over prescibing psych meds or if she should go back and see Carmenza again.  ROSALES Lopez R.N.    "

## 2017-08-09 NOTE — TELEPHONE ENCOUNTER
Attempted to contact patient, no answer, left voice message to call back as message was generic voice message.

## 2017-08-09 NOTE — TELEPHONE ENCOUNTER
Would prefer she find a psych provider to see   There is a NP at [facility] who counsels and prescribes long term  I would try there   Carm[facility] is not a long term provider    [facility], [facility]. The phone number is [phone].

## 2017-08-10 ENCOUNTER — OFFICE VISIT (OUTPATIENT)
Dept: OBGYN | Facility: CLINIC | Age: 27
End: 2017-08-10
Payer: COMMERCIAL

## 2017-08-10 VITALS
DIASTOLIC BLOOD PRESSURE: 60 MMHG | TEMPERATURE: 98.2 F | WEIGHT: 144.5 LBS | SYSTOLIC BLOOD PRESSURE: 92 MMHG | BODY MASS INDEX: 24.8 KG/M2

## 2017-08-10 DIAGNOSIS — Z30.430 ENCOUNTER FOR IUD INSERTION: ICD-10-CM

## 2017-08-10 DIAGNOSIS — Z30.430 ENCOUNTER FOR IUD INSERTION: Primary | ICD-10-CM

## 2017-08-10 LAB — BETA HCG QUAL IFA URINE: NEGATIVE

## 2017-08-10 PROCEDURE — 84703 CHORIONIC GONADOTROPIN ASSAY: CPT | Performed by: ADVANCED PRACTICE MIDWIFE

## 2017-08-10 PROCEDURE — 87491 CHLMYD TRACH DNA AMP PROBE: CPT | Performed by: ADVANCED PRACTICE MIDWIFE

## 2017-08-10 PROCEDURE — 87591 N.GONORRHOEAE DNA AMP PROB: CPT | Performed by: ADVANCED PRACTICE MIDWIFE

## 2017-08-10 PROCEDURE — 58300 INSERT INTRAUTERINE DEVICE: CPT | Performed by: ADVANCED PRACTICE MIDWIFE

## 2017-08-10 NOTE — MR AVS SNAPSHOT
After Visit Summary   8/10/2017    Carolyn Jacobs    MRN: [id number]           Patient Information     Date Of Birth          1990        Visit Information        Provider Department      8/10/2017 1:00 PM Jadyn Acuna DEANGELO [facility]        Today's Diagnoses     Encounter for IUD insertion    -  1      Care Instructions    Your Intrauterine Device (IUD)     What to watch for right after IUD placement:      Some women may experience uterine cramps, bleeding, and/or dizziness during and right after IUD placement       To help minimize the cramps, you may take ibuprofen 800 mg with food prior to your appointment. These symptoms should improve over the next 24 hours.  Mild cramping may be present for a few days after IUD placement. Please continue taking the ibuprofen 800 mg with food three times a day for the next five days.      You may experience spotting or bleeding for the first few weeks after IUD placement      Use condoms or abstain from sex for 7 days after the insertion of your Mirena or Marley IUD      If you experience fever, abdominal pain, worsening pelvic pain, dizziness, unusually heavy vaginal bleeding, suspected expulsion of device or four smelling vaginal discharge please come to the clinic for evaluation      Please schedule an appointment at the clinic for a string check 4-6 weeks after IUD placement    Your periods may change (Marley/Mirena):      For the first 3 to 6 months, your monthly period may become irregular. You may also have frequent spotting or light bleeding. A few women have heavy bleeding during this time. After your body adjusts, the number of bleeding days is likely to decrease (but may remain irregular), and you may even find that your periods stop altogether for as long as Marley/Mirena is in place.  Your periods will return rapidly once the IUD is removed.      ParaGard IUD users:      ParaGard IUD users may experience heavier than normal  cycles while their IUD is in place, this is considered normal     Back-up contraception is not needed                Checking for your strings:      We encourage everyone with an IUD in place to check for their strings monthly      You may check your own IUD strings by inserting a finger into the vagina and feeling the strings as they exit the cervix.  The strings will initially feel firm, like fishing line, but will soften over a few weeks.  After the strings have softened, you or your partner should not be able to feel the strings during intercourse.       If the string length greatly changes or if you cannot feel your strings at all please make an appointment to see you midwife and use a backup method of contraception like a condom.      If you can feel something hard/plastic like the IUD may not be in the correct place. You should then see your healthcare provider to have the position confirmed with ultrasound.       Remember:    IUD's do not protect against HIV or STIs.  IUD's do not prevent the formation of ovarian cysts.  IUD's do not typically reduce acne or cause weight gain or mood changes.    For more information:  [email or web address]      If you have questions or concerns please call:    [facility]  [phone]            Follow-ups after your visit        Follow-up notes from your care team     Return in about 4 weeks (around 9/7/2017) for If unable to feel IUD strings.      Who to contact     If you have questions or need follow up information about today's clinic visit or your schedule please contact [facility] directly at [phone].  Normal or non-critical lab and imaging results will be communicated to you by MyChart, letter or phone within 4 business days after the clinic has received the results. If you do not hear from us within 7 days, please contact the clinic through MyChart or phone. If you have a critical or abnormal lab result, we will notify you by phone as  soon as possible.  Submit refill requests through [employer] or call your pharmacy and they will forward the refill request to us. Please allow 3 business days for your refill to be completed.          Additional Information About Your Visit        Humanoid Information     [employer] gives you secure access to your electronic health record. If you see a primary care provider, you can also send messages to your care team and make appointments. If you have questions, please call your primary care clinic.  If you do not have a primary care provider, please call [phone] and they will assist you.        Care EveryWhere ID     This is your Care EveryWhere ID. This could be used by other organizations to access your [place] medical records  [id number]        Your Vitals Were     Temperature Last Period BMI (Body Mass Index)             98.2  F (36.8  C) (Oral) 07/13/2017 24.8 kg/m2          Blood Pressure from Last 3 Encounters:   08/10/17 92/60   08/07/17 100/68   07/21/17 90/60    Weight from Last 3 Encounters:   08/10/17 144 lb 8 oz (65.5 kg)   08/07/17 148 lb (67.1 kg)   07/21/17 148 lb (67.1 kg)              We Performed the Following     Beta HCG qual IFA urine     CHLAMYDIA TRACHOMATIS PCR     INSERTION INTRAUTERINE DEVICE     NEISSERIA GONORRHOEA PCR          Today's Medication Changes          These changes are accurate as of: 8/10/17  1:26 PM.  If you have any questions, ask your nurse or doctor.               Start taking these medicines.        Dose/Directions    levonorgestrel 20 MCG/24HR IUD   Commonly known as:  MIRENA   Used for:  Encounter for IUD insertion        Dose:  1 each   1 each (20 mcg) by Intrauterine route once for 1 dose   Quantity:  1 each   Refills:  0         Stop taking these medicines if you haven't already. Please contact your care team if you have questions.     medroxyPROGESTERone 150 MG/ML injection   Commonly known as:  DEPO-PROVERA                Where to get your medicines       Some of these will need a paper prescription and others can be bought over the counter.  Ask your nurse if you have questions.     You don't need a prescription for these medications     levonorgestrel 20 MCG/24HR IUD                Primary Care Provider Office Phone # Fax #    JOSE Bonilla [facility] [phone]       [address] [facility] [address]        Equal Access to Services     KENNEY VERDUGO : Hadii aad ku hadasho Soomaali, waaxda luqadaha, qaybta kaalmada adeegyada, waxay idiin hayaan adeeg khjosesh lasuad . So [facility] [phone].    ATENCIÓN: Si habla espbhavana, tiene a redding disposición servicios gratuitos de asistencia lingüística. Llame al [phone].    We comply with applicable federal civil rights laws and [place] laws. We do not discriminate on the basis of race, color, national origin, age, disability sex, sexual orientation or gender identity.            Thank you!     Thank you for choosing [facility]  for your care. Our goal is always to provide you with excellent care. Hearing back from our patients is one way we can continue to improve our services. Please take a few minutes to complete the written survey that you may receive in the mail after your visit with us. Thank you!             Your Updated Medication List - Protect others around you: Learn how to safely use, store and throw away your medicines at www.disposemymeds.org.          This list is accurate as of: 8/10/17  1:26 PM.  Always use your most recent med list.                   Brand Name Dispense Instructions for use Diagnosis    clonazePAM 1 MG tablet    klonoPIN    90 tablet    Take 1 tablet (1 mg) by mouth 3 times daily as needed for anxiety Increased dose.    Postpartum anxiety       ibuprofen 800 MG tablet    ADVIL/MOTRIN    90 tablet    Take 1 tablet (800 mg) by mouth every 8 hours as needed for moderate pain     (spontaneous vaginal delivery)       levonorgestrel 20 MCG/24HR IUD     MIRENA    1 each    1 each (20 mcg) by Intrauterine route once for 1 dose    Encounter for IUD insertion       traMADol 50 MG tablet    ULTRAM    90 tablet    Take 1 tablet (50 mg) by mouth every 8 hours as needed for pain    Hip pain, right, Acetabular labrum tear, right, subsequent encounter

## 2017-08-10 NOTE — PATIENT INSTRUCTIONS
Your Intrauterine Device (IUD)     What to watch for right after IUD placement:      Some women may experience uterine cramps, bleeding, and/or dizziness during and right after IUD placement       To help minimize the cramps, you may take ibuprofen 800 mg with food prior to your appointment. These symptoms should improve over the next 24 hours.  Mild cramping may be present for a few days after IUD placement. Please continue taking the ibuprofen 800 mg with food three times a day for the next five days.      You may experience spotting or bleeding for the first few weeks after IUD placement      Use condoms or abstain from sex for 7 days after the insertion of your Mirena or Marley IUD      If you experience fever, abdominal pain, worsening pelvic pain, dizziness, unusually heavy vaginal bleeding, suspected expulsion of device or four smelling vaginal discharge please come to the clinic for evaluation      Please schedule an appointment at the clinic for a string check 4-6 weeks after IUD placement    Your periods may change (Marley/Mirena):      For the first 3 to 6 months, your monthly period may become irregular. You may also have frequent spotting or light bleeding. A few women have heavy bleeding during this time. After your body adjusts, the number of bleeding days is likely to decrease (but may remain irregular), and you may even find that your periods stop altogether for as long as Marley/Mirena is in place.  Your periods will return rapidly once the IUD is removed.      ParaGard IUD users:      ParaGard IUD users may experience heavier than normal cycles while their IUD is in place, this is considered normal     Back-up contraception is not needed                Checking for your strings:      We encourage everyone with an IUD in place to check for their strings monthly      You may check your own IUD strings by inserting a finger into the vagina and feeling the strings as they exit the cervix.  The strings  will initially feel firm, like fishing line, but will soften over a few weeks.  After the strings have softened, you or your partner should not be able to feel the strings during intercourse.       If the string length greatly changes or if you cannot feel your strings at all please make an appointment to see you midwife and use a backup method of contraception like a condom.      If you can feel something hard/plastic like the IUD may not be in the correct place. You should then see your healthcare provider to have the position confirmed with ultrasound.       Remember:    IUD's do not protect against HIV or STIs.  IUD's do not prevent the formation of ovarian cysts.  IUD's do not typically reduce acne or cause weight gain or mood changes.    For more information:  [email or web address] [email or web address]/      If you have questions or concerns please call:    Gina Ward  [phone]

## 2017-08-10 NOTE — PROGRESS NOTES
MIDWIFE IUD PLACEMENT NOTE    IUD type: Mirena  Lot #: [id number]  NDC#: [id number]    HPI:   Carolyn Jacobs is a 27 year old female here today for IUD insertion.  Patient's last menstrual period was 07/13/2017..  Today's pregnancy test - Negative  Patient has premedicated with Ibuprofen 800 mgs  Patient did not use Cytotec prior to IUD insertion  STD testing offered? accepted  Patient does not have any infections or cervicitis  Patient does not have history of liver problems or cancer.     Patient has been given written information.  I have reviewed the risks of the IUD including pregnancy, PID, life threatening infection, perforation, expulsion, cramping, changes in bleeding and ovarian cysts. Benefits of the IUD and alternative family planning methods have been discussed.  The probable mechanisms of action were covered, failure rates, spontaneous expulsion, the importance of checking the string monthly, as well as minor or nuisance side effects such as ovarian cysts, migraines, skin changes irregular and unpredictable bleeding in the first 6 months of use and bleeding patterns after the first 6 months.  The 's printed material was provided for her review.  Patients questions are answered.  Patient has verbalized understanding of risks and benefits and has signed the consent form.      Health maintenance updated:  yes    No Known Allergies  Current Outpatient Prescriptions   Medication Sig Dispense Refill     levonorgestrel (MIRENA) 20 MCG/24HR IUD 1 each (20 mcg) by Intrauterine route once for 1 dose 1 each 0     traMADol (ULTRAM) 50 MG tablet Take 1 tablet (50 mg) by mouth every 8 hours as needed for pain 90 tablet 0     clonazePAM (KLONOPIN) 1 MG tablet Take 1 tablet (1 mg) by mouth 3 times daily as needed for anxiety Increased dose. 90 tablet 1     ibuprofen (ADVIL/MOTRIN) 800 MG tablet Take 1 tablet (800 mg) by mouth every 8 hours as needed for moderate pain 90 tablet 1      Past Medical History:    Diagnosis Date     Abnormal Pap smear     HPV & normal      Anemia     not on iron vits, O+ blood type     Anxiety      Panic attacks      Varicosities      Family History   Problem Relation Age of Onset     Hypertension Mother      Neurologic Disorder Mother      migraine headache     Depression Sister      Depression Brother      CEREBROVASCULAR DISEASE Maternal Grandfather      Social History     Social History     Marital status: Single     Spouse name: N/A     Number of children: 2     Years of education: 11     Occupational History     student      Social History Main Topics     Smoking status: Current Some Day Smoker     Packs/day: 0.25     Years: 1.00     Types: Cigarettes     Last attempt to quit: 10/19/2014     Smokeless tobacco: Former User      Comment: 5 cigs per day     Alcohol use No     Drug use: No     Sexual activity: Yes     Partners: Male     Birth control/ protection: None     Other Topics Concern     Not on file     Social History Narrative     Past Surgical History:   Procedure Laterality Date     C INDUCED ABORTN BY DIL/EVAC  2005     wisdom teeth         REVIEW OF SYSTEMS:  C: NEGATIVE for fever, chills, change in weight  B: NEGATIVE for masses, tenderness or discharge  GI: NEGATIVE for nausea, abdominal pain, heartburn, or change in bowel habits  : NEGATIVE for unusual urinary or vaginal symptoms. Periods have been irregular on depo. LMP 07/13/17 with spotting ever since  H: NEGATIVE for bleeding problems  P: NEGATIVE for changes in mood or affect      EXAM:  BP 92/60 (BP Location: Left arm, Patient Position: Chair, Cuff Size: Adult Regular)  Temp 98.2  F (36.8  C) (Oral)  Wt 144 lb 8 oz (65.5 kg)  LMP 07/13/2017  BMI 24.8 kg/m2   Exam:  Constitutional: healthy, alert and no distress  Gastrointestinal: Abdomen soft, non-tender. No masses, organomegaly  Psychiatric: mentation appears normal and affect normal/bright  PELVIC EXAM:  Vulva: No external lesions, BUS WNL  Vagina: Moist,  pink, discharge normal  well rugated, no lesions  Cervix:, smooth, pink, no visible lesions, neg CMT  Uterus: Normal size, retroverted, non-tender, mobile  Ovaries: No mass, non-tender  Rectal exam: deferred      ASSESSMENT/ PLAN:  (Z30.430) Encounter for IUD insertion  (primary encounter diagnosis)  Comment: IUD inserted without difficulty  Plan: Beta HCG qual IFA urine, NEISSERIA GONORRHOEA         PCR, CHLAMYDIA TRACHOMATIS PCR, INSERTION         INTRAUTERINE DEVICE, levonorgestrel (MIRENA) 20        MCG/24HR IUD            Procedure:  Uterus assessed for position and is retroverted.  Sterile speculum inserted.  Betadine prep of cervix done.  Tenaculum applied at 10 and 2 o'clock.  Uterine sounded to 7 cm's.  Cervical dilators not used.   IUD inserted in the usual fashion without difficulty.  Tenaculum removed with scant bleeding from the cervix.  Strings trimmed to 3 cm's.  Patient tolerated the procedure well        COUNSELING:  Verbal and written instructions given to patient regarding checking IUD strings. If unable to feel strings, needs to come back for IUD check within four weeks.  Advised patient take ibuprofen 800mg PO TID for at least next 5 days.  Reviewed warning signs of fever, sharp/severe abdominal pain, reassured it can be normal to have menstrual like cramping after placement and spotting/light bleeding may last a few weeks after placement.    Reviewed with patient that the IUD needs to be replaced in 5 years, nothing in vagina for 2 days following placement of Mirena or Marley IUD's.  Use b/u method for one week following IUD placement.        JOSE Woo, CNM

## 2017-08-11 LAB
C TRACH DNA SPEC QL NAA+PROBE: NORMAL
N GONORRHOEA DNA SPEC QL NAA+PROBE: NORMAL
SPECIMEN SOURCE: NORMAL
SPECIMEN SOURCE: NORMAL

## 2017-08-15 DIAGNOSIS — M25.551 HIP PAIN, RIGHT: ICD-10-CM

## 2017-08-15 DIAGNOSIS — S73.191D ACETABULAR LABRUM TEAR, RIGHT, SUBSEQUENT ENCOUNTER: ICD-10-CM

## 2017-08-15 RX ORDER — TRAMADOL HYDROCHLORIDE 50 MG/1
50 TABLET ORAL EVERY 8 HOURS PRN
Qty: 90 TABLET | Refills: 0 | Status: SHIPPED | OUTPATIENT
Start: 2017-08-15 | End: 2017-10-02

## 2017-08-15 NOTE — TELEPHONE ENCOUNTER
Tramadol      Last Written Prescription Date:  7/21/17  Last Fill Quantity: 90,   # refills: 0  Last Office Visit with [facility] prescribing provider: 7/21/7  Future Office visit:       Routing refill request to provider for review/approval because:  Drug not on the [facility] refill protocol or controlled substance

## 2017-08-25 ENCOUNTER — TELEPHONE (OUTPATIENT)
Dept: INTERNAL MEDICINE | Facility: CLINIC | Age: 27
End: 2017-08-25

## 2017-08-25 NOTE — TELEPHONE ENCOUNTER
Call from [facility]   Azucena NP  She saw patient and patient wanted to increase Klonopin dose from 1 mg TID, and provider is not willing to do that   Patient is not willing to do other medication   Patient may decline to continue seeing her    We need to let Carolyn know that she will have to find another provider who will do her medication, ongoing, as 2 psych providers have not felt this was appropriate to continue the Klonopin long term   If she wants to continue taking this medication, she will have to find a provider willing to do this. I am not willing to do this if 2 psych providers are against it

## 2017-08-28 NOTE — TELEPHONE ENCOUNTER
Patient states she will be making an appt with PCP to come in and discuss weaning off Klonopin and discuss other meds to replace it.  She probably will not come in for 2 weeks when her 3 year old starts his school as she does not have childcare.  ROSALES Lopez R.N.

## 2017-09-05 ENCOUNTER — TELEPHONE (OUTPATIENT)
Dept: INTERNAL MEDICINE | Facility: CLINIC | Age: 27
End: 2017-09-05

## 2017-09-05 DIAGNOSIS — M25.551 HIP PAIN, RIGHT: Primary | ICD-10-CM

## 2017-09-05 RX ORDER — CYCLOBENZAPRINE HCL 10 MG
5-10 TABLET ORAL 3 TIMES DAILY PRN
Qty: 30 TABLET | Refills: 1 | Status: SHIPPED | OUTPATIENT
Start: 2017-09-05 | End: 2017-09-07

## 2017-09-05 NOTE — TELEPHONE ENCOUNTER
"Pt calls, she spoke to our office last week that she needs to find a new psychiatrist to discuss continuing Clonazepam as 2 psychiatrists did not feel this was appropriat. Pt has seen several psychiatrist and asks if we have the names of the providers who will not continue this - she doesn't want to incorrectly schedule with a provider who has declined to fill this. Reviewed chart and informed pt they were ROSA Cross with [facility] and Carmenza Menard NP with [facility].    Pt also reports Tramadol is making her feel \"loopy\" and asking if possible to try a muscle relaxant to help with hip pain. Pt is willing to make an appt to discuss if needed. Please advise.   "

## 2017-09-06 ENCOUNTER — TELEPHONE (OUTPATIENT)
Dept: INTERNAL MEDICINE | Facility: CLINIC | Age: 27
End: 2017-09-06

## 2017-09-06 NOTE — TELEPHONE ENCOUNTER
Reason for Call:  Other appointment    Detailed comments: pt is calling about appt on 9/8-has been prescribed muscle relaxants by PCP and is wondering if she still needs that appt to follow up or if she can cancel. Has found relief from the RX    Phone Number Patient can be reached at: Cell number on file:    Telephone Information:   Mobile [phone]       Best Time:     Can we leave a detailed message on this number? YES    Call taken on 9/6/2017 at 12:25 PM by Shira Flynn

## 2017-09-07 ENCOUNTER — TELEPHONE (OUTPATIENT)
Dept: INTERNAL MEDICINE | Facility: CLINIC | Age: 27
End: 2017-09-07

## 2017-09-07 DIAGNOSIS — M25.551 HIP PAIN, RIGHT: ICD-10-CM

## 2017-09-07 RX ORDER — CYCLOBENZAPRINE HCL 10 MG
5-10 TABLET ORAL 3 TIMES DAILY PRN
Qty: 90 TABLET | Refills: 5 | Status: SHIPPED | OUTPATIENT
Start: 2017-09-07 | End: 2018-01-24

## 2017-09-07 NOTE — TELEPHONE ENCOUNTER
Patient states that the Cyclobenzaprine is helping with her hip pain if taking 1 tab three times daily.  She never tried taking 1/2 tab as she felt going from Tramadol to muscle relaxer a half tab would not be effective.  She now wants a larger quantity of the Cyclobenzaprine so she can use 1 tab three times a day like she was using the Tramadol.  Wants at least 60 but 90 would be better.  ROSALES Lopez R.N.

## 2017-10-02 ENCOUNTER — TELEPHONE (OUTPATIENT)
Dept: INTERNAL MEDICINE | Facility: CLINIC | Age: 27
End: 2017-10-02

## 2017-10-02 DIAGNOSIS — M25.551 HIP PAIN, RIGHT: ICD-10-CM

## 2017-10-02 DIAGNOSIS — S73.191D ACETABULAR LABRUM TEAR, RIGHT, SUBSEQUENT ENCOUNTER: ICD-10-CM

## 2017-10-02 NOTE — TELEPHONE ENCOUNTER
Patient called stating that Flexeril is not helping with pain management and stated she would like a refill of tramadol.    PCP please advise  Thank you,  CONCHITA Funes

## 2017-10-02 NOTE — TELEPHONE ENCOUNTER
Patient calling checking on the status of her Tramadol request.     Signed CSA form in chart.    Tramadol      Last Written Prescription Date:  8/15/17  Last Fill Quantity: 90,   # refills: 0  Last Office Visit with [facility] prescribing provider: 7/21/17  Future Office visit:       Routing refill request to provider for review/approval because:  Drug not on the [facility] refill protocol or controlled substance.    Provider please review and advise. Thank you.

## 2017-10-03 RX ORDER — TRAMADOL HYDROCHLORIDE 50 MG/1
50 TABLET ORAL EVERY 8 HOURS PRN
Qty: 90 TABLET | Refills: 0 | Status: SHIPPED | OUTPATIENT
Start: 2017-10-03 | End: 2017-11-28

## 2017-10-03 NOTE — TELEPHONE ENCOUNTER
Tramadol rx taken down to [facility].  Patient advised.  ROSALES Lopez R.N.

## 2017-11-03 DIAGNOSIS — F41.1 GENERALIZED ANXIETY DISORDER: Primary | ICD-10-CM

## 2017-11-03 LAB
FOLATE SERPL-MCNC: 17.6 NG/ML
T3 SERPL-MCNC: 93 NG/DL (ref 60–181)

## 2017-11-03 PROCEDURE — 36415 COLL VENOUS BLD VENIPUNCTURE: CPT

## 2017-11-03 PROCEDURE — 84439 ASSAY OF FREE THYROXINE: CPT

## 2017-11-03 PROCEDURE — 84480 ASSAY TRIIODOTHYRONINE (T3): CPT

## 2017-11-03 PROCEDURE — 82746 ASSAY OF FOLIC ACID SERUM: CPT

## 2017-11-03 PROCEDURE — 84443 ASSAY THYROID STIM HORMONE: CPT

## 2017-11-03 PROCEDURE — 82306 VITAMIN D 25 HYDROXY: CPT

## 2017-11-04 LAB
T4 FREE SERPL-MCNC: 0.83 NG/DL (ref 0.76–1.46)
TSH SERPL DL<=0.005 MIU/L-ACNC: 1.7 MU/L (ref 0.4–4)

## 2017-11-06 LAB — DEPRECATED CALCIDIOL+CALCIFEROL SERPL-MC: 24 UG/L (ref 20–75)

## 2017-11-28 DIAGNOSIS — M25.551 HIP PAIN, RIGHT: ICD-10-CM

## 2017-11-28 DIAGNOSIS — S73.191D ACETABULAR LABRUM TEAR, RIGHT, SUBSEQUENT ENCOUNTER: ICD-10-CM

## 2017-11-28 RX ORDER — TRAMADOL HYDROCHLORIDE 50 MG/1
50 TABLET ORAL EVERY 8 HOURS PRN
Qty: 90 TABLET | Refills: 0 | Status: SHIPPED | OUTPATIENT
Start: 2017-11-28 | End: 2018-01-24

## 2017-11-28 NOTE — TELEPHONE ENCOUNTER
Pt asking for refill on:    Tramadol      Last Written Prescription Date:  10-3-17  Last Fill Quantity: 90,   # refills: 0  Last Office Visit: 7-21-17  Future Office visit:       Routing refill request to provider for review/approval because:  Drug not on the [facility], [facility] or  Health refill protocol or controlled substance    Signed CSA form in chart.    [place]  reviewed- no concerns    [place]  [email or web address]      Please advise, thanks.

## 2018-01-24 ENCOUNTER — OFFICE VISIT (OUTPATIENT)
Dept: INTERNAL MEDICINE | Facility: CLINIC | Age: 28
End: 2018-01-24
Payer: COMMERCIAL

## 2018-01-24 VITALS
HEART RATE: 80 BPM | BODY MASS INDEX: 25.1 KG/M2 | OXYGEN SATURATION: 97 % | WEIGHT: 147 LBS | TEMPERATURE: 97.3 F | RESPIRATION RATE: 12 BRPM | DIASTOLIC BLOOD PRESSURE: 70 MMHG | SYSTOLIC BLOOD PRESSURE: 110 MMHG | HEIGHT: 64 IN

## 2018-01-24 DIAGNOSIS — F41.8 POSTPARTUM ANXIETY: ICD-10-CM

## 2018-01-24 DIAGNOSIS — S73.191D ACETABULAR LABRUM TEAR, RIGHT, SUBSEQUENT ENCOUNTER: ICD-10-CM

## 2018-01-24 DIAGNOSIS — M25.551 HIP PAIN, RIGHT: ICD-10-CM

## 2018-01-24 PROCEDURE — 99213 OFFICE O/P EST LOW 20 MIN: CPT | Performed by: NURSE PRACTITIONER

## 2018-01-24 RX ORDER — TRAMADOL HYDROCHLORIDE 50 MG/1
50 TABLET ORAL EVERY 8 HOURS PRN
Qty: 90 TABLET | Refills: 0 | Status: SHIPPED | OUTPATIENT
Start: 2018-01-24 | End: 2018-02-27

## 2018-01-24 RX ORDER — TRAMADOL HYDROCHLORIDE 50 MG/1
50 TABLET ORAL EVERY 8 HOURS PRN
Qty: 90 TABLET | Refills: 0 | Status: SHIPPED | OUTPATIENT
Start: 2018-01-24 | End: 2018-01-24

## 2018-01-24 RX ORDER — CLONAZEPAM 1 MG/1
1 TABLET ORAL
Qty: 30 TABLET | Refills: 3 | Status: SHIPPED | OUTPATIENT
Start: 2018-01-24 | End: 2018-05-18

## 2018-01-24 RX ORDER — CLONAZEPAM 1 MG/1
1 TABLET ORAL
Qty: 30 TABLET | Refills: 3 | Status: SHIPPED | OUTPATIENT
Start: 2018-01-24 | End: 2018-01-24

## 2018-01-24 ASSESSMENT — PATIENT HEALTH QUESTIONNAIRE - PHQ9
SUM OF ALL RESPONSES TO PHQ QUESTIONS 1-9: 10
SUM OF ALL RESPONSES TO PHQ QUESTIONS 1-9: 10
10. IF YOU CHECKED OFF ANY PROBLEMS, HOW DIFFICULT HAVE THESE PROBLEMS MADE IT FOR YOU TO DO YOUR WORK, TAKE CARE OF THINGS AT HOME, OR GET ALONG WITH OTHER PEOPLE: EXTREMELY DIFFICULT

## 2018-01-24 ASSESSMENT — ANXIETY QUESTIONNAIRES
5. BEING SO RESTLESS THAT IT IS HARD TO SIT STILL: SEVERAL DAYS
7. FEELING AFRAID AS IF SOMETHING AWFUL MIGHT HAPPEN: NEARLY EVERY DAY
2. NOT BEING ABLE TO STOP OR CONTROL WORRYING: NEARLY EVERY DAY
3. WORRYING TOO MUCH ABOUT DIFFERENT THINGS: NEARLY EVERY DAY
GAD7 TOTAL SCORE: 18
GAD7 TOTAL SCORE: 18
6. BECOMING EASILY ANNOYED OR IRRITABLE: MORE THAN HALF THE DAYS
4. TROUBLE RELAXING: NEARLY EVERY DAY
1. FEELING NERVOUS, ANXIOUS, OR ON EDGE: NEARLY EVERY DAY
GAD7 TOTAL SCORE: 18
7. FEELING AFRAID AS IF SOMETHING AWFUL MIGHT HAPPEN: NEARLY EVERY DAY

## 2018-01-24 NOTE — NURSING NOTE
"Chief Complaint   Patient presents with     Recheck Medication       Initial /70 (BP Location: Left arm, Patient Position: Chair, Cuff Size: Adult Regular)  Pulse 80  Temp 97.3  F (36.3  C) (Oral)  Resp 12  Ht 5' 4\" (1.626 m)  Wt 147 lb (66.7 kg)  LMP 01/24/2018  SpO2 97%  BMI 25.23 kg/m2 Estimated body mass index is 25.23 kg/(m^2) as calculated from the following:    Height as of this encounter: 5' 4\" (1.626 m).    Weight as of this encounter: 147 lb (66.7 kg).  Medication Reconciliation: complete     ALOK Barnhart      "

## 2018-01-24 NOTE — MR AVS SNAPSHOT
"              After Visit Summary   1/24/2018    Carolyn Jacobs    MRN: [id number]           Patient Information     Date Of Birth          1990        Visit Information        Provider Department      1/24/2018 2:40 PM Arleen Napier APRN CNP [facility]        Today's Diagnoses     Postpartum anxiety        Hip pain, right        Acetabular labrum tear, right, subsequent encounter          Care Instructions    refill on medication           Follow-ups after your visit        Who to contact     If you have questions or need follow up information about today's clinic visit or your schedule please contact [facility] directly at [phone].  Normal or non-critical lab and imaging results will be communicated to you by [employer], letter or phone within 4 business days after the clinic has received the results. If you do not hear from us within 7 days, please contact the clinic through [employer] or phone. If you have a critical or abnormal lab result, we will notify you by phone as soon as possible.  Submit refill requests through [employer] or call your pharmacy and they will forward the refill request to us. Please allow 3 business days for your refill to be completed.          Additional Information About Your Visit        MyChart Information     [employer] gives you secure access to your electronic health record. If you see a primary care provider, you can also send messages to your care team and make appointments. If you have questions, please call your primary care clinic.  If you do not have a primary care provider, please call [phone] and they will assist you.        Care EveryWhere ID     This is your Care EveryWhere ID. This could be used by other organizations to access your [place] medical records  [id number]        Your Vitals Were     Pulse Temperature Respirations Height Last Period Pulse Oximetry    80 97.3  F (36.3  C) (Oral) 12 1.626 m (5' 4\") 01/24/2018 " 97%    BMI (Body Mass Index)                   25.23 kg/m2            Blood Pressure from Last 3 Encounters:   01/24/18 110/70   08/10/17 92/60   08/07/17 100/68    Weight from Last 3 Encounters:   01/24/18 66.7 kg (147 lb)   08/10/17 65.5 kg (144 lb 8 oz)   08/07/17 67.1 kg (148 lb)              Today, you had the following     No orders found for display         Today's Medication Changes          These changes are accurate as of 1/24/18  3:28 PM.  If you have any questions, ask your nurse or doctor.               Start taking these medicines.        Dose/Directions    clonazePAM 1 MG tablet   Commonly known as:  klonoPIN   Used for:  Postpartum anxiety   Started by:  Arleen Napier APRN CNP        Dose:  1 mg   Take 1 tablet (1 mg) by mouth nightly as needed for anxiety Increased dose.   Quantity:  30 tablet   Refills:  3       traMADol 50 MG tablet   Commonly known as:  ULTRAM   Used for:  Hip pain, right, Acetabular labrum tear, right, subsequent encounter   Started by:  Arleen Napier APRN CNP        Dose:  50 mg   Take 1 tablet (50 mg) by mouth every 8 hours as needed for pain   Quantity:  90 tablet   Refills:  0            Where to get your medicines      Some of these will need a paper prescription and others can be bought over the counter.  Ask your nurse if you have questions.     Bring a paper prescription for each of these medications     clonazePAM 1 MG tablet    traMADol 50 MG tablet                Primary Care Provider Office Phone # Fax #    JOSE Bonilla [facility] [phone]       [address] [facility] [address]        Equal Access to Services     [facility] AH: Hadii antelmo alexis Soelen, waaxda luqadaha, qaybta kaalmada adejudith, russel martinez. So [facility] [phone].    ATENCIÓN: Si habla español, tiene a redding disposición servicios gratuitos de asistencia lingüística. Llame al [phone].    We comply with applicable federal  civil rights laws and [place] laws. We do not discriminate on the basis of race, color, national origin, age, disability, sex, sexual orientation, or gender identity.            Thank you!     Thank you for choosing [facility]  for your care. Our goal is always to provide you with excellent care. Hearing back from our patients is one way we can continue to improve our services. Please take a few minutes to complete the written survey that you may receive in the mail after your visit with us. Thank you!             Your Updated Medication List - Protect others around you: Learn how to safely use, store and throw away your medicines at www.disposemymeds.org.          This list is accurate as of 18  3:28 PM.  Always use your most recent med list.                   Brand Name Dispense Instructions for use Diagnosis    clonazePAM 1 MG tablet    klonoPIN    30 tablet    Take 1 tablet (1 mg) by mouth nightly as needed for anxiety Increased dose.    Postpartum anxiety       ibuprofen 800 MG tablet    ADVIL/MOTRIN    90 tablet    Take 1 tablet (800 mg) by mouth every 8 hours as needed for moderate pain     (spontaneous vaginal delivery)       levonorgestrel 20 MCG/24HR IUD    MIRENA    1 each    1 each (20 mcg) by Intrauterine route once for 1 dose    Encounter for IUD insertion       traMADol 50 MG tablet    ULTRAM    90 tablet    Take 1 tablet (50 mg) by mouth every 8 hours as needed for pain    Hip pain, right, Acetabular labrum tear, right, subsequent encounter

## 2018-01-24 NOTE — PROGRESS NOTES
Answers for HPI/ROS submitted by the patient on 1/24/2018   If you checked off any problems, how difficult have these problems made it for you to do your work, take care of things at home, or get along with other people?: Extremely difficult  PHQ9 TOTAL SCORE: 10  REILLY 7 TOTAL SCORE: 18    SUBJECTIVE:   Carolyn Jacobs is a 27 year old female who presents to clinic today for the following health issues:    Follow up for anxiety   Seeing psych   Psych has her on clonazepam - only does 15 tabs a month   Was previously on 90 a day and decreased to 30 a month   Anxiety attack at night really bad   Wants 30 per month     Has had multiple break in in past in house - cannot sleep - heart racing     SSRI dull her down - tried many different medication in past   Not willing to try any more     Tramadol prn  Unable to have surgery related to her children and time she would need to recover- using prn           Problem list and histories reviewed & adjusted, as indicated.  Additional history: as documented    Patient Active Problem List   Diagnosis     CARDIOVASCULAR SCREENING; LDL GOAL LESS THAN 160     Acetabular labrum tear     Migraine     REILLY (generalized anxiety disorder)     Severe episode of recurrent major depressive disorder, without psychotic features (H)     Attention disturbance     Exercise-induced asthma     Current smoker     Borderline personality disorder     Controlled substance agreement signed     Mirena IUD insertion     Past Surgical History:   Procedure Laterality Date     C INDUCED ABORTN BY DIL/EVAC  2005     wisdom teeth         Social History   Substance Use Topics     Smoking status: Current Some Day Smoker     Packs/day: 0.25     Years: 1.00     Types: Cigarettes     Last attempt to quit: 10/19/2014     Smokeless tobacco: Former User      Comment: 5 cigs per day     Alcohol use No     Family History   Problem Relation Age of Onset     Hypertension Mother      Neurologic Disorder Mother       "migraine headache     Depression Sister      Depression Brother      CEREBROVASCULAR DISEASE Maternal Grandfather            Reviewed and updated as needed this visit by clinical staff  Tobacco  Allergies  Meds  Soc Hx      Reviewed and updated as needed this visit by Provider         ROS:  Constitutional, HEENT, cardiovascular, pulmonary, GI, , musculoskeletal, neuro, skin, endocrine and psych systems are negative, except as otherwise noted.    OBJECTIVE:     /70 (BP Location: Left arm, Patient Position: Chair, Cuff Size: Adult Regular)  Pulse 80  Temp 97.3  F (36.3  C) (Oral)  Resp 12  Ht 5' 4\" (1.626 m)  Wt 147 lb (66.7 kg)  LMP 01/24/2018  SpO2 97%  BMI 25.23 kg/m2  Body mass index is 25.23 kg/(m^2).  GENERAL: alert and no distress- here with son and daughter   RESP: lungs clear   CV: regular rate and rhythm  MS: no gross musculoskeletal defects noted, no edema  NEURO: Normal strength and tone, mentation intact and speech normal  PSYCH: mentation appears normal, affect normal/bright    Diagnostic Test Results:  PHQ-9 SCORE 6/15/2017 7/13/2017 1/24/2018   Total Score - - -   Total Score MyChart - - 10 (Moderate depression)   Total Score 27 27 10     REILLY-7 SCORE 6/15/2017 7/13/2017 1/24/2018   Total Score - - -   Total Score - - 18 (severe anxiety)   Total Score 21 21 18           ASSESSMENT/PLAN:             1. Postpartum anxiety    - clonazePAM (KLONOPIN) 1 MG tablet; Take 1 tablet (1 mg) by mouth nightly as needed for anxiety Increased dose.  Dispense: 30 tablet; Refill: 3    2. Hip pain, right    - traMADol (ULTRAM) 50 MG tablet; Take 1 tablet (50 mg) by mouth every 8 hours as needed for pain  Dispense: 90 tablet; Refill: 0    3. Acetabular labrum tear, right, subsequent encounter    - traMADol (ULTRAM) 50 MG tablet; Take 1 tablet (50 mg) by mouth every 8 hours as needed for pain  Dispense: 90 tablet; Refill: 0    Patient Instructions   refill on medication       JOSE Bonilla " [facility]

## 2018-01-25 ASSESSMENT — ANXIETY QUESTIONNAIRES: GAD7 TOTAL SCORE: 18

## 2018-01-25 ASSESSMENT — PATIENT HEALTH QUESTIONNAIRE - PHQ9: SUM OF ALL RESPONSES TO PHQ QUESTIONS 1-9: 10

## 2018-02-12 ENCOUNTER — HOSPITAL ENCOUNTER (EMERGENCY)
Facility: CLINIC | Age: 28
Discharge: HOME | End: 2018-02-12
Attending: EMERGENCY MEDICINE | Admitting: EMERGENCY MEDICINE
Payer: COMMERCIAL

## 2018-02-12 VITALS
RESPIRATION RATE: 16 BRPM | SYSTOLIC BLOOD PRESSURE: 104 MMHG | OXYGEN SATURATION: 100 % | DIASTOLIC BLOOD PRESSURE: 78 MMHG | HEART RATE: 87 BPM | TEMPERATURE: 97.9 F

## 2018-02-12 DIAGNOSIS — S90.31XA CONTUSION OF RIGHT FOOT, INITIAL ENCOUNTER: ICD-10-CM

## 2018-02-12 DIAGNOSIS — S93.601A SPRAIN OF RIGHT FOOT, INITIAL ENCOUNTER: ICD-10-CM

## 2018-02-12 PROCEDURE — 25000132 ZZH RX MED GY IP 250 OP 250 PS 637: Performed by: EMERGENCY MEDICINE

## 2018-02-12 PROCEDURE — 99283 EMERGENCY DEPT VISIT LOW MDM: CPT

## 2018-02-12 RX ORDER — IBUPROFEN 600 MG/1
600 TABLET, FILM COATED ORAL ONCE
Status: COMPLETED | OUTPATIENT
Start: 2018-02-12 | End: 2018-02-12

## 2018-02-12 RX ADMIN — IBUPROFEN 600 MG: 600 TABLET ORAL at 13:24

## 2018-02-12 ASSESSMENT — ENCOUNTER SYMPTOMS
ARTHRALGIAS: 1
BACK PAIN: 1

## 2018-02-12 NOTE — ED PROVIDER NOTES
History     Chief Complaint:  Ankle pain     HPI:   The history is provided by the patient.      Carolyn Jacobs is a 27 year old female who presents to the emergency department for evaluation of ankle pain. The patient reports that she was working out at the [employer] today and while on an elliptical machine she got her right foot stuck in it. This caused her foot to bend forward past her normal ROM for plantarflexion, which resulted in pain to her ankle with mildly associated lower back pain. She presents today concerned about any fracture. She says the pain is exacerbated with weightbearing at a 7/10 in severity and when sitting it is a 4/10 in severity. She does endorse having an acetabular labrum tear. She has no other complaints.     Allergies:  No known drug allergies     Medications:    Klonopin   Ultram   Mirena     Past Medical History:    Abnormal pap smear   Anemia  Anxiety   Mirena IUD   Panic attacks   Varicosities  Acetabular labrum tear   Exercise induced asthma     Past Surgical History:    Induced    [place] teeth     Family History:    HTN -Mother   Migraines - mother     Social History:  Presents with daughter in arms    Tobacco use: 0.25 PPD   Former smokeless tobacco user   Alcohol use: No   PCP: Arleen Napier    Marital Status:  Single     Review of Systems   Musculoskeletal: Positive for arthralgias and back pain. Negative for gait problem.   All other systems reviewed and are negative.    Physical Exam     Patient Vitals for the past 24 hrs:   BP Temp Temp src Pulse Heart Rate Resp SpO2   18 1314 104/78 - - 87 - 16 100 %   18 1119 116/76 97.9  F (36.6  C) Oral - 102 16 97 %        Physical Exam  HEENT:  mmm  Neck: Supple  CV: Peripheral pulses in tact and regular  Resp: Speaking in full sentences without any respiratory distress  Back: There is right lumbar paraspinal tenderness  Ext:  Right ankle and foot     No TTP over medial malleolus  No TTP over lateral  malleolus  No TTP base of 5th MT  No TTP fibular head  There is mild bony tenderness over the medial mid-foot bones.  No notable soft tissue swelling present  This is a closed injury  She is able to fire foot/toe flexors and extensors, though this causes pain.  Sensation and perfusion intact throughout foot  She is able to bear weight     Remainder of the skeletal survey is unremarkable     Skin: warm dry well perfused  Neuro: Alert, no gross motor or sensory deficits, gait stable    Emergency Department Course     Interventions:  1324: Ibuprofen 600 mg PO      Emergency Department Course:  Past medical records, nursing notes, and vitals reviewed.  1313: I performed an exam of the patient and obtained history, as documented above.     Above interventions provided.      Patient mentioned to the nurse that she did not want to receive an XR. Rather, she presented hoping to get a splint and go home.     1332: I rechecked the patient.  Discussed X-ray and had a shared decision making discussion with the patient, at the end of which we decided not to image her foot. Findings and plan explained to the Patient. Patient discharged home with instructions regarding supportive care, medications, and reasons to return. The importance of close follow-up was reviewed.      Impression & Plan      Medical Decision Making:  Carolyn Jacobs is a 27 year old female in room 25. Patient presents to the ER due to a foot injury Please see the HPI and exam for specifics. The patient did have some bony tenderness over the medial mid foot bones. She was able to walk though she notes that doing that and plantar flexing her foot when she drives does make her foot hurt more. There is not any notable bruising or swelling. I discussed an XR with the patient, though she would like to try more conservative treatment such as an ace wrap and over the counter medication. I have encouraged her to follow closely with her primary clinic in the outpatient  setting and certainly return if she has any worsening symptoms. Anticipatory guidance given prior to discharge.     Diagnosis:    ICD-10-CM    1. Contusion of right foot, initial encounter S90.31XA    2. Sprain of right foot, initial encounter S93.601A        Disposition:  Discharged to home    Scribe Disclosure:   IJunior, am serving as a scribe at 1:13 PM on 2/12/2018 to document services personally performed by Shane Beck DO based on my observations and the provider's statements to me.       Junior Jalloh  2/12/2018   [facility] EMERGENCY DEPARTMENT       Shane Beck DO  02/12/18 2118

## 2018-02-12 NOTE — ED NOTES
Patient presents with right ankle injury. Patient was at the [facility] and was on the elliptical, and got her foot caught. Patient states pain in her right ankle and back. Patient can walk but is having increased pain with walking and driving. ABCDs intact, alert and oriented x 4.

## 2018-02-12 NOTE — ED AVS SNAPSHOT
[facility]    [address]    [facility] [address]    Phone:  [phone]    Fax:  [phone]                                       Carolyn Jacobs   MRN: [id number]    Department:  [facility]   Date of Visit:  2/12/2018           Patient Information     Date Of Birth          1990        Your diagnoses for this visit were:     Contusion of right foot, initial encounter     Sprain of right foot, initial encounter        You were seen by Shane Beck DO.      Follow-up Information     Follow up with Arleen Napier APRN CNP. Call today.    Specialty:  Nurse Practitioner - Family    Why:  To schedule a follow up appointment    Contact information:    [address] [facility] [address]  [phone]          Discharge Instructions         ACE Wrap  Minor muscle or joint injuries are often treated with an elastic bandage. The bandage provides support and compression to the injured area. An elastic bandage is a stretchy, rolled bandage. Elastic bandages range in width from 2 to 6 inches. They can be used for a variety of injuries. The bandages are often called ACE bandages, after the most common brand name.  If used correctly, elastic bandages help control swelling and ease pain. An elastic bandage is also a good reminder not to overuse the injured area. However, elastic bandages do not provide a lot of support and will not prevent reinjury.  Home care    To apply an elastic bandage:    Check the skin before wrapping the injury. It should be clean, dry, and free of drainage.    Start wrapping below the injury and work your way toward the body. For an ankle sprain, start wrapping around the foot and work up toward the calf. This will help control swelling.    Overlap the edges of the bandage so it stays snuggly in place.    Wrap the bandage firmly, but not too tightly. A tight bandage can increase swelling on  either end of the bandage. Make sure the bandage is wrinkle free.    Leave fingers and toes exposed.    Secure ends of the bandage (even self-sticking ones) with clips or tape.    Check frequently to ensure adequate circulation, especially in the fingers and toes. Loosen the bandage if there is local swelling, numbness, tingling, discomfort, coldness, or discoloration (skin pale or bluish in color).    Rewrap the bandage as needed during the day. Reroll the bandage as you unwind it.  Continue using the elastic bandage until the pain and swelling are gone or as your healthcare provider advises.  If you have been told to ice the area, the ice can be secured in place with the elastic bandage. Wrap the ice pack with a thin towel to protect the skin. Do not put ice or an ice pack directly on the skin.  Ice the area for no more than 20 minutes at a time.    Follow-up care  Follow up with your healthcare provider, as advised.  When to seek medical advice  Call your healthcare provider for any of the following:    Pain and swelling that doesn't get better or gets worse    Trouble moving injured area    Skin discoloration, numbness, or tingling that doesn t go away after bandage is removed  Date Last Reviewed: 9/13/2015 2000-2017 [employer]. [address] [place] [address]. All rights reserved. This information is not intended as a substitute for professional medical care. Always follow your healthcare professional's instructions.          Bruises (Contusions)    A contusion is a bruise. A bruise happens when a blow to your body doesn't break the skin but does break blood vessels beneath the skin. Blood leaking from the broken vessels causes redness and swelling. As it heals, your bruise is likely to turn colors like purple, green, and yellow. This is normal. The bruise should fade in 2 or 3 weeks.  Factors that make you more likely to bruise  Almost everyone bruises now and then. Certain people  do bruise more easily than others. You're more prone to bruising as you get older. That's because blood vessels become more fragile with age. You're also more likely to bruise if you have a clotting disorder such as hemophilia or take medications that reduce clotting, including aspirin, coumadin, newer agents.  When to go to the emergency room (ER)  Bruises almost always heal on their own without special treatment. But for some people, a bad bruise can be serious. Seek medical care if you:    Have a clotting disorder such as hemophilia.    Have cirrhosis or other serious liver disease.    Take blood-thinning medications such as warfarin (Coumadin).  What to expect in the ER  A doctor will examine your bruise and ask about any health conditions you have. In some cases, you may have a test to check how well your blood clots. Other treatment will depend on your needs.  Follow-up care  Sometimes a bruise gets worse instead of better. It may become larger and more swollen. This can occur when your body walls off a small pool of blood under the skin (hematoma). In very rare cases, your doctor may need to drain excess blood from the area.  Tip:  Apply an ice pack or bag of frozen peas to a bruise (keep a thin cloth between the cold source and your skin). This can help reduce redness and swelling.   Date Last Reviewed: 12/1/2016 2000-2017 [employer]. [address] [place] [address]. All rights reserved. This information is not intended as a substitute for professional medical care. Always follow your healthcare professional's instructions.          Foot Sprain    A sprain is a stretching or tearing of the ligaments that hold a joint together. There are no broken bones. Sprains generally take from 3-6 weeks to heal. A sprain may be treated with a splint, walking cast, or special boot. Mild sprains may not need any additional support.  Home care  The following guidelines will help you care for your  injury at home:    Keep your leg elevated when sitting or lying down. This is very important during the first 48 hours to reduce swelling. Stay off the injured foot as much as possible until you can walk on it without pain. If needed, you may use crutches during the first week for this purpose. Crutches can be rented at many pharmacies or surgical/orthopedic supply stores.    You may be given a cast shoe to wear to prevent movement in your foot. If not, you can use a sandal or any shoe that does not put pressure on the injured area until the swelling and pain go away. If using a sandal, be careful not to hit your foot against anything, since another injury could make the sprain worse.    Apply an ice pack over the injured area for 15 to 20 minutes every 3 to 6 hours. You should do this for the first 24 to 48 hours. You can make an ice pack by filling a plastic bag that seals at the top with ice cubes and then wrapping it with a thin towel. Continue to use ice packs for relief of pain and swelling as needed. As the ice melts, avoid getting any wrap, splint, or cast wet. After 48 hours, apply heat from a warm shower or bath for 20 minutes several times daily. Alternating ice and heat may also be helpful.    You may use over-the-counter pain medicine to control pain, unless another medicine was prescribed. If you have chronic liver or kidney disease or ever had a stomach ulcer or GI bleeding, talk with your healthcare provider before using these medicines.    If you were given a splint or cast, keep it dry. Bathe with your splint or cast well out of the water, protected with 2 large plastic bags, rubber-banded at the top end. If a fiberglass splint or cast gets wet, you can dry it with a hair dryer.    You may return to sports after healing, when you can run without pain.  Follow-up care  Follow up with your healthcare provider as directed. Sometimes fractures don t show up on the first X-ray. Bruises and sprains can  sometimes hurt as much as a fracture. These injuries can take time to heal completely. If your symptoms don t improve or they get worse, talk with your healthcare provider. You may need a repeat X-ray.  When to seek medical advice  Call your healthcare provider right away if any of these occur:    The plaster cast or splint gets wet or soft    The fiberglass cast or splint gets wet and does not dry for 24 hours    Pain or swelling increases, or redness appears    A bad odor comes from within the cast    Fever of 100.4 F (38 C) or above lasting for 24 to 48 hours    Toes on the injured foot become cold, blue, numb, or tingly  Date Last Reviewed: 11/20/2015 2000-2017 [employer]. [address] [place] [address], [place] [address]. All rights reserved. This information is not intended as a substitute for professional medical care. Always follow your healthcare professional's instructions.          24 Hour Appointment Hotline       To make an appointment at any [facility] [place], call [phone] ([phone]). If you don't have a family doctor or clinic, we will help you find one. [facility] clinics are conveniently located to serve the needs of you and your family.             Review of your medicines      Our records show that you are taking the medicines listed below. If these are incorrect, please call your family doctor or clinic.        Dose / Directions Last dose taken    clonazePAM 1 MG tablet   Commonly known as:  klonoPIN   Dose:  1 mg   Quantity:  30 tablet        Take 1 tablet (1 mg) by mouth nightly as needed for anxiety Increased dose.   Refills:  3        ibuprofen 800 MG tablet   Commonly known as:  ADVIL/MOTRIN   Dose:  800 mg   Quantity:  90 tablet        Take 1 tablet (800 mg) by mouth every 8 hours as needed for moderate pain   Refills:  1        levonorgestrel 20 MCG/24HR IUD   Commonly known as:  MIRENA   Dose:  1 each   Quantity:  1 each        1 each (20 mcg) by Intrauterine route once  for 1 dose   Refills:  0        traMADol 50 MG tablet   Commonly known as:  ULTRAM   Dose:  50 mg   Quantity:  90 tablet        Take 1 tablet (50 mg) by mouth every 8 hours as needed for pain   Refills:  0                Orders Needing Specimen Collection     None      Pending Results     No orders found from 2/10/2018 to 2/13/2018.            Pending Culture Results     No orders found from 2/10/2018 to 2/13/2018.            Pending Results Instructions     If you had any lab results that were not finalized at the time of your Discharge, you can call the ED Lab Result RN at [phone]. You will be contacted by this team for any positive Lab results or changes in treatment. The nurses are available 7 days a week from 10A to 6:30P.  You can leave a message 24 hours per day and they will return your call.        Test Results From Your Hospital Stay               Clinical Quality Measure: Blood Pressure Screening     Your blood pressure was checked while you were in the emergency department today. The last reading we obtained was  BP: 104/78 . Please read the guidelines below about what these numbers mean and what you should do about them.  If your systolic blood pressure (the top number) is less than 120 and your diastolic blood pressure (the bottom number) is less than 80, then your blood pressure is normal. There is nothing more that you need to do about it.  If your systolic blood pressure (the top number) is 120-139 or your diastolic blood pressure (the bottom number) is 80-89, your blood pressure may be higher than it should be. You should have your blood pressure rechecked within a year by a primary care provider.  If your systolic blood pressure (the top number) is 140 or greater or your diastolic blood pressure (the bottom number) is 90 or greater, you may have high blood pressure. High blood pressure is treatable, but if left untreated over time it can put you at risk for heart attack, stroke, or kidney  failure. You should have your blood pressure rechecked by a primary care provider within the next 4 weeks.  If your provider in the emergency department today gave you specific instructions to follow-up with your doctor or provider even sooner than that, you should follow that instruction and not wait for up to 4 weeks for your follow-up visit.        Thank you for choosing [facility]       Thank you for choosing [facility] for your care. Our goal is always to provide you with excellent care. Hearing back from our patients is one way we can continue to improve our services. Please take a few minutes to complete the written survey that you may receive in the mail after you visit with us. Thank you!        [facility] [employer] Information     [employer] gives you secure access to your electronic health record. If you see a primary care provider, you can also send messages to your care team and make appointments. If you have questions, please call your primary care clinic.  If you do not have a primary care provider, please call [phone] and they will assist you.        Care EveryWhere ID     This is your Care EveryWhere ID. This could be used by other organizations to access your [facility] medical records  [id number]        Equal Access to Services     HANSEL VERDUGO : Hadsabrina Quintero, kailey lebron, russel montemayor. So [facility] [phone].    ATENCIÓN: Si habla español, tiene a redding disposición servicios gratuitos de asistencia lingüística. Misti al [phone].    We comply with applicable federal civil rights laws and [place] laws. We do not discriminate on the basis of race, color, national origin, age, disability, sex, sexual orientation, or gender identity.            After Visit Summary       This is your record. Keep this with you and show to your community pharmacist(s) and doctor(s) at your next visit.

## 2018-02-12 NOTE — ED AVS SNAPSHOT
[facility]    [address]    [facility] [address]    Phone:  [phone]    Fax:  [phone]                                       Carolyn Jacobs   MRN: [id number]    Department:  [facility]   Date of Visit:  2/12/2018           After Visit Summary Signature Page     I have received my discharge instructions, and my questions have been answered. I have discussed any challenges I see with this plan with the nurse or doctor.    ..........................................................................................................................................  Patient/Patient Representative Signature      ..........................................................................................................................................  Patient Representative Print Name and Relationship to Patient    ..................................................               ................................................  Date                                            Time    ..........................................................................................................................................  Reviewed by Signature/Title    ...................................................              ..............................................  Date                                                            Time

## 2018-02-12 NOTE — DISCHARGE INSTRUCTIONS
ACE Wrap  Minor muscle or joint injuries are often treated with an elastic bandage. The bandage provides support and compression to the injured area. An elastic bandage is a stretchy, rolled bandage. Elastic bandages range in width from 2 to 6 inches. They can be used for a variety of injuries. The bandages are often called ACE bandages, after the most common brand name.  If used correctly, elastic bandages help control swelling and ease pain. An elastic bandage is also a good reminder not to overuse the injured area. However, elastic bandages do not provide a lot of support and will not prevent reinjury.  Home care    To apply an elastic bandage:    Check the skin before wrapping the injury. It should be clean, dry, and free of drainage.    Start wrapping below the injury and work your way toward the body. For an ankle sprain, start wrapping around the foot and work up toward the calf. This will help control swelling.    Overlap the edges of the bandage so it stays snuggly in place.    Wrap the bandage firmly, but not too tightly. A tight bandage can increase swelling on either end of the bandage. Make sure the bandage is wrinkle free.    Leave fingers and toes exposed.    Secure ends of the bandage (even self-sticking ones) with clips or tape.    Check frequently to ensure adequate circulation, especially in the fingers and toes. Loosen the bandage if there is local swelling, numbness, tingling, discomfort, coldness, or discoloration (skin pale or bluish in color).    Rewrap the bandage as needed during the day. Reroll the bandage as you unwind it.  Continue using the elastic bandage until the pain and swelling are gone or as your healthcare provider advises.  If you have been told to ice the area, the ice can be secured in place with the elastic bandage. Wrap the ice pack with a thin towel to protect the skin. Do not put ice or an ice pack directly on the skin.  Ice the area for no more than 20 minutes at a  time.    Follow-up care  Follow up with your healthcare provider, as advised.  When to seek medical advice  Call your healthcare provider for any of the following:    Pain and swelling that doesn't get better or gets worse    Trouble moving injured area    Skin discoloration, numbness, or tingling that doesn t go away after bandage is removed  Date Last Reviewed: 9/13/2015 2000-2017 [employer]. [address] [place] [address], [place] [address]. All rights reserved. This information is not intended as a substitute for professional medical care. Always follow your healthcare professional's instructions.          Bruises (Contusions)    A contusion is a bruise. A bruise happens when a blow to your body doesn't break the skin but does break blood vessels beneath the skin. Blood leaking from the broken vessels causes redness and swelling. As it heals, your bruise is likely to turn colors like purple, green, and yellow. This is normal. The bruise should fade in 2 or 3 weeks.  Factors that make you more likely to bruise  Almost everyone bruises now and then. Certain people do bruise more easily than others. You're more prone to bruising as you get older. That's because blood vessels become more fragile with age. You're also more likely to bruise if you have a clotting disorder such as hemophilia or take medications that reduce clotting, including aspirin, coumadin, newer agents.  When to go to the emergency room (ER)  Bruises almost always heal on their own without special treatment. But for some people, a bad bruise can be serious. Seek medical care if you:    Have a clotting disorder such as hemophilia.    Have cirrhosis or other serious liver disease.    Take blood-thinning medications such as warfarin (Coumadin).  What to expect in the ER  A doctor will examine your bruise and ask about any health conditions you have. In some cases, you may have a test to check how well your blood clots. Other treatment will  depend on your needs.  Follow-up care  Sometimes a bruise gets worse instead of better. It may become larger and more swollen. This can occur when your body walls off a small pool of blood under the skin (hematoma). In very rare cases, your doctor may need to drain excess blood from the area.  Tip:  Apply an ice pack or bag of frozen peas to a bruise (keep a thin cloth between the cold source and your skin). This can help reduce redness and swelling.   Date Last Reviewed: 12/1/2016 2000-2017 [employer]. [address] [place] [address]. All rights reserved. This information is not intended as a substitute for professional medical care. Always follow your healthcare professional's instructions.          Foot Sprain    A sprain is a stretching or tearing of the ligaments that hold a joint together. There are no broken bones. Sprains generally take from 3-6 weeks to heal. A sprain may be treated with a splint, walking cast, or special boot. Mild sprains may not need any additional support.  Home care  The following guidelines will help you care for your injury at home:    Keep your leg elevated when sitting or lying down. This is very important during the first 48 hours to reduce swelling. Stay off the injured foot as much as possible until you can walk on it without pain. If needed, you may use crutches during the first week for this purpose. Crutches can be rented at many pharmacies or surgical/orthopedic supply stores.    You may be given a cast shoe to wear to prevent movement in your foot. If not, you can use a sandal or any shoe that does not put pressure on the injured area until the swelling and pain go away. If using a sandal, be careful not to hit your foot against anything, since another injury could make the sprain worse.    Apply an ice pack over the injured area for 15 to 20 minutes every 3 to 6 hours. You should do this for the first 24 to 48 hours. You can make an ice pack by  filling a plastic bag that seals at the top with ice cubes and then wrapping it with a thin towel. Continue to use ice packs for relief of pain and swelling as needed. As the ice melts, avoid getting any wrap, splint, or cast wet. After 48 hours, apply heat from a warm shower or bath for 20 minutes several times daily. Alternating ice and heat may also be helpful.    You may use over-the-counter pain medicine to control pain, unless another medicine was prescribed. If you have chronic liver or kidney disease or ever had a stomach ulcer or GI bleeding, talk with your healthcare provider before using these medicines.    If you were given a splint or cast, keep it dry. Bathe with your splint or cast well out of the water, protected with 2 large plastic bags, rubber-banded at the top end. If a fiberglass splint or cast gets wet, you can dry it with a hair dryer.    You may return to sports after healing, when you can run without pain.  Follow-up care  Follow up with your healthcare provider as directed. Sometimes fractures don t show up on the first X-ray. Bruises and sprains can sometimes hurt as much as a fracture. These injuries can take time to heal completely. If your symptoms don t improve or they get worse, talk with your healthcare provider. You may need a repeat X-ray.  When to seek medical advice  Call your healthcare provider right away if any of these occur:    The plaster cast or splint gets wet or soft    The fiberglass cast or splint gets wet and does not dry for 24 hours    Pain or swelling increases, or redness appears    A bad odor comes from within the cast    Fever of 100.4 F (38 C) or above lasting for 24 to 48 hours    Toes on the injured foot become cold, blue, numb, or tingly  Date Last Reviewed: 11/20/2015 2000-2017 [employer]. [address] [facility], [place] [address]. All rights reserved. This information is not intended as a substitute for professional medical care. Always  follow your healthcare professional's instructions.

## 2018-02-12 NOTE — ED NOTES
"Pt refusing x-ray because \"I don't want to wait another hour. I just thought I'd come in and get a splint.\"  "

## 2018-02-19 ENCOUNTER — RADIANT APPOINTMENT (OUTPATIENT)
Dept: GENERAL RADIOLOGY | Facility: CLINIC | Age: 28
End: 2018-02-19
Attending: INTERNAL MEDICINE
Payer: COMMERCIAL

## 2018-02-19 ENCOUNTER — OFFICE VISIT (OUTPATIENT)
Dept: INTERNAL MEDICINE | Facility: CLINIC | Age: 28
End: 2018-02-19
Payer: COMMERCIAL

## 2018-02-19 VITALS
OXYGEN SATURATION: 100 % | HEIGHT: 64 IN | TEMPERATURE: 98.7 F | DIASTOLIC BLOOD PRESSURE: 68 MMHG | SYSTOLIC BLOOD PRESSURE: 108 MMHG | WEIGHT: 140 LBS | HEART RATE: 88 BPM | BODY MASS INDEX: 23.9 KG/M2

## 2018-02-19 DIAGNOSIS — S39.012A BACK STRAIN, INITIAL ENCOUNTER: Primary | ICD-10-CM

## 2018-02-19 DIAGNOSIS — S39.012A BACK STRAIN, INITIAL ENCOUNTER: ICD-10-CM

## 2018-02-19 PROCEDURE — 99213 OFFICE O/P EST LOW 20 MIN: CPT | Performed by: INTERNAL MEDICINE

## 2018-02-19 PROCEDURE — 72100 X-RAY EXAM L-S SPINE 2/3 VWS: CPT

## 2018-02-19 RX ORDER — ACETAMINOPHEN 500 MG
1000 TABLET ORAL 3 TIMES DAILY
Qty: 30 TABLET | Refills: 0 | Status: SHIPPED | OUTPATIENT
Start: 2018-02-19 | End: 2018-05-23

## 2018-02-19 RX ORDER — CYCLOBENZAPRINE HCL 10 MG
10 TABLET ORAL 3 TIMES DAILY PRN
Qty: 30 TABLET | Refills: 1 | Status: SHIPPED | OUTPATIENT
Start: 2018-02-19 | End: 2018-05-23

## 2018-02-19 NOTE — PROGRESS NOTES
SUBJECTIVE:   Carolyn Jacobs is a 27 year old female who presents to clinic today for the following health issues:      Back pain:    Presents with lower back pain, for a week after falling off an elliptical exercise machine. Her foot got caught in between the pedals and her back was twisted.   Seen in ED , diagnosed with foot sprain.   Foot is better ,but her back is still hurting. Moderate pain, with ambulation, bending, twisting, laying down. No radiation, no legs numbness or weakness.   Takes Ibuprofen and Tramadol, not better.         Problem list and histories reviewed & adjusted, as indicated.  Additional history: as documented    Patient Active Problem List   Diagnosis     CARDIOVASCULAR SCREENING; LDL GOAL LESS THAN 160     Acetabular labrum tear     Migraine     REILLY (generalized anxiety disorder)     Severe episode of recurrent major depressive disorder, without psychotic features (H)     Attention disturbance     Exercise-induced asthma     Current smoker     Borderline personality disorder     Controlled substance agreement signed     Mirena IUD insertion     Past Surgical History:   Procedure Laterality Date     C INDUCED ABORTN BY DIL/EVAC  2005     wisdom teeth         Social History   Substance Use Topics     Smoking status: Current Some Day Smoker     Packs/day: 0.25     Years: 1.00     Types: Cigarettes     Last attempt to quit: 10/19/2014     Smokeless tobacco: Former User      Comment: 5 cigs per day     Alcohol use No     Family History   Problem Relation Age of Onset     Hypertension Mother      Neurologic Disorder Mother      migraine headache     Depression Sister      Depression Brother      CEREBROVASCULAR DISEASE Maternal Grandfather          Current Outpatient Prescriptions   Medication Sig Dispense Refill     cyclobenzaprine (FLEXERIL) 10 MG tablet Take 1 tablet (10 mg) by mouth 3 times daily as needed for muscle spasms 30 tablet 1     acetaminophen (TYLENOL) 500 MG tablet Take 2  "tablets (1,000 mg) by mouth 3 times daily 30 tablet 0     traMADol (ULTRAM) 50 MG tablet Take 1 tablet (50 mg) by mouth every 8 hours as needed for pain 90 tablet 0     clonazePAM (KLONOPIN) 1 MG tablet Take 1 tablet (1 mg) by mouth nightly as needed for anxiety Increased dose. 30 tablet 3     ibuprofen (ADVIL/MOTRIN) 800 MG tablet Take 1 tablet (800 mg) by mouth every 8 hours as needed for moderate pain 90 tablet 1     levonorgestrel (MIRENA) 20 MCG/24HR IUD 1 each (20 mcg) by Intrauterine route once for 1 dose 1 each 0       Reviewed and updated as needed this visit by clinical staff  Tobacco       Reviewed and updated as needed this visit by Provider         ROS:  Constitutional, HEENT, cardiovascular, pulmonary, gi and gu systems are negative, except as otherwise noted.    OBJECTIVE:     /68  Pulse 88  Temp 98.7  F (37.1  C) (Oral)  Ht 5' 4\" (1.626 m)  Wt 140 lb (63.5 kg)  LMP 01/24/2018  SpO2 100%  BMI 24.03 kg/m2  Body mass index is 24.03 kg/(m^2).   GENERAL: healthy, alert and no distress  RESP: lungs clear to auscultation - no rales, rhonchi or wheezes  ABDOMEN: soft, nontender, no hepatosplenomegaly, no masses and bowel sounds normal  MS: no gross musculoskeletal defects noted, no edema  Palpatory tenderness LS spine L4L5 level.     Diagnostic Test Results:  Results for orders placed or performed in visit on 11/03/17   Folate   Result Value Ref Range    Folate 17.6 >5.4 ng/mL   Vitamin D Deficiency   Result Value Ref Range    Vitamin D Deficiency screening 24 20 - 75 ug/L   T3 total   Result Value Ref Range    Triiodothyronine (T3) 93 60 - 181 ng/dL   T4 free   Result Value Ref Range    T4 Free 0.83 0.76 - 1.46 ng/dL   TSH   Result Value Ref Range    TSH 1.70 0.40 - 4.00 mU/L       ASSESSMENT/PLAN:     Problem List Items Addressed This Visit     None      Visit Diagnoses     Back strain, initial encounter    -  Primary    Relevant Medications    cyclobenzaprine (FLEXERIL) 10 MG tablet    " acetaminophen (TYLENOL) 500 MG tablet    Other Relevant Orders    XR Lumbar Spine 2/3 Views (Completed)           Assess LS spine X rays - no disc or vertebral abnormality   Symptomatic treatment - added Flexeril, advised for PT, given instructions for home exercise, ambulate, avoid lifting    Follow-Up:as needed if not improved in 3-4 weeks     Judson Cortes MD  [facility]

## 2018-02-19 NOTE — NURSING NOTE
"Chief Complaint   Patient presents with     Back Pain     Increased low back pain in the last week       Initial /68  Pulse 88  Temp 98.7  F (37.1  C) (Oral)  Ht 5' 4\" (1.626 m)  Wt 140 lb (63.5 kg)  LMP 01/24/2018  SpO2 100%  BMI 24.03 kg/m2 Estimated body mass index is 24.03 kg/(m^2) as calculated from the following:    Height as of this encounter: 5' 4\" (1.626 m).    Weight as of this encounter: 140 lb (63.5 kg).  Medication Reconciliation: complete   Shelia London MA      "

## 2018-02-19 NOTE — MR AVS SNAPSHOT
"              After Visit Summary   2/19/2018    Carolyn Jaocbs    MRN: [id number]           Patient Information     Date Of Birth          1990        Visit Information        Provider Department      2/19/2018 1:20 PM Judson Cortes MD [facility]        Today's Diagnoses     Back strain, initial encounter    -  1       Follow-ups after your visit        Who to contact     If you have questions or need follow up information about today's clinic visit or your schedule please contact [facility] directly at [phone].  Normal or non-critical lab and imaging results will be communicated to you by [employer], letter or phone within 4 business days after the clinic has received the results. If you do not hear from us within 7 days, please contact the clinic through [facility] or phone. If you have a critical or abnormal lab result, we will notify you by phone as soon as possible.  Submit refill requests through [employer] or call your pharmacy and they will forward the refill request to us. Please allow 3 business days for your refill to be completed.          Additional Information About Your Visit        MyChart Information     [employer] gives you secure access to your electronic health record. If you see a primary care provider, you can also send messages to your care team and make appointments. If you have questions, please call your primary care clinic.  If you do not have a primary care provider, please call [phone] and they will assist you.        Care EveryWhere ID     This is your Care EveryWhere ID. This could be used by other organizations to access your [place] medical records  [id number]        Your Vitals Were     Pulse Temperature Height Last Period Pulse Oximetry BMI (Body Mass Index)    88 98.7  F (37.1  C) (Oral) 5' 4\" (1.626 m) 01/24/2018 100% 24.03 kg/m2       Blood Pressure from Last 3 Encounters:   02/19/18 108/68   02/12/18 104/78   01/24/18 110/70    " Weight from Last 3 Encounters:   02/19/18 140 lb (63.5 kg)   01/24/18 147 lb (66.7 kg)   08/10/17 144 lb 8 oz (65.5 kg)                 Today's Medication Changes          These changes are accurate as of 2/19/18 11:59 PM.  If you have any questions, ask your nurse or doctor.               Start taking these medicines.        Dose/Directions    acetaminophen 500 MG tablet   Commonly known as:  TYLENOL   Used for:  Back strain, initial encounter   Started by:  Judson Cortes MD        Dose:  1000 mg   Take 2 tablets (1,000 mg) by mouth 3 times daily   Quantity:  30 tablet   Refills:  0       cyclobenzaprine 10 MG tablet   Commonly known as:  FLEXERIL   Used for:  Back strain, initial encounter   Started by:  Judson Cortes MD        Dose:  10 mg   Take 1 tablet (10 mg) by mouth 3 times daily as needed for muscle spasms   Quantity:  30 tablet   Refills:  1            Where to get your medicines      These medications were sent to [facility] [address]  [facility] [place] [id number]     Phone:  [phone]     acetaminophen 500 MG tablet    cyclobenzaprine 10 MG tablet                Primary Care Provider Office Phone # Fax #    ArleenJOSE Boone [facility] [phone]       [facility] [address] [facility] [address]        Equal Access to Services     HANSEL VERDUGO AH: Hadii antelmo ku hadasho Soomaali, waaxda luqadaha, qaybta kaalmada adeegyada, russel holt haywinston chester . So [facility] [phone].    ATENCIÓN: Si habla español, tiene a redding disposición servicios gratuitos de asistencia lingüística. Misti al [phone].    We comply with applicable federal civil rights laws and [place] laws. We do not discriminate on the basis of race, color, national origin, age, disability, sex, sexual orientation, or gender identity.            Thank you!     Thank you for choosing [facility]  for your care. Our goal is always  to provide you with excellent care. Hearing back from our patients is one way we can continue to improve our services. Please take a few minutes to complete the written survey that you may receive in the mail after your visit with us. Thank you!             Your Updated Medication List - Protect others around you: Learn how to safely use, store and throw away your medicines at www.disposemymeds.org.          This list is accurate as of 18 11:59 PM.  Always use your most recent med list.                   Brand Name Dispense Instructions for use Diagnosis    acetaminophen 500 MG tablet    TYLENOL    30 tablet    Take 2 tablets (1,000 mg) by mouth 3 times daily    Back strain, initial encounter       clonazePAM 1 MG tablet    klonoPIN    30 tablet    Take 1 tablet (1 mg) by mouth nightly as needed for anxiety Increased dose.    Postpartum anxiety       cyclobenzaprine 10 MG tablet    FLEXERIL    30 tablet    Take 1 tablet (10 mg) by mouth 3 times daily as needed for muscle spasms    Back strain, initial encounter       ibuprofen 800 MG tablet    ADVIL/MOTRIN    90 tablet    Take 1 tablet (800 mg) by mouth every 8 hours as needed for moderate pain     (spontaneous vaginal delivery)       levonorgestrel 20 MCG/24HR IUD    MIRENA    1 each    1 each (20 mcg) by Intrauterine route once for 1 dose    Encounter for IUD insertion       traMADol 50 MG tablet    ULTRAM    90 tablet    Take 1 tablet (50 mg) by mouth every 8 hours as needed for pain    Hip pain, right, Acetabular labrum tear, right, subsequent encounter

## 2018-02-26 ENCOUNTER — TELEPHONE (OUTPATIENT)
Dept: INTERNAL MEDICINE | Facility: CLINIC | Age: 28
End: 2018-02-26

## 2018-02-26 DIAGNOSIS — M25.551 HIP PAIN, RIGHT: ICD-10-CM

## 2018-02-26 DIAGNOSIS — S73.191D ACETABULAR LABRUM TEAR, RIGHT, SUBSEQUENT ENCOUNTER: ICD-10-CM

## 2018-02-26 NOTE — TELEPHONE ENCOUNTER
Patient is out of med.    Tramadol 50 mg      Last Written Prescription Date:  1/24/18  Last Fill Quantity: 90,   # refills: 0  Last Office Visit: 2/19/18  Future Office visit:       Routing refill request to provider for review/approval because:  Drug not on the [facility], UMP or [facility] refill protocol or controlled substance  Signed CSA on file.  [place] reviewed:  reviewed- no concerns regarding Tramadol rxs.    [place]  [email or web address]

## 2018-02-26 NOTE — TELEPHONE ENCOUNTER
Reason for Call: please disregard this message      Do you use a [facility]?  Name of the pharmacy and phone number for the current request:  [facility]    Name of the medication requested: none    Other request: none    Can we leave a detailed message on this number? NO    Phone number patient can be reached at: Home number on file [phone] (home)    Best Time: never     Call taken on 2/26/2018 at 8:53 AM by Sharmila Whyte

## 2018-02-26 NOTE — TELEPHONE ENCOUNTER
Reason for Call:  Other     Detailed comments: have you refilled tramadol-I'm completely out     Phone Number Patient can be reached at: Home number on file [phone] (home)    Best Time: asap    Can we leave a detailed message on this number? YES    Call taken on 2/26/2018 at 8:56 AM by Sharmila Whyte

## 2018-02-27 RX ORDER — TRAMADOL HYDROCHLORIDE 50 MG/1
50 TABLET ORAL EVERY 8 HOURS PRN
Qty: 90 TABLET | Refills: 0 | Status: SHIPPED | OUTPATIENT
Start: 2018-02-27 | End: 2018-04-16

## 2018-02-27 NOTE — TELEPHONE ENCOUNTER
Patient calling asking about status of request. [facility].  Rx hand carried to [facility].

## 2018-04-16 DIAGNOSIS — S73.191D ACETABULAR LABRUM TEAR, RIGHT, SUBSEQUENT ENCOUNTER: ICD-10-CM

## 2018-04-16 DIAGNOSIS — M25.551 HIP PAIN, RIGHT: ICD-10-CM

## 2018-04-16 NOTE — TELEPHONE ENCOUNTER
[facility].  Signed CSA form in chart.      Tramadol      Last Written Prescription Date:  2/27/18  Last Fill Quantity: 90,   # refills: 0  Last Office Visit: 2/19/18  Future Office visit:       Routing refill request to provider for review/approval because:  Drug not on the [facility], UMP or [facility] refill protocol or controlled substance

## 2018-04-16 NOTE — TELEPHONE ENCOUNTER
Reason for Call:  Medication or medication refill:    Do you use a [facility]? yes        Name of the pharmacy and phone number for the current request: [facility]     Name of the medication requested: tramadol    Other request: please call her when ready     Can we leave a detailed message on this number? YES    Phone number patient can be reached at: Cell number on file:    Telephone Information:   Mobile [phone]       Best Time: asap    Call taken on 4/16/2018 at 3:47 PM by Sharmila Whyte

## 2018-04-17 RX ORDER — TRAMADOL HYDROCHLORIDE 50 MG/1
50 TABLET ORAL EVERY 8 HOURS PRN
Qty: 90 TABLET | Refills: 0 | Status: SHIPPED | OUTPATIENT
Start: 2018-04-17 | End: 2018-05-23

## 2018-05-18 DIAGNOSIS — F41.8 POSTPARTUM ANXIETY: ICD-10-CM

## 2018-05-18 RX ORDER — CLONAZEPAM 1 MG/1
1 TABLET ORAL
Qty: 30 TABLET | Refills: 3 | Status: SHIPPED | OUTPATIENT
Start: 2018-05-18 | End: 2018-08-02

## 2018-05-18 NOTE — TELEPHONE ENCOUNTER
Clonazepam      Last Written Prescription Date:  01/24/18  Last Fill Quantity: 30,   # refills: 3  Last Office Visit: 02/19/18  Future Office visit:    Next 5 appointments (look out 90 days)     May 23, 2018  9:40 AM CDT   [facility] JOSE [facility]illa CNP   [facility]    [address]  [facility] [address]   [phone]                   Routing refill request to provider for review/approval because:  Drug not on the [facility], UMP or [facility] refill protocol or controlled substance

## 2018-05-23 ENCOUNTER — OFFICE VISIT (OUTPATIENT)
Dept: INTERNAL MEDICINE | Facility: CLINIC | Age: 28
End: 2018-05-23
Payer: COMMERCIAL

## 2018-05-23 ENCOUNTER — HOSPITAL ENCOUNTER (OUTPATIENT)
Dept: MRI IMAGING | Facility: CLINIC | Age: 28
Discharge: HOME | End: 2018-05-23
Attending: NURSE PRACTITIONER | Admitting: NURSE PRACTITIONER
Payer: COMMERCIAL

## 2018-05-23 ENCOUNTER — PATIENT OUTREACH (OUTPATIENT)
Dept: CARE COORDINATION | Facility: CLINIC | Age: 28
End: 2018-05-23

## 2018-05-23 VITALS
BODY MASS INDEX: 21 KG/M2 | SYSTOLIC BLOOD PRESSURE: 100 MMHG | TEMPERATURE: 97.4 F | WEIGHT: 123 LBS | RESPIRATION RATE: 18 BRPM | DIASTOLIC BLOOD PRESSURE: 60 MMHG | OXYGEN SATURATION: 100 % | HEART RATE: 80 BPM | HEIGHT: 64 IN
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
## 2018-05-23 DIAGNOSIS — B37.31 YEAST INFECTION OF THE VAGINA: ICD-10-CM

## 2018-05-23 DIAGNOSIS — M54.50 BILATERAL LOW BACK PAIN WITHOUT SCIATICA, UNSPECIFIED CHRONICITY: ICD-10-CM

## 2018-05-23 DIAGNOSIS — S73.191D ACETABULAR LABRUM TEAR, RIGHT, SUBSEQUENT ENCOUNTER: ICD-10-CM

## 2018-05-23 DIAGNOSIS — F41.1 GAD (GENERALIZED ANXIETY DISORDER): ICD-10-CM

## 2018-05-23 DIAGNOSIS — M25.551 HIP PAIN, RIGHT: ICD-10-CM

## 2018-05-23 PROCEDURE — 72148 MRI LUMBAR SPINE W/O DYE: CPT

## 2018-05-23 PROCEDURE — 99214 OFFICE O/P EST MOD 30 MIN: CPT | Performed by: NURSE PRACTITIONER

## 2018-05-23 RX ORDER — TRAMADOL HYDROCHLORIDE 50 MG/1
50 TABLET ORAL EVERY 8 HOURS PRN
Qty: 90 TABLET | Refills: 0 | Status: SHIPPED | OUTPATIENT
Start: 2018-05-23 | End: 2018-07-05

## 2018-05-23 RX ORDER — FLUCONAZOLE 150 MG/1
150 TABLET ORAL ONCE
Qty: 1 TABLET | Refills: 0 | Status: SHIPPED | OUTPATIENT
Start: 2018-05-23 | End: 2018-05-23

## 2018-05-23 RX ORDER — IBUPROFEN 800 MG/1
800 TABLET, FILM COATED ORAL EVERY 8 HOURS PRN
Qty: 90 TABLET | Refills: 1 | Status: SHIPPED | OUTPATIENT
Start: 2018-05-23 | End: 2018-08-02

## 2018-05-23 ASSESSMENT — PATIENT HEALTH QUESTIONNAIRE - PHQ9
10. IF YOU CHECKED OFF ANY PROBLEMS, HOW DIFFICULT HAVE THESE PROBLEMS MADE IT FOR YOU TO DO YOUR WORK, TAKE CARE OF THINGS AT HOME, OR GET ALONG WITH OTHER PEOPLE: VERY DIFFICULT
SUM OF ALL RESPONSES TO PHQ QUESTIONS 1-9: 10
SUM OF ALL RESPONSES TO PHQ QUESTIONS 1-9: 10

## 2018-05-23 NOTE — NURSING NOTE
"Chief Complaint   Patient presents with     RECHECK     review meds,pt c/o ongoing low back pain since Feb 2018, pt fell while at the gym working out. Pt having to imbrace self with cough, having difficulty picking up child, pt cannot have sex due to pain.     initial /60 (BP Location: Right arm, Patient Position: Chair, Cuff Size: Adult Regular)  Pulse 80  Temp 97.4  F (36.3  C) (Oral)  Resp 18  Ht 5' 4\" (1.626 m)  Wt 123 lb (55.8 kg)  LMP 05/21/2018  SpO2 100%  BMI 21.11 kg/m2 Estimated body mass index is 21.11 kg/(m^2) as calculated from the following:    Height as of this encounter: 5' 4\" (1.626 m).    Weight as of this encounter: 123 lb (55.8 kg)..  bp completed using cuff size regular    "

## 2018-05-23 NOTE — MR AVS SNAPSHOT
After Visit Summary   5/23/2018    Carolyn Jacobs    MRN: [id number]           Patient Information     Date Of Birth          1990        Visit Information        Provider Department      5/23/2018 9:40 AM Arleen Napier APRN CNP [facility]        Today's Diagnoses     Activity, other caregiving    -  1    Bilateral low back pain without sciatica, unspecified chronicity        REILLY (generalized anxiety disorder)        Hip pain, right        Acetabular labrum tear, right, subsequent encounter        Yeast infection of the vagina          Care Instructions    MRI lumbar spine   [phone]    [facility]: [facility] [phone]   [email or web address]    Refill on medication             Follow-ups after your visit        Additional Services     CARE COORDINATION REFERRAL       Services are provided by a Care Coordinator for people with complex needs such as: medical, social, or financial troubles.  The Care Coordinator works with the patient and their Primary Care Provider to determine health goals, obtain resources, achieve outcomes, and develop care plans that help coordinate the patient's care.     Reason for Referral: Patient/Caregiver Support: Resources for Support    Additional pertinent details:  Has an autistic son and needs help at home   Wonder about help at home for him       Clinical Staff have discussed the Care Coordination Referral with the patient and/or caregiver: yes            NEUROSURGERY REFERRAL       Your provider has referred you to: [facility]: [facility] [phone]   [email or web address]    Please be aware that coverage of these services is subject to the terms and limitations of your health insurance plan.  Call member services at your health plan with any benefit or coverage questions.      Please bring the following with you to  "your appointment:    (1) Any X-Rays, CTs or MRIs which have been performed.  Contact the facility where they were done to arrange for  prior to your scheduled appointment.   (2) List of current medications  (3) This referral request   (4) Any documents/labs given to you for this referral                  Future tests that were ordered for you today     Open Future Orders        Priority Expected Expires Ordered    MR Lumbar Spine w/o Contrast Routine  5/23/2019 5/23/2018            Who to contact     If you have questions or need follow up information about today's clinic visit or your schedule please contact [facility] directly at [phone].  Normal or non-critical lab and imaging results will be communicated to you by MyChart, letter or phone within 4 business days after the clinic has received the results. If you do not hear from us within 7 days, please contact the clinic through [employer] or phone. If you have a critical or abnormal lab result, we will notify you by phone as soon as possible.  Submit refill requests through [employer] or call your pharmacy and they will forward the refill request to us. Please allow 3 business days for your refill to be completed.          Additional Information About Your Visit        [employer] Information     [employer] gives you secure access to your electronic health record. If you see a primary care provider, you can also send messages to your care team and make appointments. If you have questions, please call your primary care clinic.  If you do not have a primary care provider, please call [phone] and they will assist you.        Care EveryWhere ID     This is your Care EveryWhere ID. This could be used by other organizations to access your [place] medical records  [id number]        Your Vitals Were     Pulse Temperature Respirations Height Last Period Pulse Oximetry    80 97.4  F (36.3  C) (Oral) 18 5' 4\" (1.626 m) 05/21/2018 100%    BMI (Body " Mass Index)                   21.11 kg/m2            Blood Pressure from Last 3 Encounters:   05/23/18 100/60   02/19/18 108/68   02/12/18 104/78    Weight from Last 3 Encounters:   05/23/18 123 lb (55.8 kg)   02/19/18 140 lb (63.5 kg)   01/24/18 147 lb (66.7 kg)              We Performed the Following     CARE COORDINATION REFERRAL     DEPRESSION ACTION PLAN (DAP)     NEUROSURGERY REFERRAL          Today's Medication Changes          These changes are accurate as of 5/23/18 10:46 AM.  If you have any questions, ask your nurse or doctor.               Start taking these medicines.        Dose/Directions    fluconazole 150 MG tablet   Commonly known as:  DIFLUCAN   Used for:  Yeast infection of the vagina   Started by:  Arleen Napier APRN CNP        Dose:  150 mg   Take 1 tablet (150 mg) by mouth once for 1 dose   Quantity:  1 tablet   Refills:  0         Stop taking these medicines if you haven't already. Please contact your care team if you have questions.     acetaminophen 500 MG tablet   Commonly known as:  TYLENOL   Stopped by:  Arleen Napier APRN CNP           cyclobenzaprine 10 MG tablet   Commonly known as:  FLEXERIL   Stopped by:  Arleen Napier APRN CNP                Where to get your medicines      These medications were sent to [facility] [address]  [facility] [address] [facility] [address]     Phone:  [phone]     fluconazole 150 MG tablet    ibuprofen 800 MG tablet         Some of these will need a paper prescription and others can be bought over the counter.  Ask your nurse if you have questions.     Bring a paper prescription for each of these medications     traMADol 50 MG tablet               Information about OPIOIDS     PRESCRIPTION OPIOIDS: WHAT YOU NEED TO KNOW   You have a prescription for an opioid (narcotic) pain medicine. Opioids can cause addiction. If you have a history of chemical dependency of any type, you are at  a higher risk of becoming addicted to opioids. Only take this medicine after all other options have been tried. Take it for as short a time and as few doses as possible.     Do not:    Drive. If you drive while taking these medicines, you could be arrested for driving under the influence (DUI).    Operate heavy machinery    Do any other dangerous activities while taking these medicines.     Drink any alcohol while taking these medicines.      Take with any other medicines that contain acetaminophen. Read all labels carefully. Look for the word  acetaminophen  or  Tylenol.  Ask your pharmacist if you have questions or are unsure.    Store your pills in a secure place, locked if possible. We will not replace any lost or stolen medicine. If you don t finish your medicine, please throw away (dispose) as directed by your pharmacist. The [place] Pollution Control Agency has more information about safe disposal: https://www.pca.[facility].mn.us/living-green/managing-unwanted-medications    All opioids tend to cause constipation. Drink plenty of water and eat foods that have a lot of fiber, such as fruits, vegetables, prune juice, apple juice and high-fiber cereal. Take a laxative (Miralax, milk of magnesia, Colace, Senna) if you don t move your bowels at least every other day.          Primary Care Provider Office Phone # Fax #    JOSE Bonilla [facility] [phone]       [address] [facility] [address]        Equal Access to Services     HANSEL VERDUGO : Hadii antelmo zamora hadasho Soelen, waaxda luqadaha, qaybta kaalmada chad, russel martinez. So [facility] [phone].    ATENCIÓN: Si habla español, tiene a redding disposición servicios gratuitos de asistencia lingüística. Misti irene [phone].    We comply with applicable federal civil rights laws and [place] laws. We do not discriminate on the basis of race, color, national origin, age, disability, sex, sexual orientation, or  gender identity.            Thank you!     Thank you for choosing [facility]  for your care. Our goal is always to provide you with excellent care. Hearing back from our patients is one way we can continue to improve our services. Please take a few minutes to complete the written survey that you may receive in the mail after your visit with us. Thank you!             Your Updated Medication List - Protect others around you: Learn how to safely use, store and throw away your medicines at www.disposemymeds.org.          This list is accurate as of 5/23/18 10:46 AM.  Always use your most recent med list.                   Brand Name Dispense Instructions for use Diagnosis    clonazePAM 1 MG tablet    klonoPIN    30 tablet    Take 1 tablet (1 mg) by mouth nightly as needed for anxiety Increased dose.    Postpartum anxiety       fluconazole 150 MG tablet    DIFLUCAN    1 tablet    Take 1 tablet (150 mg) by mouth once for 1 dose    Yeast infection of the vagina       ibuprofen 800 MG tablet    ADVIL/MOTRIN    90 tablet    Take 1 tablet (800 mg) by mouth every 8 hours as needed for moderate pain    Hip pain, right, Bilateral low back pain without sciatica, unspecified chronicity       levonorgestrel 20 MCG/24HR IUD    MIRENA    1 each    1 each (20 mcg) by Intrauterine route once for 1 dose    Encounter for IUD insertion       traMADol 50 MG tablet    ULTRAM    90 tablet    Take 1 tablet (50 mg) by mouth every 8 hours as needed for pain    Hip pain, right, Acetabular labrum tear, right, subsequent encounter

## 2018-05-23 NOTE — PATIENT INSTRUCTIONS
MRI lumbar spine   [phone]    [facility] [phone]   [email or web address]    Refill on medication

## 2018-05-23 NOTE — PROGRESS NOTES
Clinic Care Coordination Contact      Referral Source: PCP  Clinical Data: Care Coordinator Outreach  Spoke to pt who stated that he son is 3.5 years old. Pt stated that she is looking for resources for him and indicated that SW could mail them. Pt confirmed her address for SW to mail her resources. SW mailed resources for [facility], [place], [employer] (contact information for the [place] person), [employer], and general information on Autism and Children resources.     Plan:  Care Coordinator will try to reach patient again in 3-5 business days to allow time for pt to get the information.    LISA Cui, [facility]  Social Work - Care Coordinator  [facility] [place] [place] [place]  Phone: [phone]

## 2018-05-24 ASSESSMENT — PATIENT HEALTH QUESTIONNAIRE - PHQ9: SUM OF ALL RESPONSES TO PHQ QUESTIONS 1-9: 10

## 2018-05-30 ENCOUNTER — TELEPHONE (OUTPATIENT)
Dept: INTERNAL MEDICINE | Facility: CLINIC | Age: 28
End: 2018-05-30

## 2018-05-30 ENCOUNTER — PATIENT OUTREACH (OUTPATIENT)
Dept: CARE COORDINATION | Facility: CLINIC | Age: 28
End: 2018-05-30

## 2018-05-30 NOTE — TELEPHONE ENCOUNTER
Reason for Call:  Request for results:    Name of test or procedure: MRI    Date of test of procedure: 5/23/18    Location of the test or procedure: [place]    OK to leave the result message on voice mail or with a family member? YES    Phone number Patient can be reached at:  Cell number on file:    Telephone Information:   Mobile [phone]       Additional comments: Please call pt back with results as soon as able    Call taken on 5/30/2018 at 3:14 PM by Preeti Chandler

## 2018-05-31 NOTE — TELEPHONE ENCOUNTER
Results had been sent via My Chart which she has not read.  Called number given and outgoing message just said to try back later with no opportunity to leave a voicemail.  ROSALES Lopez R.N.

## 2018-06-04 NOTE — TELEPHONE ENCOUNTER
"Pt called. Wanted results. Relayed info sent via my chart. Pt stated she does not have my chart.. Relayed her chart says \"active\", so if she is not using it she can call and deactivate it, or just get a new password.       Relayed Arleen's message and transferred her to sched appt with neurosurgery   "

## 2018-06-07 ENCOUNTER — OFFICE VISIT (OUTPATIENT)
Dept: NEUROSURGERY | Facility: CLINIC | Age: 28
End: 2018-06-07
Attending: NURSE PRACTITIONER
Payer: COMMERCIAL

## 2018-06-07 ENCOUNTER — TELEPHONE (OUTPATIENT)
Dept: PALLIATIVE MEDICINE | Facility: CLINIC | Age: 28
End: 2018-06-07

## 2018-06-07 VITALS
DIASTOLIC BLOOD PRESSURE: 71 MMHG | SYSTOLIC BLOOD PRESSURE: 112 MMHG | HEIGHT: 64 IN | OXYGEN SATURATION: 99 % | BODY MASS INDEX: 21 KG/M2 | WEIGHT: 123 LBS | HEART RATE: 100 BPM

## 2018-06-07 DIAGNOSIS — S30.0XXA CONTUSION OF COCCYX, INITIAL ENCOUNTER: Primary | ICD-10-CM

## 2018-06-07 PROCEDURE — G0463 HOSPITAL OUTPT CLINIC VISIT: HCPCS | Performed by: NURSE PRACTITIONER

## 2018-06-07 PROCEDURE — 99243 OFF/OP CNSLTJ NEW/EST LOW 30: CPT | Performed by: NURSE PRACTITIONER

## 2018-06-07 ASSESSMENT — PAIN SCALES - GENERAL: PAINLEVEL: SEVERE PAIN (6)

## 2018-06-07 NOTE — LETTER
6/7/2018         RE: Carolyn Jacobs  [address] [address] [address]        Dear Colleague,    Thank you for referring your patient, Carolyn Jacobs, to the [facility]. Please see a copy of my visit note below.    Dr. Eleazar Gutiérrez  [facility]  Neurosurgery Clinic Visit        CC: tailbone pain    Primary care Provider: Arleen Napier      Reason For Visit:   I was asked by Dr. Napier to consult on the patient for tailbone.      HPI: Carolyn Jacobs is a 28 year old female with tailbone pain. She notes that this began after she fell of an piece of equipment at the gym about 3 months ago. She did not hit her bottom but has pain. She notes that any type of pressure to the area makes it worse.  She has to stand or pace to relieve the pain. She notes that any lifting can cause it to hurt. She denies any radicular symptoms or weakness to her legs. She notes that it is isolated to her tailbone area.  She does not feel much helps for the pain.  She feels it is affecting all aspects of her life.  She states that running makes it worse as well.     Pain at its worst 10  Pain right now:  6    Past Medical History:   Diagnosis Date     Abnormal Pap smear     HPV & normal      Anemia     not on iron vits, O+ blood type     Anxiety      Mirena IUD insertion 8/10/2017    Lot #  [id number] Exp. Date (last month of insertion) 02/2019 NDC # 71290-069-15 REMOVE BY AUG. 2022      Panic attacks      Varicosities        Past Medical History reviewed with patient during visit.    Past Surgical History:   Procedure Laterality Date     C INDUCED ABORTN BY DIL/EVAC  2005     wisdom teeth       Past Surgical History reviewed with patient during visit.    Current Outpatient Prescriptions   Medication     clonazePAM (KLONOPIN) 1 MG tablet     ibuprofen (ADVIL/MOTRIN) 800 MG tablet     levonorgestrel (MIRENA) 20 MCG/24HR IUD     traMADol (ULTRAM) 50 MG tablet     No current  "facility-administered medications for this visit.        No Known Allergies    Social History     Social History     Marital status: Single     Spouse name: N/A     Number of children: 2     Years of education: 11     Occupational History     student      Social History Main Topics     Smoking status: Current Some Day Smoker     Packs/day: 0.25     Years: 1.00     Types: Cigarettes     Last attempt to quit: 10/19/2014     Smokeless tobacco: Former User      Comment: 5 cigs per day     Alcohol use No     Drug use: No     Sexual activity: Yes     Partners: Male     Birth control/ protection: None     Other Topics Concern     None     Social History Narrative       Family History   Problem Relation Age of Onset     Hypertension Mother      Neurologic Disorder Mother      migraine headache     Depression Sister      Depression Brother      CEREBROVASCULAR DISEASE Maternal Grandfather          Review Of Systems  Skin: negative  Eyes: negative  Ears/Nose/Throat: negative  Respiratory: No shortness of breath, dyspnea on exertion, cough, or hemoptysis  Cardiovascular: negative  Gastrointestinal: negative  Genitourinary: negative  Musculoskeletal: tailbone pain  Neurologic: negative  Psychiatric: negative  Hematologic/Lymphatic/Immunologic: negative  Endocrine: negative     ROS: 10 point ROS neg other than the symptoms noted above in the HPI.    Vital Signs: /71 (BP Location: Right arm, Patient Position: Sitting, Cuff Size: Adult Regular)  Pulse 100  Ht 5' 4\" (1.626 m)  Wt 123 lb (55.8 kg)  LMP 05/21/2018  SpO2 99%  BMI 21.11 kg/m2    Examination:  Constitutional:  Alert, well nourished, NAD.  Memory: recent and remote memory intact  HEENT: Normocephalic, atraumatic.   Pulm:  Without shortness of breath   CV:  No pitting edema of BLE.    Neurological:  Awake  Alert  Oriented x 3  Speech clear  Cranial nerves II - XII intact  PERRL  EOMI  Face symmetric  Tongue midline  Motor exam   Shoulder Abduction:  Right:  " 5/5   Left:  5/5  Biceps:                      Right:  5/5   Left:  5/5  Triceps:                     Right:  5/5   Left:  5/5  Wrist Extensors:       Right:  5/5   Left:  5/5  Wrist Flexors:           Right:  5/5   Left:  5/5  Intrinsics:                   Right:  5/5   Left:  5/5   Hip Flexor:                Right: 5/5  Left:  5/5  Hip Adductor:             Right:  5/5  Left:  5/5  Hip Abductor:             Right:  5/5  Left:  5/5  Gastroc Soleus:        Right:  5/5  Left:  5/5  Tib/Ant:                      Right:  5/5  Left:  5/5  EHL:                          Right:  5/5  Left:  5/5   Sensation normal to bilateral upper and lower extremities  Muscle tone to bilateral upper and lower extremities normal   Gait: Able to stand from a seated position. Normal non-antalgic, non-myelopathic gait.  Able to heel/toe walk without loss of balance  Lumbar examination reveals no tenderness of coccyx region. Pain with lumbar extension.  Hip height is symmetrical. Negative SI joint, sciatic notch or greater trochanteric tenderness to palpation bilaterally.  Straight leg raise is negative bilaterally.      Imaging:     Lumbar MRI:    IMPRESSION: No lumbar degenerative loss of disc signal, disc  herniation, stenosis, or apparent neural impingement.    Assessment/Plan:   Carolyn Jacobs is a 28 year old female with tailbone pain. She notes that this began after she fell of an piece of equipment at the gym about 3 months ago. She did not hit her bottom but has pain. She notes that any type of pressure to the area makes it worse.  She has to stand or pace to relieve the pain. She notes that any lifting can cause it to hurt. She denies any radicular symptoms or weakness to her legs. She notes that it is isolated to her tailbone area.  She does not feel much helps for the pain.  She feels it is affecting all aspects of her life.  She states that running makes it worse as well. The pts lumbar MRI and xray are negative.  She is having  more localized coccyx pain. It was explained that a coccyx injury or contusion can take time to heal. It was recommended that she try a caudal epidural injection and chiropractic care with Dr. Angela Menendez. She is open to this. She has a 3 year old Autistic son and a 16 month old daughter to care for at home.       Patient Instructions   1. Please schedule at [facility]ropractic care with Dr. Angela Menendez      2. Please schedule your injection. Someone will contact you from the pain clinic within 24 hours to schedule.             Mckenzie Haynes CNP  [facility]  [place] [address]    Tel [phone]  Pager [phone]      Again, thank you for allowing me to participate in the care of your patient.        Sincerely,        JOSE Love CNP

## 2018-06-07 NOTE — NURSING NOTE
"Carolyn Jacobs is a 28 year old female who presents for:  Chief Complaint   Patient presents with     Neurologic Problem     Severe low back pain for about 3 months and goes down to her tailbone, anything that has to do with pressure (coughing, sneezing etc) is painful        Vitals:    Vitals:    06/07/18 1349   BP: 112/71   BP Location: Right arm   Patient Position: Sitting   Cuff Size: Adult Regular   Pulse: 100   SpO2: 99%   Weight: 123 lb (55.8 kg)   Height: 5' 4\" (1.626 m)       BMI:  Estimated body mass index is 21.11 kg/(m^2) as calculated from the following:    Height as of this encounter: 5' 4\" (1.626 m).    Weight as of this encounter: 123 lb (55.8 kg).    Pain Score:  Severe Pain (6)      Do you feel safe in your environment?  Yes      Chelsie Pereyra          "

## 2018-06-07 NOTE — PROGRESS NOTES
Dr. Eleazar Gutiérrez  [facility]  Neurosurgery Clinic Visit        CC: tailbone pain    Primary care Provider: Arleen Napier      Reason For Visit:   I was asked by Dr. Napier to consult on the patient for tailbone.      HPI: Carolyn Jacobs is a 28 year old female with tailbone pain. She notes that this began after she fell of an piece of equipment at the gym about 3 months ago. She did not hit her bottom but has pain. She notes that any type of pressure to the area makes it worse.  She has to stand or pace to relieve the pain. She notes that any lifting can cause it to hurt. She denies any radicular symptoms or weakness to her legs. She notes that it is isolated to her tailbone area.  She does not feel much helps for the pain.  She feels it is affecting all aspects of her life.  She states that running makes it worse as well.     Pain at its worst 10  Pain right now:  6    Past Medical History:   Diagnosis Date     Abnormal Pap smear     HPV & normal      Anemia     not on iron vits, O+ blood type     Anxiety      Mirena IUD insertion 8/10/2017    Lot #  [id number] Exp. Date (last month of insertion) 02/2019 NDC # 80278-137-86 REMOVE BY AUG. 2022      Panic attacks      Varicosities        Past Medical History reviewed with patient during visit.    Past Surgical History:   Procedure Laterality Date     C INDUCED ABORTN BY DIL/EVAC  2005     wisdom teeth       Past Surgical History reviewed with patient during visit.    Current Outpatient Prescriptions   Medication     clonazePAM (KLONOPIN) 1 MG tablet     ibuprofen (ADVIL/MOTRIN) 800 MG tablet     levonorgestrel (MIRENA) 20 MCG/24HR IUD     traMADol (ULTRAM) 50 MG tablet     No current facility-administered medications for this visit.        No Known Allergies    Social History     Social History     Marital status: Single     Spouse name: N/A     Number of children: 2     Years of education: 11     Occupational History     student   "    Social History Main Topics     Smoking status: Current Some Day Smoker     Packs/day: 0.25     Years: 1.00     Types: Cigarettes     Last attempt to quit: 10/19/2014     Smokeless tobacco: Former User      Comment: 5 cigs per day     Alcohol use No     Drug use: No     Sexual activity: Yes     Partners: Male     Birth control/ protection: None     Other Topics Concern     None     Social History Narrative       Family History   Problem Relation Age of Onset     Hypertension Mother      Neurologic Disorder Mother      migraine headache     Depression Sister      Depression Brother      CEREBROVASCULAR DISEASE Maternal Grandfather          Review Of Systems  Skin: negative  Eyes: negative  Ears/Nose/Throat: negative  Respiratory: No shortness of breath, dyspnea on exertion, cough, or hemoptysis  Cardiovascular: negative  Gastrointestinal: negative  Genitourinary: negative  Musculoskeletal: tailbone pain  Neurologic: negative  Psychiatric: negative  Hematologic/Lymphatic/Immunologic: negative  Endocrine: negative     ROS: 10 point ROS neg other than the symptoms noted above in the HPI.    Vital Signs: /71 (BP Location: Right arm, Patient Position: Sitting, Cuff Size: Adult Regular)  Pulse 100  Ht 5' 4\" (1.626 m)  Wt 123 lb (55.8 kg)  LMP 05/21/2018  SpO2 99%  BMI 21.11 kg/m2    Examination:  Constitutional:  Alert, well nourished, NAD.  Memory: recent and remote memory intact  HEENT: Normocephalic, atraumatic.   Pulm:  Without shortness of breath   CV:  No pitting edema of BLE.    Neurological:  Awake  Alert  Oriented x 3  Speech clear  Cranial nerves II - XII intact  PERRL  EOMI  Face symmetric  Tongue midline  Motor exam   Shoulder Abduction:  Right:  5/5   Left:  5/5  Biceps:                      Right:  5/5   Left:  5/5  Triceps:                     Right:  5/5   Left:  5/5  Wrist Extensors:       Right:  5/5   Left:  5/5  Wrist Flexors:           Right:  5/5   Left:  5/5  Intrinsics:                "    Right:  5/5   Left:  5/5   Hip Flexor:                Right: 5/5  Left:  5/5  Hip Adductor:             Right:  5/5  Left:  5/5  Hip Abductor:             Right:  5/5  Left:  5/5  Gastroc Soleus:        Right:  5/5  Left:  5/5  Tib/Ant:                      Right:  5/5  Left:  5/5  EHL:                          Right:  5/5  Left:  5/5   Sensation normal to bilateral upper and lower extremities  Muscle tone to bilateral upper and lower extremities normal   Gait: Able to stand from a seated position. Normal non-antalgic, non-myelopathic gait.  Able to heel/toe walk without loss of balance  Lumbar examination reveals no tenderness of coccyx region. Pain with lumbar extension.  Hip height is symmetrical. Negative SI joint, sciatic notch or greater trochanteric tenderness to palpation bilaterally.  Straight leg raise is negative bilaterally.      Imaging:     Lumbar MRI:    IMPRESSION: No lumbar degenerative loss of disc signal, disc  herniation, stenosis, or apparent neural impingement.    Assessment/Plan:   Carolyn Jacobs is a 28 year old female with tailbone pain. She notes that this began after she fell of an piece of equipment at the gym about 3 months ago. She did not hit her bottom but has pain. She notes that any type of pressure to the area makes it worse.  She has to stand or pace to relieve the pain. She notes that any lifting can cause it to hurt. She denies any radicular symptoms or weakness to her legs. She notes that it is isolated to her tailbone area.  She does not feel much helps for the pain.  She feels it is affecting all aspects of her life.  She states that running makes it worse as well. The pts lumbar MRI and xray are negative.  She is having more localized coccyx pain. It was explained that a coccyx injury or contusion can take time to heal. It was recommended that she try a caudal epidural injection and chiropractic care with Dr. Angela Menendez. She is open to this. She has a 3 year old  Autistic son and a 16 month old daughter to care for at home.       Patient Instructions   1. Please schedule at [facility] chiropractic care with Dr. Angela Menendez      2. Please schedule your injection. Someone will contact you from the pain clinic within 24 hours to schedule.             Mckenzie Haynes [facility]  [facility]  [address] [address]    Tel [phone]  Pager [phone]

## 2018-06-07 NOTE — MR AVS SNAPSHOT
After Visit Summary   6/7/2018    Carolyn Jacobs    MRN: [id number]           Patient Information     Date Of Birth          1990        Visit Information        Provider Department      6/7/2018 1:40 PM Mckenzie Haynes APRN CNP [facility]        Today's Diagnoses     Contusion of coccyx, initial encounter    -  1      Care Instructions    1. Please schedule at [facility] chiropractic care with Dr. Angela Menendez      2. Please schedule your injection. Someone will contact you from the pain clinic within 24 hours to schedule.            Follow-ups after your visit        Additional Services     [facility] PT, HAND, AND CHIROPRACTIC REFERRAL       **This order will print in the [facility] Scheduling Office**    Physical Therapy, Hand Therapy and Chiropractic Care are available through:    *[facility]  *[facility]  *[facility]    Call one number to schedule at any of the above locations: [phone].    Your provider has referred you to: Chiropractic at [facility] or [facility] Dr. Angela Menendez      Indication/Reason for Referral: tailbone pain   Onset of Illness: 3 months   Therapy Orders: Evaluate and Treat  Special Programs: Acupuncture and None  Special Request: Manual Therapy: Myofascial Release/Massage  None    Isadora Muir      Additional Comments for the Therapist or Chiropractor:     Please be aware that coverage of these services is subject to the terms and limitations of your health insurance plan.  Call member services at your health plan with any benefit or coverage questions.      Please bring the following to your appointment:    *Your personal calendar for scheduling future appointments  *Comfortable clothing            PAIN MANAGEMENT REFERRAL       Your provider has referred you to: [facility]: [facility] -    Reason for Referral: Procedure Order Epidural:  Lumbar (Advanced imaging required in the last 3  years) caudal epidural       What is your diagnosis for the patient's pain? coccyx      For any questions, contact the [facility] at [phone].     **ANY DIAGNOSTIC TESTS THAT ARE NOT IN EPIC SHOULD BE SENT TO THE PAIN CENTER**    REGARDING OPIOID MEDICATIONS:  The discussion of opioids management, appropriateness of therapy, and dosing will be discussed in patients being seen for evaluation.  The pain management clinics are not long-term prescribing clinics, with transition of prescribing of medications ultimately going back to the referring provider/PCP.  If prescribing is taken over at the pain clinic, it is in actively involved patients whom are appropriate for opioids, urine drug screening is completed, and long-term prescribing plan has been determined.  Therefore, we will not be automatically taking over prescribing at the patient's first visit.  Is this agreeable to you? agrees.     Please be aware that coverage of these services is subject to the terms and limitations of your health insurance plan.  Call member services at your health plan with any benefit or coverage questions.      Please bring the following with you to your appointment:    (1) Any X-Rays, CTs or MRIs which have been performed.  Contact the facility where they were done to arrange for  prior to your scheduled appointment.    (2) List of current medications   (3) This referral request   (4) Any documents/labs given to you for this referral                  Who to contact     If you have questions or need follow up information about today's clinic visit or your schedule please contact [facility] directly at [phone].  Normal or non-critical lab and imaging results will be communicated to you by MyChart, letter or phone within 4 business days after the clinic has received the results. If you do not hear from us within 7 days, please contact the clinic through MyChart or phone. If you  "have a critical or abnormal lab result, we will notify you by phone as soon as possible.  Submit refill requests through [employer] or call your pharmacy and they will forward the refill request to us. Please allow 3 business days for your refill to be completed.          Additional Information About Your Visit        [employer] Information     [employer] gives you secure access to your electronic health record. If you see a primary care provider, you can also send messages to your care team and make appointments. If you have questions, please call your primary care clinic.  If you do not have a primary care provider, please call [phone] and they will assist you.        Care EveryWhere ID     This is your Care EveryWhere ID. This could be used by other organizations to access your [facility] medical records  [id number]        Your Vitals Were     Pulse Height Last Period Pulse Oximetry BMI (Body Mass Index)       100 5' 4\" (1.626 m) 05/21/2018 99% 21.11 kg/m2        Blood Pressure from Last 3 Encounters:   06/07/18 112/71   05/23/18 100/60   02/19/18 108/68    Weight from Last 3 Encounters:   06/07/18 123 lb (55.8 kg)   05/23/18 123 lb (55.8 kg)   02/19/18 140 lb (63.5 kg)              We Performed the Following     [facility] PT, HAND, AND CHIROPRACTIC REFERRAL     PAIN MANAGEMENT REFERRAL        Primary Care Provider Office Phone # Fax #    JOSE Bonilla [facility] [phone]       [address] [facility] [address]        Equal Access to Services     [facility] : Hadii aad ku hadasho Soomaali, waaxda luqadaha, qaybta kaalmada adeegyada, waxay yanet chester . So [facility] [phone].    ATENCIÓN: Si heberla karley, tiene a redding disposición servicios gratuitos de asistencia lingüística. Llame al [phone].    We comply with applicable federal civil rights laws and [place] laws. We do not discriminate on the basis of race, color, national origin, age, disability, sex, sexual " orientation, or gender identity.            Thank you!     Thank you for choosing [facility]  for your care. Our goal is always to provide you with excellent care. Hearing back from our patients is one way we can continue to improve our services. Please take a few minutes to complete the written survey that you may receive in the mail after your visit with us. Thank you!             Your Updated Medication List - Protect others around you: Learn how to safely use, store and throw away your medicines at www.disposemymeds.org.          This list is accurate as of 6/7/18  1:59 PM.  Always use your most recent med list.                   Brand Name Dispense Instructions for use Diagnosis    clonazePAM 1 MG tablet    klonoPIN    30 tablet    Take 1 tablet (1 mg) by mouth nightly as needed for anxiety Increased dose.    Postpartum anxiety       ibuprofen 800 MG tablet    ADVIL/MOTRIN    90 tablet    Take 1 tablet (800 mg) by mouth every 8 hours as needed for moderate pain    Hip pain, right, Bilateral low back pain without sciatica, unspecified chronicity       levonorgestrel 20 MCG/24HR IUD    MIRENA    1 each    1 each (20 mcg) by Intrauterine route once for 1 dose    Encounter for IUD insertion       traMADol 50 MG tablet    ULTRAM    90 tablet    Take 1 tablet (50 mg) by mouth every 8 hours as needed for pain    Hip pain, right, Acetabular labrum tear, right, subsequent encounter

## 2018-06-07 NOTE — TELEPHONE ENCOUNTER
Pre-screening Questions for Radiology Injections:    Injection to be done at which interventional clinic site? [facility], instruct patient to arrive 30 minutes before the scheduled appointment time.     Procedure ordered by Mckenzie Haynes    Procedure ordered? Caudal Epidural Steroid Injection    What insurance would patient like us to bill for this procedure? [employer]      Worker's comp or MVA (motor vehicle accident) -Any injection DO NOT SCHEDULE and route to Cheryl Kaur.      [employer] - For SI joint injections, DO NOT SCHEDULE and route Alyce Hamilton. [employer] NO PA REQUIRED EFFECTIVE 11/1/2017      [employer]- MBB's must be scheduled at LEAST two weeks apart      [employer] - Any injection besides hip/shoulder/knee joint DO NOT SCHEDULE and route to Alyce Alfonso. She will obtain PA and call pt back to schedule procedure or notify pt of denial.       [employer]-Route to Alyce for review    Any chance of pregnancy? NO   If YES, do NOT schedule and route to RN pool    Is an  needed? No     Patient has a drive home? (mandatory) YES:     Is patient taking any blood thinners (plavix, coumadin, jantoven, warfarin, heparin, pradaxa or dabigatran )? No   If hold needed, do NOT schedule, route to RN pool     Is patient taking any aspirin products? No     If more than 325mg/day do NOT schedule; route to RN pool     For CERVICAL procedures, hold all aspirin products for 6 days.      Does the patient have a bleeding or clotting disorder? No     If YES, okay to schedule AND route to RN nurse pool    **For any patients with platelet count <100, must be forwarded to provider**    Is patient diabetic?  No  If YES, have them bring their glucometer.    Does patient have an active infection or treated for one within the past week? Yes - YEAST INFECTION     Is patient currently taking any antibiotics?  No     For patients on chronic, preventative, or prophylactic  antibiotics, procedures may be scheduled.     For patients on antibiotics for active or recent infection:    Bruno Denton Burton, Snitzer-antibiotic course must have been completed for 4 days    Is patient currently taking any steroid medications? (i.e. Prednisone, Medrol)  No     For patients on steroid medications:    Bruno Denton Burton, Snitzer-steroid course must have been completed for 4 days    Reviewed with patient:  If you are started on any steroids or antibiotics between now and your appointment, you must contact us because it may affect our ability to perform your procedure.  Yes    Is patient actively being treated for cancer or immunocompromised? No  If YES, do NOT schedule and route to RN pool     Are you able to get on and off an exam table with minimal or no assistance? Yes  If NO, do NOT schedule and route to RN pool    Are you able to roll over and lay on your stomach with minimal or no assistance? Yes  If NO, do NOT schedule and route to RN pool     Any allergies to contrast dye, iodine, shellfish, or numbing and steroid medications? No  If YES, route to RN pool AND add allergy information to appointment notes    Allergies: Review of patient's allergies indicates no known allergies.      Has the patient had a flu shot or any other vaccinations within 7 days before or after the procedure.  No     Does patient have an MRI/CT?  YES: MRI  (SI joint, hip injections, lumbar sympathetic blocks, and stellate ganglion blocks do not require an MRI)    Was the MRI done w/in the last 3 years?  Yes    Was MRI done at [place]? Yes      If not, where was it done? N/A       If MRI was not done at [place], [facility] or [facility] do NOT schedule and route to nursing.  If pt has an imaging disc, the injection may be scheduled but pt has to bring disc to appt. If they show up w/out disc the injection cannot be done    Reminders (please tell patient if applicable):       Instructed pt to  arrive 30 minutes early for IV start if this is for a cervical procedure, ALL sympathetic (stellate ganglion, hypogastric, or lumbar sympathetic block) and all sedation procedures (RFA, spinal cord stimulation trials).  Not Applicable   -IVs are not routinely placed for Dr. Moreno cervical cases   -Dr. Sparks: IVs for cervical ESIs and cervical TBDs (not CMBBs/facet inj)      If NPO for sedation, informed patient that it is okay to take medications with sips of water (except if they are to hold blood thinners).  Not Applicable   *DO take blood pressure medication if it is prescribed*      If this is for a cervical KELLI, informed patient that aspirin needs to be held for 6 days.   Not Applicable      For all patients not having spinal cord stimulator (SCS) trials or radiofrequency ablations (RFAs), informed patient:    IV sedation is not provided for this procedure.  If you feel that an oral anti-anxiety medication is needed, you can discuss this further with your referring provider or primary care provider.  The Pain Clinic provider will discuss specifics of what the procedure includes at your appointment.  Most procedures last 10-20 minutes.  We use numbing medications to help with any discomfort during the procedure.  NO      Do not schedule procedures requiring IV placement in the first appointment of the day or first appointment after lunch. Do NOT schedule at 0745, 0815 or 1245.       For patients 85 or older we recommend having an adult stay w/ them for the remainder of the day.       Does the patient have any questions?  NO  Cheryl Kaur  [facility]

## 2018-06-07 NOTE — PATIENT INSTRUCTIONS
1. Please schedule at [facility] chiropractic care with Dr. Angela Menendez      2. Please schedule your injection. Someone will contact you from the pain clinic within 24 hours to schedule.

## 2018-06-14 ENCOUNTER — RADIOLOGY INJECTION OFFICE VISIT (OUTPATIENT)
Dept: PALLIATIVE MEDICINE | Facility: CLINIC | Age: 28
End: 2018-06-14
Attending: NURSE PRACTITIONER
Payer: COMMERCIAL

## 2018-06-14 ENCOUNTER — RADIANT APPOINTMENT (OUTPATIENT)
Dept: GENERAL RADIOLOGY | Facility: CLINIC | Age: 28
End: 2018-06-14
Attending: ANESTHESIOLOGY
Payer: COMMERCIAL

## 2018-06-14 VITALS — OXYGEN SATURATION: 96 % | SYSTOLIC BLOOD PRESSURE: 106 MMHG | HEART RATE: 81 BPM | DIASTOLIC BLOOD PRESSURE: 65 MMHG

## 2018-06-14 DIAGNOSIS — S30.0XXA CONTUSION OF COCCYX, INITIAL ENCOUNTER: ICD-10-CM

## 2018-06-14 DIAGNOSIS — M46.1 SACROILIITIS (H): Primary | ICD-10-CM

## 2018-06-14 PROCEDURE — 27096 INJECT SACROILIAC JOINT: CPT | Mod: LT | Performed by: ANESTHESIOLOGY

## 2018-06-14 NOTE — PATIENT INSTRUCTIONS
[facility] Procedure Discharge Instructions    Today you saw:   Dr. Chasity Moreno      Your procedure:  Sacro-iliac joint injection    Medications used:  Lidocaine (anesthetic)  Bupivacaine (anesthetic)  Kenalog (steroid)  Omnipaque (contrast)             Be cautious when walking as numbness and/or weakness in the legs may occur up to 6-8 hours after the procedure due to effect of the local anesthetic    Do not drive for 6 hours. The effect of the local anesthetic could slow your reflexes.     Avoid strenuous activity for the first 24 hours. You may resume your regular activities after that.     You may shower, however avoid swimming, tub baths or hot tubs for 24 hours following your procedure    You may have a mild to moderate increase in pain for several days following the injection.      You may use ice packs for 10-15 minutes, 3 to 4 times a day at the injection site for comfort    Do not use heat to painful areas for 6 to 8 hours. This will give the local anesthetic time to wear off and prevent you from accidentally burning your skin.    You may use anti-inflammatory medications (such as Ibuprofen/Advil or Aleve) or Tylenol for pain control if necessary    With diabetes, check your blood sugar more frequently than usual as your blood sugar may be higher than normal for 10-14 days following a steroid injection. Contact your doctor who manages your diabetes if your blood sugar is higher than usual    It may take up to 14 days for the steroid medication to start working although you may feel the effect as early as a few days after the procedure.     Follow up with your referring provider in 2-3 weeks      If you experience any of the following, call the pain center line during work hours at [phone] or on-call physician after hours at [phone]:  -Fever over 100 degree F  -Swelling, bleeding, redness, drainage, warmth at the injection site  -Progressive weakness or numbness in your legs or  arms  -Loss of bowel or bladder function  -Unusual headache that is not relieved by Tylenol or your regular headache medication  -Unusual new onset of pain that is not improving    Phone #s:  Nurse triage line for general questions: [phone]

## 2018-06-14 NOTE — NURSING NOTE
Pre-procedure Intake    Have you been fasting? NA    If yes, for how long?     Are you taking a prescribed blood thinner such as coumadin, Plavix, Xarelto?    No    If yes, when did you take your last dose?     Do you take aspirin?  No    If cervical procedure, have you held aspirin for 6 days?   NA    Do you have any allergies to contrast dye, iodine, steroid and/or numbing medications?  NO    Are you currently taking antibiotics or have an active infection?  NO    Have you had a fever/elevated temperature within the past week? NO    Are you currently taking oral steroids? NO    Do you have a ? Yes       Are you pregnant or breastfeeding?  Unsure, chance she could be pregnant    Are the vital signs normal?  Yes

## 2018-06-14 NOTE — MR AVS SNAPSHOT
After Visit Summary   6/14/2018    Carolyn Jacobs    MRN: [id number]           Patient Information     Date Of Birth          1990        Visit Information        Provider Department      6/14/2018 2:15 PM Chasity Moreno MD [facility]        Care Instructions    [facility] Procedure Discharge Instructions    Today you saw:   Dr. Chasity Moreno      Your procedure:  Sacro-iliac joint injection    Medications used:  Lidocaine (anesthetic)  Bupivacaine (anesthetic)  Kenalog (steroid)  Omnipaque (contrast)             Be cautious when walking as numbness and/or weakness in the legs may occur up to 6-8 hours after the procedure due to effect of the local anesthetic    Do not drive for 6 hours. The effect of the local anesthetic could slow your reflexes.     Avoid strenuous activity for the first 24 hours. You may resume your regular activities after that.     You may shower, however avoid swimming, tub baths or hot tubs for 24 hours following your procedure    You may have a mild to moderate increase in pain for several days following the injection.      You may use ice packs for 10-15 minutes, 3 to 4 times a day at the injection site for comfort    Do not use heat to painful areas for 6 to 8 hours. This will give the local anesthetic time to wear off and prevent you from accidentally burning your skin.    You may use anti-inflammatory medications (such as Ibuprofen/Advil or Aleve) or Tylenol for pain control if necessary    With diabetes, check your blood sugar more frequently than usual as your blood sugar may be higher than normal for 10-14 days following a steroid injection. Contact your doctor who manages your diabetes if your blood sugar is higher than usual    It may take up to 14 days for the steroid medication to start working although you may feel the effect as early as a few days after the procedure.     Follow up with your referring provider in 2-3 weeks      If  you experience any of the following, call the pain center line during work hours at [phone] or on-call physician after hours at [phone]:  -Fever over 100 degree F  -Swelling, bleeding, redness, drainage, warmth at the injection site  -Progressive weakness or numbness in your legs or arms  -Loss of bowel or bladder function  -Unusual headache that is not relieved by Tylenol or your regular headache medication  -Unusual new onset of pain that is not improving    Phone #s:  Nurse triage line for general questions: [phone]            Follow-ups after your visit        Who to contact     If you have questions or need follow up information about today's clinic visit or your schedule please contact [facility] directly at [phone].  Normal or non-critical lab and imaging results will be communicated to you by TrovaGenehart, letter or phone within 4 business days after the clinic has received the results. If you do not hear from us within 7 days, please contact the clinic through TrovaGenehart or phone. If you have a critical or abnormal lab result, we will notify you by phone as soon as possible.  Submit refill requests through SiVerion or call your pharmacy and they will forward the refill request to us. Please allow 3 business days for your refill to be completed.          Additional Information About Your Visit        Si[employer]ion Information     [employer] gives you secure access to your electronic health record. If you see a primary care provider, you can also send messages to your care team and make appointments. If you have questions, please call your primary care clinic.  If you do not have a primary care provider, please call [phone] and they will assist you.        Care EveryWhere ID     This is your Care EveryWhere ID. This could be used by other organizations to access your [facility] medical records  [id number]        Your Vitals Were     Pulse Last Period Pulse Oximetry             82  05/21/2018 99%          Blood Pressure from Last 3 Encounters:   06/14/18 98/63   06/07/18 112/71   05/23/18 100/60    Weight from Last 3 Encounters:   06/07/18 55.8 kg (123 lb)   05/23/18 55.8 kg (123 lb)   02/19/18 63.5 kg (140 lb)              Today, you had the following     No orders found for display       Primary Care Provider Office Phone # Fax #    Arleen Temple Quyen, APRN [facility] [phone]       [address] [facility] [address]        Equal Access to Services     [facility]: Hadii aad ku hadasho Soomaali, waaxda luqadaha, qaybta kaalmada adeegyada, russel chester . So [facility] [place] [phone].    ATENCIÓN: Si habla español, tiene a redding disposición servicios gratuitos de asistencia lingüística. Llame al [phone].    We comply with applicable federal civil rights laws and [place] laws. We do not discriminate on the basis of race, color, national origin, age, disability, sex, sexual orientation, or gender identity.            Thank you!     Thank you for choosing [facility]  for your care. Our goal is always to provide you with excellent care. Hearing back from our patients is one way we can continue to improve our services. Please take a few minutes to complete the written survey that you may receive in the mail after your visit with us. Thank you!             Your Updated Medication List - Protect others around you: Learn how to safely use, store and throw away your medicines at www.disposemymeds.org.          This list is accurate as of 6/14/18  2:40 PM.  Always use your most recent med list.                   Brand Name Dispense Instructions for use Diagnosis    clonazePAM 1 MG tablet    klonoPIN    30 tablet    Take 1 tablet (1 mg) by mouth nightly as needed for anxiety Increased dose.    Postpartum anxiety       ibuprofen 800 MG tablet    ADVIL/MOTRIN    90 tablet    Take 1 tablet (800 mg) by mouth every 8 hours as needed for moderate pain     Hip pain, right, Bilateral low back pain without sciatica, unspecified chronicity       levonorgestrel 20 MCG/24HR IUD    MIRENA    1 each    1 each (20 mcg) by Intrauterine route once for 1 dose    Encounter for IUD insertion       traMADol 50 MG tablet    ULTRAM    90 tablet    Take 1 tablet (50 mg) by mouth every 8 hours as needed for pain    Hip pain, right, Acetabular labrum tear, right, subsequent encounter

## 2018-06-14 NOTE — PROGRESS NOTES
[facility] - Procedure Note    Date of Service: 6/14/2018  Procedure performed: Left sacroiliac joint injection  Diagnosis: Sacroiliac joint pain  : Chasity Moreno MD & Hilaria Urban MD (pain fellow)   Anesthesia: none    Indications: Carolyn Jacobs is a 28 year old female who is seen at the request of Arleen Napier for a sacroiliac joint injection. The patient describes low back and buttock pain radiating to the left. Exam shows full strength and sensation in both lower extremities, tenderness of the sacroiliac (SI) joint, and positive FABERE on the left. The patient reports minimal improvement with conservative treatment, including medications and PT.    Imaging:  MRI completed on 5/23/2018 showed:  FINDINGS: Five lumbar vertebrae are assumed. Vertebral body heights  and sagittal alignment are within normal limits. The conus medullaris  is unremarkable in appearance on the sagittal images. Disc signal and  disc heights appear within normal limits throughout the lumbar spine.  Apophyseal joints also appear essentially within normal limits. Marrow  signal is within normal limits. Transverse images from the inferior  aspect of the T12 vertebral body to the sacrum reveal no evidence of  lumbar disc bulge or herniation. There is no central or foraminal  stenosis.       IMPRESSION: No lumbar degenerative loss of disc signal, disc  herniation, stenosis, or apparent neural impingement.    Allergies:    No Known Allergies     Vitals:  BP 98/63  Pulse 82  LMP 05/21/2018  SpO2 99%    Options/alternatives, benefits and risks were discussed with the patient including bleeding, infection, tissue trauma, exposure to radiation, reaction to medications including seizure, nerve injury, weakness, and numbness.  Questions were answered to her satisfaction and she agrees to proceed. Voluntary informed consent was obtained and signed.     Procedure:  After getting informed consent, patient was brought  into the procedure suite and was placed in a prone position on the procedure table.   A Pause for the Cause was performed.  Patient was prepped and draped in sterile fashion.     After identifying the left SI joint, the C-arm was rotated to a obliquely to obtain the best view of the inferior angle of the joint.  A total of 2 ml of Lidocaine 1%  was used to anesthetize the skin at a skin entry site coaxial with the fluoroscopy beam at this location.  A 22 gauge 3.5 inch needle was advanced under intermittent fluoroscopy until it was felt to enter the SI joint.    A total of 1 ml of Omnipaque-300 was injected, confirming appropriate position, with spread into the intraarticular space, with no intravascular uptake noted.  9 ml of Omnipaque-300 was wasted. Location was verified in lateral view.    1 ml of 1% lidocaine, 1 ml of 0.5% bupivacaine with 40 mg of kenalog was injected.  The needle was removed. Hemostasis was achieved, the area was cleaned, and bandaids were placed when appropriate.  The patient tolerated the procedure well, and was taken to the recovery room.    Images were saved to PACS.    Pre-procedure pain score: 6/10  Post-procedure pain score: 0/10  Following today's procedure, the patient was advised to contact the [facility] for any of the following:   Fever, chills, or night sweats   New onset of pain, numbness, or weakness   Any questions/concerns regarding the procedure    -f/u with the referring provider      Chasity Moreno MD   [facility]

## 2018-06-14 NOTE — NURSING NOTE
Discharge Information    IV Discontiued Time:  NA    Amount of Fluid Infused:  NA    Discharge Criteria = When patient returns to baseline or as per MD order    Consciousness:  Pt is fully awake    Circulation:  BP +/- 20% of pre-procedure level    Respiration:  Patient is able to breathe deeply    O2 Sat:  Patient is able to maintain O2 Sat >92% on room air    Activity:  Moves 4 extremities on command    Ambulation:  Patient is able to stand and walk or stand and pivot into wheelchair    Dressing:  Clean/dry or No Dressing    Notes:   Discharge instructions and AVS given to patient    Patient meets criteria for discharge?  YES    Admitted to PCU?  No    Responsible adult present to accompany patient home?  Yes    Signature/Title:    Yara Underwood  RN Care Coordinator  [facility]

## 2018-07-05 ENCOUNTER — OFFICE VISIT (OUTPATIENT)
Dept: INTERNAL MEDICINE | Facility: CLINIC | Age: 28
End: 2018-07-05
Payer: MEDICAID

## 2018-07-05 VITALS
SYSTOLIC BLOOD PRESSURE: 100 MMHG | BODY MASS INDEX: 20.86 KG/M2 | DIASTOLIC BLOOD PRESSURE: 60 MMHG | TEMPERATURE: 98.4 F | WEIGHT: 121.5 LBS | HEART RATE: 90 BPM

## 2018-07-05 DIAGNOSIS — R51.9 SINUS HEADACHE: Primary | ICD-10-CM

## 2018-07-05 DIAGNOSIS — J01.90 ACUTE SINUSITIS WITH SYMPTOMS > 10 DAYS: ICD-10-CM

## 2018-07-05 DIAGNOSIS — G43.009 NONINTRACTABLE MIGRAINE, UNSPECIFIED MIGRAINE TYPE: ICD-10-CM

## 2018-07-05 DIAGNOSIS — B37.31 YEAST INFECTION OF THE VAGINA: ICD-10-CM

## 2018-07-05 PROCEDURE — 99214 OFFICE O/P EST MOD 30 MIN: CPT | Performed by: PHYSICIAN ASSISTANT

## 2018-07-05 RX ORDER — RIZATRIPTAN BENZOATE 5 MG/1
5-10 TABLET ORAL
Qty: 18 TABLET | Refills: 1 | Status: SHIPPED | OUTPATIENT
Start: 2018-07-05 | End: 2018-07-26

## 2018-07-05 RX ORDER — AMOXICILLIN AND CLAVULANATE POTASSIUM 500; 125 MG/1; MG/1
1 TABLET, FILM COATED ORAL 3 TIMES DAILY
Qty: 30 TABLET | Refills: 0 | Status: SHIPPED | OUTPATIENT
Start: 2018-07-05 | End: 2018-07-26

## 2018-07-05 RX ORDER — FLUCONAZOLE 150 MG/1
150 TABLET ORAL ONCE
Qty: 1 TABLET | Refills: 1 | Status: SHIPPED | OUTPATIENT
Start: 2018-07-05 | End: 2018-07-05

## 2018-07-05 NOTE — MR AVS SNAPSHOT
After Visit Summary   7/5/2018    Carolyn Jacobs    MRN: [id number]           Patient Information     Date Of Birth          1990        Visit Information        Provider Department      7/5/2018 11:00 AM Minnie Cochran PA-C [facility]        Today's Diagnoses     Sinus headache    -  1    Acute sinusitis with symptoms > 10 days        Nonintractable migraine, unspecified migraine type        Yeast infection of the vagina          Care Instructions      Sinusitis (Antibiotic Treatment)    The sinuses are air-filled spaces within the bones of the face. They connect to the inside of the nose. Sinusitis is an inflammation of the tissue that lines the sinuses. Sinusitis can occur during a cold. It can also happen due to allergies to pollens and other particles in the air. Sinusitis can cause symptoms of sinus congestion and a feeling of fullness. A sinus infection causes fever, headache, and facial pain. There is often green or yellow fluid draining from the nose or into the back of the throat (post-nasal drip). You have been given antibiotics to treat this condition.  Home care    Take the full course of antibiotics as instructed. Do not stop taking them, even when you feel better.    Drink plenty of water, hot tea, and other liquids. This may help thin nasal mucus. It also may help your sinuses drain fluids.    Heat may help soothe painful areas of your face. Use a towel soaked in hot water. Or,  the shower and direct the warm spray onto your face. Using a vaporizer along with a menthol rub at night may also help soothe symptoms.     An expectorant with guaifenesin may help thin nasal mucus and help your sinuses drain fluids.    You can use an over-the-counter decongestant, unless a similar medicine was prescribed to you. Nasal sprays work the fastest. Use one that contains phenylephrine or oxymetazoline. First blow your nose gently. Then use the spray. Do not use  these medicines more often than directed on the label. If you do, your symptoms may get worse. You may also take pills that contain pseudoephedrine. Don t use products that combine multiple medicines. This is because side effects may be increased. Read labels. You can also ask the pharmacist for help. (People with high blood pressure should not use decongestants. They can raise blood pressure.)    Over-the-counter antihistamines may help if allergies contributed to your sinusitis.      Do not use nasal rinses or irrigation during an acute sinus infection, unless your healthcare provider tells you to. Rinsing may spread the infection to other areas in your sinuses.    Use acetaminophen or ibuprofen to control pain, unless another pain medicine was prescribed to you. If you have chronic liver or kidney disease or ever had a stomach ulcer, talk with your healthcare provider before using these medicines. (Aspirin should never be taken by anyone under age 18 who is ill with a fever. It may cause severe liver damage.)    Don't smoke. This can make symptoms worse.  Follow-up care  Follow up with your healthcare provider or our staff if you are better in 1 week.  When to seek medical advice  Call your healthcare provider if any of these occur:    Facial pain or headache that gets worse    Stiff neck    Unusual drowsiness or confusion    Swelling of your forehead or eyelids    Vision problems, such as blurred or double vision    Fever of 100.4 F (38 C) or higher, or as directed by your healthcare provider    Seizure    Breathing problems    Symptoms don't go away in 10 days  Prevention  Here are steps you can take to help prevent an infection:    Keep good hand washing habits.    Don t have close contact with people who have sore throats, colds, or other upper respiratory infections.    Don t smoke, and stay away from secondhand smoke.    Stay up to date with of your vaccines.  Date Last Reviewed: 11/1/2017 2000-2017 The  [employer]. [address] [place] [address], [place] [address]. All rights reserved. This information is not intended as a substitute for professional medical care. Always follow your healthcare professional's instructions.        Preventing Migraine Headaches: Triggers  The first step in preventing migraines is to learn what triggers them. You may then be able to control your triggers to avoid or reduce the severity of your migraines.  Know your triggers  Be aware that you may have more than one trigger, and that some triggers may work together. Common migraine triggers include:    Food and nutrition. Skipping meals or not drinking enough water can trigger headaches. So can certain foods, such as caffeine, monosodium glutamate (MSG), aged cheese, or sausage.    Alcohol. Red wine and other alcoholic beverages are common migraine triggers.    Chemicals. Scents, cleaning products, gasoline, glue, perfume, and paint can be triggers. So can tobacco smoke, including secondhand smoke.    Emotions. Stress can trigger headaches or make them worse once they begin.    Sleep disruption. Staying up late, sleeping late, and traveling across time zones can disrupt your sleep cycle, triggering headaches.    Hormones. Many women notice that migraines tend to happen at a certain point in their menstrual cycle. Birth control pills or hormone replacement therapy may also trigger migraines.    Environment and weather. Air travel, changes in altitude, air pressure changes, hot sun, or bright or flashing lights can be triggers.  Control your triggers  These are some of the things you can do to try to control triggers:    Avoid triggers if you can. For example, stay clear of alcohol and foods that trigger your headaches. Use unscented household products. Keep regular sleep habits. Manage stress to help control emotional triggers.    Change your behavior at times when triggers can't be avoided. For example, make sure to get enough rest  and drink plenty of water while you're traveling. Make sure to carry a hat, sunglasses, and your medicines. Be alert for migraine symptoms, so you can treat a migraine early if it happens.  Date Last Reviewed: 10/9/2015    6652-3073 The [employer]. [address] [place] [address]. All rights reserved. This information is not intended as a substitute for professional medical care. Always follow your healthcare professional's instructions.                Follow-ups after your visit        Follow-up notes from your care team     Return if symptoms worsen or fail to improve.      Who to contact     If you have questions or need follow up information about today's clinic visit or your schedule please contact [facility] directly at [phone].  Normal or non-critical lab and imaging results will be communicated to you by Eyesquadhart, letter or phone within 4 business days after the clinic has received the results. If you do not hear from us within 7 days, please contact the clinic through Eyesquadhart or phone. If you have a critical or abnormal lab result, we will notify you by phone as soon as possible.  Submit refill requests through e-Go aeroplanes or call your pharmacy and they will forward the refill request to us. Please allow 3 business days for your refill to be completed.          Additional Information About Your Visit        Eyesquadhart Information     e-Go aeroplanes gives you secure access to your electronic health record. If you see a primary care provider, you can also send messages to your care team and make appointments. If you have questions, please call your primary care clinic.  If you do not have a primary care provider, please call [phone] and they will assist you.        Care EveryWhere ID     This is your Care EveryWhere ID. This could be used by other organizations to access your [place] medical records  [id number]        Your Vitals Were     Pulse Temperature Last Period Head  "Circumference Breastfeeding? BMI (Body Mass Index)    90 98.4  F (36.9  C) (Oral) (LMP Unknown) 64\" (162.6 cm) No 20.86 kg/m2       Blood Pressure from Last 3 Encounters:   07/05/18 100/60   06/14/18 106/65   06/07/18 112/71    Weight from Last 3 Encounters:   07/05/18 121 lb 8 oz (55.1 kg)   06/07/18 123 lb (55.8 kg)   05/23/18 123 lb (55.8 kg)              Today, you had the following     No orders found for display         Today's Medication Changes          These changes are accurate as of 7/5/18 11:35 AM.  If you have any questions, ask your nurse or doctor.               Start taking these medicines.        Dose/Directions    amoxicillin-clavulanate 500-125 MG per tablet   Commonly known as:  AUGMENTIN   Used for:  Acute sinusitis with symptoms > 10 days   Started by:  Minnie Cochran PA-C        Dose:  1 tablet   Take 1 tablet by mouth 3 times daily   Quantity:  30 tablet   Refills:  0       fluconazole 150 MG tablet   Commonly known as:  DIFLUCAN   Used for:  Yeast infection of the vagina   Started by:  Minnie Cochran PA-C        Dose:  150 mg   Take 1 tablet (150 mg) by mouth once for 1 dose   Quantity:  1 tablet   Refills:  1       rizatriptan 5 MG tablet   Commonly known as:  MAXALT   Used for:  Nonintractable migraine, unspecified migraine type   Started by:  Minnie Cochran PA-C        Dose:  5-10 mg   Take 1-2 tablets (5-10 mg) by mouth at onset of headache for migraine May repeat in 2 hours. Max 6 tablets/24 hours.   Quantity:  18 tablet   Refills:  1            Where to get your medicines      These medications were sent to [facility] [place], [place] - [address]  [address] [facility] [address]     Phone:  [phone]     amoxicillin-clavulanate 500-125 MG per tablet    fluconazole 150 MG tablet    rizatriptan 5 MG tablet                Primary Care Provider Office Phone # Fax #    JOSE Bonilla [facility] [phone]       [address] [facility] [address]        Equal Access to Services     HANSEL VERDUGO : Hadii antelmo ku hadluana Sonadiaali, waaxda luqadaha, qaybta kaalmada autumnsalazarwillie, russel holt mackaushik sandersjosedru chester . So [facility] [phone].    ATENCIÓN: Si habla español, tiene a redding disposición servicios gratuitos de asistencia lingüística. Llame al [phone].    We comply with applicable federal civil rights laws and [place] laws. We do not discriminate on the basis of race, color, national origin, age, disability, sex, sexual orientation, or gender identity.            Thank you!     Thank you for choosing [facility]  for your care. Our goal is always to provide you with excellent care. Hearing back from our patients is one way we can continue to improve our services. Please take a few minutes to complete the written survey that you may receive in the mail after your visit with us. Thank you!             Your Updated Medication List - Protect others around you: Learn how to safely use, store and throw away your medicines at www.disposemymeds.org.          This list is accurate as of 7/5/18 11:35 AM.  Always use your most recent med list.                   Brand Name Dispense Instructions for use Diagnosis    amoxicillin-clavulanate 500-125 MG per tablet    AUGMENTIN    30 tablet    Take 1 tablet by mouth 3 times daily    Acute sinusitis with symptoms > 10 days       clonazePAM 1 MG tablet    klonoPIN    30 tablet    Take 1 tablet (1 mg) by mouth nightly as needed for anxiety Increased dose.    Postpartum anxiety       fluconazole 150 MG tablet    DIFLUCAN    1 tablet    Take 1 tablet (150 mg) by mouth once for 1 dose    Yeast infection of the vagina       ibuprofen 800 MG tablet    ADVIL/MOTRIN    90 tablet    Take 1 tablet (800 mg) by mouth every 8 hours as needed for moderate pain    Hip pain, right, Bilateral low back pain without sciatica, unspecified chronicity       levonorgestrel 20 MCG/24HR IUD     MIRENA    1 each    1 each (20 mcg) by Intrauterine route once for 1 dose    Encounter for IUD insertion       rizatriptan 5 MG tablet    MAXALT    18 tablet    Take 1-2 tablets (5-10 mg) by mouth at onset of headache for migraine May repeat in 2 hours. Max 6 tablets/24 hours.    Nonintractable migraine, unspecified migraine type

## 2018-07-05 NOTE — PATIENT INSTRUCTIONS
Sinusitis (Antibiotic Treatment)    The sinuses are air-filled spaces within the bones of the face. They connect to the inside of the nose. Sinusitis is an inflammation of the tissue that lines the sinuses. Sinusitis can occur during a cold. It can also happen due to allergies to pollens and other particles in the air. Sinusitis can cause symptoms of sinus congestion and a feeling of fullness. A sinus infection causes fever, headache, and facial pain. There is often green or yellow fluid draining from the nose or into the back of the throat (post-nasal drip). You have been given antibiotics to treat this condition.  Home care    Take the full course of antibiotics as instructed. Do not stop taking them, even when you feel better.    Drink plenty of water, hot tea, and other liquids. This may help thin nasal mucus. It also may help your sinuses drain fluids.    Heat may help soothe painful areas of your face. Use a towel soaked in hot water. Or,  the shower and direct the warm spray onto your face. Using a vaporizer along with a menthol rub at night may also help soothe symptoms.     An expectorant with guaifenesin may help thin nasal mucus and help your sinuses drain fluids.    You can use an over-the-counter decongestant, unless a similar medicine was prescribed to you. Nasal sprays work the fastest. Use one that contains phenylephrine or oxymetazoline. First blow your nose gently. Then use the spray. Do not use these medicines more often than directed on the label. If you do, your symptoms may get worse. You may also take pills that contain pseudoephedrine. Don t use products that combine multiple medicines. This is because side effects may be increased. Read labels. You can also ask the pharmacist for help. (People with high blood pressure should not use decongestants. They can raise blood pressure.)    Over-the-counter antihistamines may help if allergies contributed to your sinusitis.      Do not use  nasal rinses or irrigation during an acute sinus infection, unless your healthcare provider tells you to. Rinsing may spread the infection to other areas in your sinuses.    Use acetaminophen or ibuprofen to control pain, unless another pain medicine was prescribed to you. If you have chronic liver or kidney disease or ever had a stomach ulcer, talk with your healthcare provider before using these medicines. (Aspirin should never be taken by anyone under age 18 who is ill with a fever. It may cause severe liver damage.)    Don't smoke. This can make symptoms worse.  Follow-up care  Follow up with your healthcare provider or our staff if you are better in 1 week.  When to seek medical advice  Call your healthcare provider if any of these occur:    Facial pain or headache that gets worse    Stiff neck    Unusual drowsiness or confusion    Swelling of your forehead or eyelids    Vision problems, such as blurred or double vision    Fever of 100.4 F (38 C) or higher, or as directed by your healthcare provider    Seizure    Breathing problems    Symptoms don't go away in 10 days  Prevention  Here are steps you can take to help prevent an infection:    Keep good hand washing habits.    Don t have close contact with people who have sore throats, colds, or other upper respiratory infections.    Don t smoke, and stay away from secondhand smoke.    Stay up to date with of your vaccines.  Date Last Reviewed: 11/1/2017 2000-2017 The [employer]. [address] [place] [address], [place] [address]. All rights reserved. This information is not intended as a substitute for professional medical care. Always follow your healthcare professional's instructions.        Preventing Migraine Headaches: Triggers  The first step in preventing migraines is to learn what triggers them. You may then be able to control your triggers to avoid or reduce the severity of your migraines.  Know your triggers  Be aware that you may have more  than one trigger, and that some triggers may work together. Common migraine triggers include:    Food and nutrition. Skipping meals or not drinking enough water can trigger headaches. So can certain foods, such as caffeine, monosodium glutamate (MSG), aged cheese, or sausage.    Alcohol. Red wine and other alcoholic beverages are common migraine triggers.    Chemicals. Scents, cleaning products, gasoline, glue, perfume, and paint can be triggers. So can tobacco smoke, including secondhand smoke.    Emotions. Stress can trigger headaches or make them worse once they begin.    Sleep disruption. Staying up late, sleeping late, and traveling across time zones can disrupt your sleep cycle, triggering headaches.    Hormones. Many women notice that migraines tend to happen at a certain point in their menstrual cycle. Birth control pills or hormone replacement therapy may also trigger migraines.    Environment and weather. Air travel, changes in altitude, air pressure changes, hot sun, or bright or flashing lights can be triggers.  Control your triggers  These are some of the things you can do to try to control triggers:    Avoid triggers if you can. For example, stay clear of alcohol and foods that trigger your headaches. Use unscented household products. Keep regular sleep habits. Manage stress to help control emotional triggers.    Change your behavior at times when triggers can't be avoided. For example, make sure to get enough rest and drink plenty of water while you're traveling. Make sure to carry a hat, sunglasses, and your medicines. Be alert for migraine symptoms, so you can treat a migraine early if it happens.  Date Last Reviewed: 10/9/2015    9246-2719 [employer]. [address] [facility], [place] [address]. All rights reserved. This information is not intended as a substitute for professional medical care. Always follow your healthcare professional's instructions.

## 2018-07-05 NOTE — PROGRESS NOTES
"  SUBJECTIVE:   Carolyn Jacobs is a 28 year old female who presents to clinic today for the following health issues:      -per patient has been having migraines every single night, believes it is due to cold. Patient also states, she has been sick for about a year since having her daughter.; Fevers have been 100 (O) patients states fevers break during the night.      Acute Illness   Acute illness concerns: frontal headaches, cold symptoms   Onset: several months    Fever: YES    Chills/Sweats: YES    Headache (location?): YES- forehead and behind eyes     Sinus Pressure:YES    Conjunctivitis:  no    Ear Pain: YES- fullness     Rhinorrhea: YES    Congestion: YES    Sore Throat: no      Cough: no    Wheeze: no     Decreased Appetite: no    Nausea: YES- with headaches    Vomiting: no    Diarrhea:  no    Dysuria/Freq.: no    Fatigue/Achiness: no    Sick/Strep Exposure: no     Therapies Tried and outcome: Patient has been using Claritin and Sudafed for sinus symptoms without complete resolution      Problem list and histories reviewed & adjusted, as indicated.  Additional history: as documented    BP Readings from Last 3 Encounters:   07/05/18 100/60   06/14/18 106/65   06/07/18 112/71    Wt Readings from Last 3 Encounters:   07/05/18 121 lb 8 oz (55.1 kg)   06/07/18 123 lb (55.8 kg)   05/23/18 123 lb (55.8 kg)                    Reviewed and updated as needed this visit by clinical staff       Reviewed and updated as needed this visit by Provider         ROS:  Constitutional, HEENT, cardiovascular, pulmonary, gi and gu systems are negative, except as otherwise noted.    OBJECTIVE:     /60 (BP Location: Left arm, Patient Position: Chair, Cuff Size: Adult Regular)  Pulse 90  Temp 98.4  F (36.9  C) (Oral)  Wt 121 lb 8 oz (55.1 kg)  LMP  (LMP Unknown)  HC 64\" (162.6 cm)  Breastfeeding? No  BMI 20.86 kg/m2  Body mass index is 20.86 kg/(m^2).  GENERAL: alert and no distress  EYES: Eyes grossly normal to " inspection, PERRL and conjunctivae and sclerae normal  HENT: normal cephalic/atraumatic, both ears: clear effusion, rhinorrhea clear, oropharynx clear, oral mucous membranes moist and sinuses: frontal, ethmoid tenderness on both sides  NECK: no adenopathy, no asymmetry, masses, or scars and thyroid normal to palpation  RESP: lungs clear to auscultation - no rales, rhonchi or wheezes  CV: regular rates and rhythm, no murmur, click or rub, peripheral pulses strong and no peripheral edema  MS: no gross musculoskeletal defects noted, no edema  SKIN: no suspicious lesions or rashes    Diagnostic Test Results:  none     ASSESSMENT/PLAN:       ICD-10-CM    1. Sinus headache R51    2. Acute sinusitis with symptoms > 10 days J01.90 amoxicillin-clavulanate (AUGMENTIN) 500-125 MG per tablet   3. Nonintractable migraine, unspecified migraine type G43.009 rizatriptan (MAXALT) 5 MG tablet   4. Yeast infection of the vagina B37.3 fluconazole (DIFLUCAN) 150 MG tablet       I discussed daily headache and cold and sinus symptoms with patient. I will treat for sinus infection and also prescribe prn migraine medication. If she is having persisting symptoms or new or worsening symptoms I would have her follow up for further evaluation.   For recurrent yeast infection and yeast infection associated with antibiotic I will prescribe diflucan.   See Patient Instructions    Minnie Cochran PA-C  [facility]

## 2018-07-16 ENCOUNTER — TELEPHONE (OUTPATIENT)
Dept: INTERNAL MEDICINE | Facility: CLINIC | Age: 28
End: 2018-07-16

## 2018-07-16 DIAGNOSIS — R51.9 NONINTRACTABLE HEADACHE, UNSPECIFIED CHRONICITY PATTERN, UNSPECIFIED HEADACHE TYPE: Primary | ICD-10-CM

## 2018-07-16 NOTE — TELEPHONE ENCOUNTER
Reason for Call:  Other prescription    Detailed comments: dr. healy wrote rx for med that is too expensive. Insurance hasn't gone thru yet.    Can she write one for cheapter med?  [facility]     Phone Number Patient can be reached at: Home number on file [phone] (home)    Best Time: asap    Can we leave a detailed message on this number? YES    Call taken on 7/16/2018 at 3:59 PM by Sharmila Whyte

## 2018-07-17 RX ORDER — SUMATRIPTAN 25 MG/1
25-50 TABLET, FILM COATED ORAL
Qty: 9 TABLET | Refills: 1 | Status: SHIPPED | OUTPATIENT
Start: 2018-07-17 | End: 2024-05-16

## 2018-07-17 NOTE — TELEPHONE ENCOUNTER
Call received from patient. States that Rizatriptan is the prescription that is too expensive. States that it is $115. Patient states that she would prefer a migraine medication but the pharmacy told her that migraine medications are really expensive. Asking if provider has any other suggestions for something she could take. She is wondering about even a pain medication as an alternative. Please advise.

## 2018-07-17 NOTE — TELEPHONE ENCOUNTER
I will send an alternate migraine medication and would have her follow up if symptoms are persisting or worsening.

## 2018-07-17 NOTE — TELEPHONE ENCOUNTER
Left voice message for patient to call back with name of medication that is too expensive - likely is the Rizatriptan, but want to confirm this with patient prior to sending message to provider.

## 2018-07-26 ENCOUNTER — OFFICE VISIT (OUTPATIENT)
Dept: INTERNAL MEDICINE | Facility: CLINIC | Age: 28
End: 2018-07-26
Payer: MEDICAID

## 2018-07-26 VITALS
OXYGEN SATURATION: 100 % | WEIGHT: 123.8 LBS | RESPIRATION RATE: 17 BRPM | DIASTOLIC BLOOD PRESSURE: 62 MMHG | BODY MASS INDEX: 21.13 KG/M2 | TEMPERATURE: 98.5 F | HEART RATE: 79 BPM | HEIGHT: 64 IN | SYSTOLIC BLOOD PRESSURE: 110 MMHG

## 2018-07-26 DIAGNOSIS — R53.83 FATIGUE, UNSPECIFIED TYPE: Primary | ICD-10-CM

## 2018-07-26 DIAGNOSIS — R51.9 FREQUENT HEADACHES: ICD-10-CM

## 2018-07-26 LAB
BASOPHILS # BLD AUTO: 0 10E9/L (ref 0–0.2)
BASOPHILS NFR BLD AUTO: 0.4 %
CRP SERPL-MCNC: <2.9 MG/L (ref 0–8)
DIFFERENTIAL METHOD BLD: NORMAL
EOSINOPHIL # BLD AUTO: 0 10E9/L (ref 0–0.7)
EOSINOPHIL NFR BLD AUTO: 0.4 %
ERYTHROCYTE [DISTWIDTH] IN BLOOD BY AUTOMATED COUNT: 13.2 % (ref 10–15)
HCT VFR BLD AUTO: 39.6 % (ref 35–47)
HGB BLD-MCNC: 12.9 G/DL (ref 11.7–15.7)
LYMPHOCYTES # BLD AUTO: 2.1 10E9/L (ref 0.8–5.3)
LYMPHOCYTES NFR BLD AUTO: 24.9 %
MCH RBC QN AUTO: 31.2 PG (ref 26.5–33)
MCHC RBC AUTO-ENTMCNC: 32.6 G/DL (ref 31.5–36.5)
MCV RBC AUTO: 96 FL (ref 78–100)
MONOCYTES # BLD AUTO: 0.5 10E9/L (ref 0–1.3)
MONOCYTES NFR BLD AUTO: 6.4 %
NEUTROPHILS # BLD AUTO: 5.7 10E9/L (ref 1.6–8.3)
NEUTROPHILS NFR BLD AUTO: 67.9 %
PLATELET # BLD AUTO: 201 10E9/L (ref 150–450)
RBC # BLD AUTO: 4.14 10E12/L (ref 3.8–5.2)
WBC # BLD AUTO: 8.4 10E9/L (ref 4–11)

## 2018-07-26 PROCEDURE — 99000 SPECIMEN HANDLING OFFICE-LAB: CPT | Performed by: PHYSICIAN ASSISTANT

## 2018-07-26 PROCEDURE — 86666 EHRLICHIA ANTIBODY: CPT | Mod: 90 | Performed by: PHYSICIAN ASSISTANT

## 2018-07-26 PROCEDURE — 86140 C-REACTIVE PROTEIN: CPT | Performed by: PHYSICIAN ASSISTANT

## 2018-07-26 PROCEDURE — 85025 COMPLETE CBC W/AUTO DIFF WBC: CPT | Performed by: PHYSICIAN ASSISTANT

## 2018-07-26 PROCEDURE — 84443 ASSAY THYROID STIM HORMONE: CPT | Performed by: PHYSICIAN ASSISTANT

## 2018-07-26 PROCEDURE — 87798 DETECT AGENT NOS DNA AMP: CPT | Mod: 90 | Performed by: PHYSICIAN ASSISTANT

## 2018-07-26 PROCEDURE — 86618 LYME DISEASE ANTIBODY: CPT | Performed by: PHYSICIAN ASSISTANT

## 2018-07-26 PROCEDURE — 99214 OFFICE O/P EST MOD 30 MIN: CPT | Performed by: PHYSICIAN ASSISTANT

## 2018-07-26 PROCEDURE — 36415 COLL VENOUS BLD VENIPUNCTURE: CPT | Performed by: PHYSICIAN ASSISTANT

## 2018-07-26 NOTE — PATIENT INSTRUCTIONS
I will get some lab work to further evaluate fatigue and generalized symptoms. I will also have you see neurology for evaluation of mental fog and recurrent headaches.

## 2018-07-26 NOTE — MR AVS SNAPSHOT
After Visit Summary   7/26/2018    Carolyn Jacobs    MRN: [id number]           Patient Information     Date Of Birth          1990        Visit Information        Provider Department      7/26/2018 10:30 AM Minnie Cochran PA-C [facility]        Today's Diagnoses     Fatigue, unspecified type    -  1    Frequent headaches          Care Instructions    I will get some lab work to further evaluate fatigue and generalized symptoms. I will also have you see neurology for evaluation of mental fog and recurrent headaches.           Follow-ups after your visit        Additional Services     NEUROLOGY ADULT REFERRAL       Your provider has referred you for the following:   Consult at [facility]: [facility] [phone]   [email or web address]    Please be aware that coverage of these services is subject to the terms and limitations of your health insurance plan.  Call member services at your health plan with any benefit or coverage questions.      Please bring the following with you to your appointment:    (1) Any X-Rays, CTs or MRIs which have been performed.  Contact the facility where they were done to arrange for  prior to your scheduled appointment.    (2) List of current medications  (3) This referral request   (4) Any documents/labs given to you for this referral                  Follow-up notes from your care team     Return if symptoms worsen or fail to improve.      Your next 10 appointments already scheduled     Aug 02, 2018 11:20 AM CDT   SHORT with JOSE Bonilla CNP   [facility] ([facility])    [address]  [facility] [id number] [phone]              Who to contact     If you have questions or need follow up information about today's clinic visit or your schedule please contact [facility] directly at  "[phone].  Normal or non-critical lab and imaging results will be communicated to you by [employer], letter or phone within 4 business days after the clinic has received the results. If you do not hear from us within 7 days, please contact the clinic through [employer] or phone. If you have a critical or abnormal lab result, we will notify you by phone as soon as possible.  Submit refill requests through [employer] or call your pharmacy and they will forward the refill request to us. Please allow 3 business days for your refill to be completed.          Additional Information About Your Visit        [employer] Information     [employer] gives you secure access to your electronic health record. If you see a primary care provider, you can also send messages to your care team and make appointments. If you have questions, please call your primary care clinic.  If you do not have a primary care provider, please call [phone] and they will assist you.        Care EveryWhere ID     This is your Care EveryWhere ID. This could be used by other organizations to access your [place] medical records  [id number]        Your Vitals Were     Pulse Temperature Respirations Height Last Period Pulse Oximetry    79 98.5  F (36.9  C) (Oral) 17 5' 4\" (1.626 m) (LMP Unknown) 100%    BMI (Body Mass Index)                   21.25 kg/m2            Blood Pressure from Last 3 Encounters:   07/26/18 110/62   07/05/18 100/60   06/14/18 106/65    Weight from Last 3 Encounters:   07/26/18 123 lb 12.8 oz (56.2 kg)   07/05/18 121 lb 8 oz (55.1 kg)   06/07/18 123 lb (55.8 kg)              We Performed the Following     Anaplasma phagocytoph antibody IgG IgM     CBC with platelets and differential     CRP, inflammation     Ehrlichia Anaplasma Sp by PCR     Lyme Disease Yaneli with reflex to WB Serum     NEUROLOGY ADULT REFERRAL     TSH with free T4 reflex        Primary Care Provider Office Phone # Fax #    JOSE Bonilla [phone] " [phone]       [address] [facility] [address]        Equal Access to Services     HANSEL VERDUGO : Hadii aad ku hadketuraho Sonadiaali, waaxda luqadaha, qaybta kaalmada adejudith, russel kenyettain hayaakaushik leyvaarun hardy henrik martinez. So [facility] [phone].    ATENCIÓN: Si habla español, tiene a redding disposición servicios gratuitos de asistencia lingüística. Misti al [phone].    We comply with applicable federal civil rights laws and [place] laws. We do not discriminate on the basis of race, color, national origin, age, disability, sex, sexual orientation, or gender identity.            Thank you!     Thank you for choosing [facility]  for your care. Our goal is always to provide you with excellent care. Hearing back from our patients is one way we can continue to improve our services. Please take a few minutes to complete the written survey that you may receive in the mail after your visit with us. Thank you!             Your Updated Medication List - Protect others around you: Learn how to safely use, store and throw away your medicines at www.disposemymeds.org.          This list is accurate as of 7/26/18 10:52 AM.  Always use your most recent med list.                   Brand Name Dispense Instructions for use Diagnosis    clonazePAM 1 MG tablet    klonoPIN    30 tablet    Take 1 tablet (1 mg) by mouth nightly as needed for anxiety Increased dose.    Postpartum anxiety       ibuprofen 800 MG tablet    ADVIL/MOTRIN    90 tablet    Take 1 tablet (800 mg) by mouth every 8 hours as needed for moderate pain    Hip pain, right, Bilateral low back pain without sciatica, unspecified chronicity       levonorgestrel 20 MCG/24HR IUD    MIRENA    1 each    1 each (20 mcg) by Intrauterine route once for 1 dose    Encounter for IUD insertion       SUMAtriptan 25 MG tablet    IMITREX    9 tablet    Take 1-2 tablets (25-50 mg) by mouth at onset of headache for migraine May repeat in 2 hours. Max 8 tablets/24  hours.    Nonintractable headache, unspecified chronicity pattern, unspecified headache type

## 2018-07-26 NOTE — PROGRESS NOTES
"  SUBJECTIVE:   Carolyn Jacobs is a 28 year old female who presents to clinic today for the following health issues:    Patient is here in clinic for follow up on headaches and generalized fatigue. She was seen by me earlier in the month and treated for migraines and a sinus infection. She has had some improvement in her headaches but has persistent fatigue and lack of energy. She denies any joint swelling, skin changes, GI changes or possibility of pregnancy. She reports that she overall feels tired all the time and that she has episodes that feel like a film comes down over her eyes and she has acute fatigue with that. She denies any known tick exposure but does spend time outdoors almost daily. She denies any chest pain, cough, palpitations, weight changes or night sweats but does report history of fevers. She says she is concerned there is something seriously wrong with her since she has been sick so long and is not getting better.     -------------------------------------    Problem list and histories reviewed & adjusted, as indicated.  Additional history: as documented    BP Readings from Last 3 Encounters:   07/26/18 110/62   07/05/18 100/60   06/14/18 106/65    Wt Readings from Last 3 Encounters:   07/26/18 123 lb 12.8 oz (56.2 kg)   07/05/18 121 lb 8 oz (55.1 kg)   06/07/18 123 lb (55.8 kg)          Reviewed and updated as needed this visit by clinical staff  Tobacco  Allergies  Meds  Med Hx  Surg Hx  Fam Hx  Soc Hx      Reviewed and updated as needed this visit by Provider         ROS:  Constitutional, HEENT, cardiovascular, pulmonary, GI, , musculoskeletal, neuro, skin, endocrine and psych systems are negative, except as otherwise noted.    OBJECTIVE:     /62 (BP Location: Left arm, Patient Position: Chair, Cuff Size: Adult Regular)  Pulse 79  Temp 98.5  F (36.9  C) (Oral)  Resp 17  Ht 5' 4\" (1.626 m)  Wt 123 lb 12.8 oz (56.2 kg)  LMP  (LMP Unknown)  SpO2 100%  BMI 21.25 " kg/m2  Body mass index is 21.25 kg/(m^2).  GENERAL: alert, no distress and fit  EYES: Eyes grossly normal to inspection, PERRL and conjunctivae and sclerae normal  NECK: no adenopathy, no asymmetry, masses, or scars and thyroid normal to palpation  RESP: lungs clear to auscultation - no rales, rhonchi or wheezes  CV: regular rate and rhythm, normal S1 S2, no S3 or S4, no murmur, click or rub, no peripheral edema and peripheral pulses strong  ABDOMEN: soft, nontender and bowel sounds normal  MS: no gross musculoskeletal defects noted, no edema  SKIN: no suspicious lesions or rashes  PSYCH: mentation appears normal, affect normal/bright and anxious  LYMPH: no cervical, supraclavicular, axillary, or inguinal adenopathy    Diagnostic Test Results:  Labs pending    ASSESSMENT/PLAN:       ICD-10-CM    1. Fatigue, unspecified type R53.83 CBC with platelets and differential     TSH with free T4 reflex     Lyme Disease Yaneli with reflex to WB Serum     CRP, inflammation     Ehrlichia Anaplasma Sp by PCR     Anaplasma phagocytoph antibody IgG IgM     NEUROLOGY ADULT REFERRAL   2. Frequent headaches R51 CBC with platelets and differential     TSH with free T4 reflex     Lyme Disease Yaneli with reflex to WB Serum     CRP, inflammation     Ehrlichia Anaplasma Sp by PCR     Anaplasma phagocytoph antibody IgG IgM     NEUROLOGY ADULT REFERRAL       I will get lab work today to further evaluate patients fatigue and will follow up with results.   For frequent headaches and foggy spells I will have her see neurology for further evaluation.   See Patient Instructions    Minnie Cochran PA-C  [facility]

## 2018-07-27 LAB
A PHAGOCYTOPH DNA BLD QL NAA+PROBE: NOT DETECTED
B BURGDOR IGG+IGM SER QL: 0.11 (ref 0–0.89)
E CHAFFEENSIS DNA BLD QL NAA+PROBE: NOT DETECTED
E EWINGII DNA SPEC QL NAA+PROBE: NOT DETECTED
EHRLICHIA DNA SPEC QL NAA+PROBE: NOT DETECTED
TSH SERPL DL<=0.005 MIU/L-ACNC: 1.29 MU/L (ref 0.4–4)

## 2018-07-28 LAB
A PHAGOCYTOPH IGG TITR SER IF: NORMAL {TITER}
A PHAGOCYTOPH IGM TITR SER IF: NORMAL {TITER}

## 2018-08-01 ENCOUNTER — TELEPHONE (OUTPATIENT)
Dept: PSYCHIATRY | Facility: CLINIC | Age: 28
End: 2018-08-01

## 2018-08-01 ENCOUNTER — TRANSFERRED RECORDS (OUTPATIENT)
Dept: HEALTH INFORMATION MANAGEMENT | Facility: CLINIC | Age: 28
End: 2018-08-01

## 2018-08-01 NOTE — TELEPHONE ENCOUNTER
Reason for call:  Other   Patient called regarding (reason for call): call back  Additional comments: Client logged on to [employer] and noticed that she had been giving a diagnosis that she was not aware of and would like to discuss this.  Please call.    Phone number to reach patient:  Home number on file [phone] (home)    Best Time:  anytime    Can we leave a detailed message on this number?  YES

## 2018-08-01 NOTE — TELEPHONE ENCOUNTER
Left VM patient to return call.  We can provide Medical Records telephone number ([phone]) to patient to dispute diagnosis.

## 2018-08-02 ENCOUNTER — OFFICE VISIT (OUTPATIENT)
Dept: INTERNAL MEDICINE | Facility: CLINIC | Age: 28
End: 2018-08-02
Payer: COMMERCIAL

## 2018-08-02 VITALS
HEIGHT: 64 IN | DIASTOLIC BLOOD PRESSURE: 62 MMHG | HEART RATE: 74 BPM | BODY MASS INDEX: 20.83 KG/M2 | SYSTOLIC BLOOD PRESSURE: 98 MMHG | OXYGEN SATURATION: 100 % | RESPIRATION RATE: 16 BRPM | WEIGHT: 122 LBS | TEMPERATURE: 98.5 F

## 2018-08-02 DIAGNOSIS — F41.1 GAD (GENERALIZED ANXIETY DISORDER): Primary | ICD-10-CM

## 2018-08-02 PROCEDURE — 99213 OFFICE O/P EST LOW 20 MIN: CPT | Performed by: NURSE PRACTITIONER

## 2018-08-02 RX ORDER — CLONAZEPAM 1 MG/1
1 TABLET ORAL
Qty: 30 TABLET | Refills: 3 | Status: SHIPPED | OUTPATIENT
Start: 2018-08-02 | End: 2018-11-13

## 2018-08-02 NOTE — NURSING NOTE
"Chief Complaint   Patient presents with     Recheck Medication     follow up on her meds has update     initial BP 98/62  Pulse 74  Temp 98.5  F (36.9  C) (Oral)  Resp 16  Ht 5' 4\" (1.626 m)  Wt 122 lb (55.3 kg)  LMP  (LMP Unknown)  SpO2 100%  BMI 20.94 kg/m2 Estimated body mass index is 20.94 kg/(m^2) as calculated from the following:    Height as of this encounter: 5' 4\" (1.626 m).    Weight as of this encounter: 122 lb (55.3 kg)..  bp completed using cuff size regular  ORA GURROLA LPN  "

## 2018-08-02 NOTE — PROGRESS NOTES
.  SUBJECTIVE:   Carolyn Jacobs is a 28 year old female who presents to clinic today for the following health issues:  Chief Complaint   Patient presents with     Recheck Medication     follow up on her meds has update   Saw neurology - brain fog and headache  Needs to get tilt test done    Not sure   Suggested dome dietary changes and exercise     Back has been excellent since injection     Needs refill on clonazepam   Using prn               Problem list and histories reviewed & adjusted, as indicated.  Additional history: as documented    Patient Active Problem List   Diagnosis     CARDIOVASCULAR SCREENING; LDL GOAL LESS THAN 160     Acetabular labrum tear     Migraine     REILLY (generalized anxiety disorder)     Severe episode of recurrent major depressive disorder, without psychotic features (H)     Attention disturbance     Exercise-induced asthma     Current smoker     Controlled substance agreement signed     Mirena IUD insertion     Past Surgical History:   Procedure Laterality Date     C INDUCED ABORTN BY DIL/EVAC  2005     wisdom teeth         Social History   Substance Use Topics     Smoking status: Current Some Day Smoker     Packs/day: 0.25     Years: 1.00     Types: Cigarettes     Last attempt to quit: 10/19/2014     Smokeless tobacco: Former User      Comment: 5 cigs per day     Alcohol use No     Family History   Problem Relation Age of Onset     Hypertension Mother      Neurologic Disorder Mother      migraine headache     Depression Sister      Depression Brother      Cerebrovascular Disease Maternal Grandfather      No Known Problems Son      No Known Problems Daughter            Reviewed and updated as needed this visit by clinical staff  Tobacco  Allergies  Meds  Med Hx  Surg Hx  Fam Hx  Soc Hx      Reviewed and updated as needed this visit by Provider         ROS:  Constitutional, HEENT, cardiovascular, pulmonary, gi and gu systems are negative, except as otherwise noted.    OBJECTIVE:  "    BP 98/62  Pulse 74  Temp 98.5  F (36.9  C) (Oral)  Resp 16  Ht 5' 4\" (1.626 m)  Wt 122 lb (55.3 kg)  LMP  (LMP Unknown)  SpO2 100%  BMI 20.94 kg/m2  Body mass index is 20.94 kg/(m^2).  GENERAL: alert and no distress  RESP: lungs clear to auscultation - no rales, rhonchi or wheezes  CV: regular rate and rhythm  MS: no gross musculoskeletal defects noted, no edema  NEURO: Normal strength and tone, mentation intact and speech normal  PSYCH: mentation appears normal, affect normal/bright    Diagnostic Test Results:  RIELLY-7 SCORE 6/15/2017 7/13/2017 1/24/2018   Total Score - - -   Total Score - - 18 (severe anxiety)   Total Score 21 21 18           ASSESSMENT/PLAN:             1. Anxiety   Using prn   Has sone with autism and keeps her busy   Daughter acting out in anger more and speaking some words now - 18 months old - plans to talk with peds regarding anger issues - discussed may be mimicing her brothers outbursts    - clonazePAM (KLONOPIN) 1 MG tablet; Take 1 tablet (1 mg) by mouth nightly as needed for anxiety Increased dose.  Dispense: 30 tablet; Refill: 3    Patient Instructions   Refill on medication       JOSE Bonilla [facility]      "

## 2018-08-02 NOTE — MR AVS SNAPSHOT
"              After Visit Summary   8/2/2018    Carolyn Jacobs    MRN: [id number]           Patient Information     Date Of Birth          1990        Visit Information        Provider Department      8/2/2018 11:20 AM Arleen Napier APRN CNP [facility]        Today's Diagnoses     REILLY (generalized anxiety disorder)    -  1      Care Instructions    Refill on medication           Follow-ups after your visit        Who to contact     If you have questions or need follow up information about today's clinic visit or your schedule please contact [facility] directly at [phone].  Normal or non-critical lab and imaging results will be communicated to you by [employer], letter or phone within 4 business days after the clinic has received the results. If you do not hear from us within 7 days, please contact the clinic through [employer] or phone. If you have a critical or abnormal lab result, we will notify you by phone as soon as possible.  Submit refill requests through [employer] or call your pharmacy and they will forward the refill request to us. Please allow 3 business days for your refill to be completed.          Additional Information About Your Visit        MyChart Information     [employer] gives you secure access to your electronic health record. If you see a primary care provider, you can also send messages to your care team and make appointments. If you have questions, please call your primary care clinic.  If you do not have a primary care provider, please call [phone] and they will assist you.        Care EveryWhere ID     This is your Care EveryWhere ID. This could be used by other organizations to access your [place] medical records  [id number]        Your Vitals Were     Pulse Temperature Respirations Height Last Period Pulse Oximetry    74 98.5  F (36.9  C) (Oral) 16 5' 4\" (1.626 m) (LMP Unknown) 100%    BMI (Body Mass Index)                   20.94 kg/m2    "         Blood Pressure from Last 3 Encounters:   08/02/18 98/62   07/26/18 110/62   07/05/18 100/60    Weight from Last 3 Encounters:   08/02/18 122 lb (55.3 kg)   07/26/18 123 lb 12.8 oz (56.2 kg)   07/05/18 121 lb 8 oz (55.1 kg)              Today, you had the following     No orders found for display         Where to get your medicines      Some of these will need a paper prescription and others can be bought over the counter.  Ask your nurse if you have questions.     Bring a paper prescription for each of these medications     clonazePAM 1 MG tablet          Primary Care Provider Office Phone # Fax #    Arleen WaggonerJOSE Bernal [facility] [phone]       [address] [facility] [address]        Equal Access to Services     HANSEL VERDUGO : Jaci alexis Soelen, waaxda luqadaha, qaybta kaalmada adejudith, russel chester . So [facility] [phone].    ATENCIÓN: Si habla español, tiene a redding disposición servicios gratuitos de asistencia lingüística. Misti al [phone].    We comply with applicable federal civil rights laws and [place] laws. We do not discriminate on the basis of race, color, national origin, age, disability, sex, sexual orientation, or gender identity.            Thank you!     Thank you for choosing [facility]  for your care. Our goal is always to provide you with excellent care. Hearing back from our patients is one way we can continue to improve our services. Please take a few minutes to complete the written survey that you may receive in the mail after your visit with us. Thank you!             Your Updated Medication List - Protect others around you: Learn how to safely use, store and throw away your medicines at www.disposemymeds.org.          This list is accurate as of 8/2/18  1:08 PM.  Always use your most recent med list.                   Brand Name Dispense Instructions for use Diagnosis    clonazePAM 1 MG tablet     klonoPIN    30 tablet    Take 1 tablet (1 mg) by mouth nightly as needed for anxiety Increased dose.    REILLY (generalized anxiety disorder)       levonorgestrel 20 MCG/24HR IUD    MIRENA    1 each    1 each (20 mcg) by Intrauterine route once for 1 dose    Encounter for IUD insertion       SUMAtriptan 25 MG tablet    IMITREX    9 tablet    Take 1-2 tablets (25-50 mg) by mouth at onset of headache for migraine May repeat in 2 hours. Max 8 tablets/24 hours.    Nonintractable headache, unspecified chronicity pattern, unspecified headache type

## 2018-09-05 ENCOUNTER — TRANSFERRED RECORDS (OUTPATIENT)
Dept: HEALTH INFORMATION MANAGEMENT | Facility: CLINIC | Age: 28
End: 2018-09-05

## 2018-09-11 ENCOUNTER — TELEPHONE (OUTPATIENT)
Dept: INTERNAL MEDICINE | Facility: CLINIC | Age: 28
End: 2018-09-11

## 2018-09-11 DIAGNOSIS — F41.1 GAD (GENERALIZED ANXIETY DISORDER): ICD-10-CM

## 2018-09-11 RX ORDER — CLONAZEPAM 1 MG/1
1 TABLET ORAL
Qty: 30 TABLET | Refills: 3 | Status: CANCELLED | OUTPATIENT
Start: 2018-09-11
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
## 2018-09-11 NOTE — TELEPHONE ENCOUNTER
If she picked up 9/7, she will not be able to refill for a month from that date   We are unable to replace lost or stolen medication
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
## 2018-10-12 ENCOUNTER — TELEPHONE (OUTPATIENT)
Dept: INTERNAL MEDICINE | Facility: CLINIC | Age: 28
End: 2018-10-12

## 2018-10-12 DIAGNOSIS — M54.50 LOW BACK PAIN, NON-SPECIFIC: Primary | ICD-10-CM

## 2018-10-12 RX ORDER — HYDROCODONE BITARTRATE AND ACETAMINOPHEN 5; 325 MG/1; MG/1
1 TABLET ORAL EVERY 12 HOURS PRN
Qty: 60 TABLET | Refills: 0 | Status: SHIPPED | OUTPATIENT
Start: 2018-10-12 | End: 2019-01-17

## 2018-10-12 NOTE — TELEPHONE ENCOUNTER
Reason for Call:  Medication or medication refill:    Do you use a [facility]?no       Name of the pharmacy and phone number for the current request:  [facility]     Name of the medication requested: tramadol or codeine-has back pain and needs enough to get her thru until appt. On 10/17    Other request: would like call back on home number letting her know the answer    Can we leave a detailed message on this number? YES    Phone number patient can be reached at: Home number on file [phone] (home)    Best Time: asap    Call taken on 10/12/2018 at 11:27 AM by Sharmila Whyte

## 2018-10-12 NOTE — TELEPHONE ENCOUNTER
Please see message below.  Patient asking for either Codeine or Tramadol for back pain.   has a future appointment scheduled.    Next 5 appointments (look out 90 days)     Oct 17, 2018  9:40 AM CDT   [facility]    [address]  [facility] [address]   [phone]                Last fill on Tramadol was 5-23-18 #90 with no additional refills.    Spoke with patient.   Tramadol makes her sick so she was asking for Norco (not Codeine).  If not, she will be fine with taking Tramadol.   has a history of back pain.  Did receive an injection in her back for the pain so hasn't needed Tramadol in a while.    Unable to check  d/t unable to log in.    Please advise re: Tramadol prescription, thanks.

## 2018-10-17 ENCOUNTER — OFFICE VISIT (OUTPATIENT)
Dept: INTERNAL MEDICINE | Facility: CLINIC | Age: 28
End: 2018-10-17
Payer: COMMERCIAL

## 2018-10-17 VITALS
RESPIRATION RATE: 12 BRPM | BODY MASS INDEX: 22.36 KG/M2 | TEMPERATURE: 98.1 F | HEIGHT: 64 IN | WEIGHT: 131 LBS | HEART RATE: 60 BPM | SYSTOLIC BLOOD PRESSURE: 88 MMHG | DIASTOLIC BLOOD PRESSURE: 52 MMHG | OXYGEN SATURATION: 100 %

## 2018-10-17 DIAGNOSIS — M46.1 SACROILIITIS (H): Primary | ICD-10-CM

## 2018-10-17 DIAGNOSIS — F41.1 GAD (GENERALIZED ANXIETY DISORDER): ICD-10-CM

## 2018-10-17 DIAGNOSIS — F51.4 NIGHT TERRORS, ADULT: ICD-10-CM

## 2018-10-17 DIAGNOSIS — G47.19 EXCESSIVE DAYTIME SLEEPINESS: ICD-10-CM

## 2018-10-17 PROCEDURE — 99213 OFFICE O/P EST LOW 20 MIN: CPT | Performed by: NURSE PRACTITIONER

## 2018-10-17 RX ORDER — GABAPENTIN 100 MG/1
100 CAPSULE ORAL AT BEDTIME
Qty: 30 CAPSULE | Refills: 1 | Status: SHIPPED | OUTPATIENT
Start: 2018-10-17 | End: 2019-01-17

## 2018-10-17 ASSESSMENT — ANXIETY QUESTIONNAIRES
5. BEING SO RESTLESS THAT IT IS HARD TO SIT STILL: SEVERAL DAYS
2. NOT BEING ABLE TO STOP OR CONTROL WORRYING: SEVERAL DAYS
7. FEELING AFRAID AS IF SOMETHING AWFUL MIGHT HAPPEN: NEARLY EVERY DAY
GAD7 TOTAL SCORE: 9
3. WORRYING TOO MUCH ABOUT DIFFERENT THINGS: SEVERAL DAYS
4. TROUBLE RELAXING: SEVERAL DAYS
GAD7 TOTAL SCORE: 9
6. BECOMING EASILY ANNOYED OR IRRITABLE: SEVERAL DAYS
GAD7 TOTAL SCORE: 9
1. FEELING NERVOUS, ANXIOUS, OR ON EDGE: SEVERAL DAYS
7. FEELING AFRAID AS IF SOMETHING AWFUL MIGHT HAPPEN: NEARLY EVERY DAY

## 2018-10-17 ASSESSMENT — PATIENT HEALTH QUESTIONNAIRE - PHQ9
SUM OF ALL RESPONSES TO PHQ QUESTIONS 1-9: 12
SUM OF ALL RESPONSES TO PHQ QUESTIONS 1-9: 12
10. IF YOU CHECKED OFF ANY PROBLEMS, HOW DIFFICULT HAVE THESE PROBLEMS MADE IT FOR YOU TO DO YOUR WORK, TAKE CARE OF THINGS AT HOME, OR GET ALONG WITH OTHER PEOPLE: VERY DIFFICULT

## 2018-10-17 NOTE — PATIENT INSTRUCTIONS
Joint injection   They will call you to set up   Dr Chasity Moreno did previous injection   [phone]    Sleep study   [facility]- Numerous Locations - [phone] (Age 16 and up)  Call for sleep study

## 2018-10-17 NOTE — MR AVS SNAPSHOT
After Visit Summary   10/17/2018    Carolyn Jacobs    MRN: [id number]           Patient Information     Date Of Birth          1990        Visit Information        Provider Department      10/17/2018 9:40 AM Arleen Napier APRN CNP [facility]        Today's Diagnoses     Sacroiliitis (H)    -  1    Excessive daytime sleepiness        Night terrors, adult          Care Instructions    Joint injection   They will call you to set up   Dr Chasity Moreno did previous injection   [phone]    Sleep study   [facility]- Numerous Locations - [phone] (Age 16 and up)  Call for sleep study           Follow-ups after your visit        Additional Services     PAIN MANAGEMENT REFERRAL       [place] [facility] [place] [facility] -    Reason for Referral: Procedure Order Joint Injection:  SI      What is your diagnosis for the patient's pain? sacroiliitis       For any questions, contact the [facility] at [phone].     **ANY DIAGNOSTIC TESTS THAT ARE NOT IN EPIC SHOULD BE SENT TO THE PAIN CENTER**    REGARDING OPIOID MEDICATIONS:  The discussion of opioids management, appropriateness of therapy, and dosing will be discussed in patients being seen for evaluation.  The pain management clinics are not long-term prescribing clinics, with transition of prescribing of medications ultimately going back to the referring provider/PCP.  If prescribing is taken over at the pain clinic, it is in actively involved patients whom are appropriate for opioids, urine drug screening is completed, and long-term prescribing plan has been determined.  Therefore, we will not be automatically taking over prescribing at the patient's first visit.  Is this agreeable to you? agrees.     Please be aware that coverage of these services is subject to the terms and limitations of your health insurance plan.  Call member services at  your health plan with any benefit or coverage questions.      Please bring the following with you to your appointment:    (1) Any X-Rays, CTs or MRIs which have been performed.  Contact the facility where they were done to arrange for  prior to your scheduled appointment.    (2) List of current medications   (3) This referral request   (4) Any documents/labs given to you for this referral            SLEEP EVALUATION & MANAGEMENT REFERRAL - PEDIATRIC (AGE 2-17) -[facility]- Numerous Locations - [phone] (Age 16 and up)       Please be aware that coverage of these services is subject to the terms and limitations of your health insurance plan.  Call member services at your health plan with any benefit or coverage questions.      Please bring the following to your appointment:    >>   List of current medications   >>   This referral request   >>   Any documents/labs given to you for this referral                        Future tests that were ordered for you today     Open Future Orders        Priority Expected Expires Ordered    SLEEP EVALUATION & MANAGEMENT REFERRAL - PEDIATRIC (AGE 2-17) -[facility]- Numerous Locations - [phone] (Age 16 and up) Routine  1/15/2019 10/17/2018            Who to contact     If you have questions or need follow up information about today's clinic visit or your schedule please contact [facility] directly at [phone].  Normal or non-critical lab and imaging results will be communicated to you by MyChart, letter or phone within 4 business days after the clinic has received the results. If you do not hear from us within 7 days, please contact the clinic through MyChart or phone. If you have a critical or abnormal lab result, we will notify you by phone as soon as possible.  Submit refill requests through [employer] or call your pharmacy and they will forward the refill request to us. Please allow 3 business days for your  "refill to be completed.          Additional Information About Your Visit        MyChart Information     [employer] gives you secure access to your electronic health record. If you see a primary care provider, you can also send messages to your care team and make appointments. If you have questions, please call your primary care clinic.  If you do not have a primary care provider, please call [phone] and they will assist you.        Care EveryWhere ID     This is your Care EveryWhere ID. This could be used by other organizations to access your [place] medical records  [id number]        Your Vitals Were     Pulse Temperature Respirations Height Last Period Pulse Oximetry    60 98.1  F (36.7  C) (Oral) 12 5' 4\" (1.626 m) 10/10/2018 100%    BMI (Body Mass Index)                   22.49 kg/m2            Blood Pressure from Last 3 Encounters:   10/17/18 (!) 88/52   08/02/18 98/62   07/26/18 110/62    Weight from Last 3 Encounters:   10/17/18 131 lb (59.4 kg)   08/02/18 122 lb (55.3 kg)   07/26/18 123 lb 12.8 oz (56.2 kg)              We Performed the Following     PAIN MANAGEMENT REFERRAL        Primary Care Provider Office Phone # Fax #    JOSE Bonilla [facility] [phone]       [address] [facility] [address]        Equal Access to Services     HANSEL VERDUGO : Hadii aad ku hadasho Soomaali, waaxda luqadaha, qaybta kaalmada adeegyada, russel chester . So [facility] [phone].    ATENCIÓN: Si habla español, tiene a redding disposición servicios gratuitos de asistencia lingüística. Llame al [phone].    We comply with applicable federal civil rights laws and [place] laws. We do not discriminate on the basis of race, color, national origin, age, disability, sex, sexual orientation, or gender identity.            Thank you!     Thank you for choosing [facility]  for your care. Our goal is always to provide you with excellent care. Hearing back from " our patients is one way we can continue to improve our services. Please take a few minutes to complete the written survey that you may receive in the mail after your visit with us. Thank you!             Your Updated Medication List - Protect others around you: Learn how to safely use, store and throw away your medicines at www.disposemymeds.org.          This list is accurate as of 10/17/18 10:22 AM.  Always use your most recent med list.                   Brand Name Dispense Instructions for use Diagnosis    clonazePAM 1 MG tablet    klonoPIN    30 tablet    Take 1 tablet (1 mg) by mouth nightly as needed for anxiety Increased dose.    REILLY (generalized anxiety disorder)       HYDROcodone-acetaminophen 5-325 MG per tablet    NORCO    60 tablet    Take 1 tablet by mouth every 12 hours as needed for pain    Low back pain, non-specific       levonorgestrel 20 MCG/24HR IUD    MIRENA    1 each    1 each (20 mcg) by Intrauterine route once for 1 dose    Encounter for IUD insertion       SUMAtriptan 25 MG tablet    IMITREX    9 tablet    Take 1-2 tablets (25-50 mg) by mouth at onset of headache for migraine May repeat in 2 hours. Max 8 tablets/24 hours.    Nonintractable headache, unspecified chronicity pattern, unspecified headache type
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
## 2018-10-18 ASSESSMENT — ANXIETY QUESTIONNAIRES: GAD7 TOTAL SCORE: 9

## 2018-10-18 ASSESSMENT — PATIENT HEALTH QUESTIONNAIRE - PHQ9: SUM OF ALL RESPONSES TO PHQ QUESTIONS 1-9: 12

## 2018-11-08 ENCOUNTER — TELEPHONE (OUTPATIENT)
Dept: INTERNAL MEDICINE | Facility: CLINIC | Age: 28
End: 2018-11-08

## 2018-11-08 NOTE — TELEPHONE ENCOUNTER
"Patient has been taking Clonazepam but does not want to take it any longer, wants it removed from her med list.  Takes 1 tab daily but does not feel that it has been helpful.  Her significant other does not like her being on this medication and feels she is \"different\" when taking med.  ROSALES Lopez R.N.    She also apologizes for missing appt stating she had a big family emergency.    "

## 2019-01-14 ENCOUNTER — TELEPHONE (OUTPATIENT)
Dept: INTERNAL MEDICINE | Facility: CLINIC | Age: 29
End: 2019-01-14

## 2019-01-14 NOTE — TELEPHONE ENCOUNTER
Reason for call:  Patient reporting a symptom    Symptom or request: yeast infec or bacterial vaginosis    Duration (how long have symptoms been present): 3 wks    Have you been treated for this before? Yes    Additional comments: Pt is wondering if she can get a rx for this.    Phone Number patient can be reached at:  Home number on file [phone] (home)    Best Time:  any    Can we leave a detailed message on this number:  YES    Call taken on 1/14/2019 at 11:41 AM by Jacklyn Cunha

## 2019-01-15 NOTE — TELEPHONE ENCOUNTER
Patient states she has had increased white vaginal discharge and over the past couple of days it has taken on a greenish hue.  Denies burning or itching. States she has a history of both yeast and bacterial vaginal infections.   Advised pt she needs an appointment to determine cause.  Patient scheduled.  ROSALES Lopez R.N.

## 2019-01-17 ENCOUNTER — OFFICE VISIT (OUTPATIENT)
Dept: INTERNAL MEDICINE | Facility: CLINIC | Age: 29
End: 2019-01-17
Payer: COMMERCIAL

## 2019-01-17 VITALS
SYSTOLIC BLOOD PRESSURE: 108 MMHG | HEIGHT: 64 IN | TEMPERATURE: 98.2 F | BODY MASS INDEX: 22.71 KG/M2 | OXYGEN SATURATION: 98 % | RESPIRATION RATE: 16 BRPM | DIASTOLIC BLOOD PRESSURE: 64 MMHG | WEIGHT: 133 LBS | HEART RATE: 90 BPM

## 2019-01-17 DIAGNOSIS — Z11.3 SCREEN FOR STD (SEXUALLY TRANSMITTED DISEASE): ICD-10-CM

## 2019-01-17 DIAGNOSIS — Z71.6 ENCOUNTER FOR SMOKING CESSATION COUNSELING: ICD-10-CM

## 2019-01-17 DIAGNOSIS — F41.1 GAD (GENERALIZED ANXIETY DISORDER): ICD-10-CM

## 2019-01-17 DIAGNOSIS — M46.1 SACROILIITIS (H): ICD-10-CM

## 2019-01-17 DIAGNOSIS — N89.8 VAGINAL DISCHARGE: Primary | ICD-10-CM

## 2019-01-17 LAB
SPECIMEN SOURCE: NORMAL
WET PREP SPEC: NORMAL

## 2019-01-17 PROCEDURE — 87591 N.GONORRHOEAE DNA AMP PROB: CPT | Performed by: NURSE PRACTITIONER

## 2019-01-17 PROCEDURE — 87491 CHLMYD TRACH DNA AMP PROBE: CPT | Performed by: NURSE PRACTITIONER

## 2019-01-17 PROCEDURE — 87210 SMEAR WET MOUNT SALINE/INK: CPT | Performed by: NURSE PRACTITIONER

## 2019-01-17 PROCEDURE — 99214 OFFICE O/P EST MOD 30 MIN: CPT | Performed by: NURSE PRACTITIONER

## 2019-01-17 RX ORDER — VARENICLINE TARTRATE 1 MG/1
1 TABLET, FILM COATED ORAL 2 TIMES DAILY
Qty: 180 TABLET | Refills: 3 | Status: SHIPPED | OUTPATIENT
Start: 2019-02-14 | End: 2020-02-14

## 2019-01-17 RX ORDER — FLUCONAZOLE 150 MG/1
150 TABLET ORAL DAILY
Qty: 4 TABLET | Refills: 0 | Status: SHIPPED | OUTPATIENT
Start: 2019-01-17 | End: 2019-01-21

## 2019-01-17 RX ORDER — GABAPENTIN 300 MG/1
300 CAPSULE ORAL AT BEDTIME
Qty: 90 CAPSULE | Refills: 3 | Status: SHIPPED | OUTPATIENT
Start: 2019-01-17 | End: 2024-05-16

## 2019-01-17 ASSESSMENT — MIFFLIN-ST. JEOR: SCORE: 1313.28

## 2019-01-17 NOTE — NURSING NOTE
"Chief Complaint   Patient presents with     Vaginal Problem     discharge is green this past Monday and last week. pt has a history of vaginitis.     initial /64   Pulse 90   Temp 98.2  F (36.8  C) (Oral)   Resp 16   Ht 1.626 m (5' 4\")   Wt 60.3 kg (133 lb)   SpO2 98%   BMI 22.83 kg/m   Estimated body mass index is 22.83 kg/m  as calculated from the following:    Height as of this encounter: 1.626 m (5' 4\").    Weight as of this encounter: 60.3 kg (133 lb)..  bp completed using cuff size regular  ORA GURROLA LPN  "

## 2019-01-17 NOTE — PROGRESS NOTES
.  SUBJECTIVE:   Carolyn Jacobs is a 28 year old female who presents to clinic today for the following health issues:  Here with vaginal discharge   She has noticed yellow and green discharge a few times - feels it is yeast, possibly bacterial   She felt diflucan used last time helped but did not help for long - if positive for yeast will do a longer course  She has not been sexually active recently   Willing to do STD testing     Wants to quit smoking   Wants to try chantix   Discussed anxiety and dream issues associated with it     Gabapentin seems to be helping with sleep and dreams, anxiety - night terrors- wants to try increase dose- will try 300 mg at hs             Problem list and histories reviewed & adjusted, as indicated.  Additional history: as documented    Patient Active Problem List   Diagnosis     CARDIOVASCULAR SCREENING; LDL GOAL LESS THAN 160     Acetabular labrum tear     Migraine     REILLY (generalized anxiety disorder)     Severe episode of recurrent major depressive disorder, without psychotic features (H)     Attention disturbance     Exercise-induced asthma     Current smoker     Controlled substance agreement signed     Mirena IUD insertion     Past Surgical History:   Procedure Laterality Date     C INDUCED ABORTN BY DIL/EVAC  2005     wisdom teeth         Social History     Tobacco Use     Smoking status: Current Some Day Smoker     Packs/day: 0.25     Years: 1.00     Pack years: 0.25     Types: Cigarettes     Last attempt to quit: 10/19/2014     Years since quittin.2     Smokeless tobacco: Former User     Tobacco comment: 5 cigs per day   Substance Use Topics     Alcohol use: No     Family History   Problem Relation Age of Onset     Hypertension Mother      Neurologic Disorder Mother         migraine headache     Depression Sister      Depression Brother      Cerebrovascular Disease Maternal Grandfather      No Known Problems Son      No Known Problems Daughter            Reviewed  "and updated as needed this visit by clinical staff  Tobacco  Allergies  Meds  Med Hx  Surg Hx  Fam Hx  Soc Hx      Reviewed and updated as needed this visit by Provider         ROS:  Constitutional, HEENT, cardiovascular, pulmonary, gi and gu systems are negative, except as otherwise noted.    OBJECTIVE:     /64   Pulse 90   Temp 98.2  F (36.8  C) (Oral)   Resp 16   Ht 1.626 m (5' 4\")   Wt 60.3 kg (133 lb)   SpO2 98%   BMI 22.83 kg/m    Body mass index is 22.83 kg/m .  GENERAL:  alert and no distress- here with daughter who is a bit out of control- throwing things and hitting her and screaming - daughter recently diagnosed with autism- her son- not at visit already diagnosed as autistic   RESP: lungs clear  CV: regular rate and rhythm   (female): normal female external genitalia, normal urethral meatus, vaginal mucosa,  with yellow chunky discharge  PSYCH: mentation appears normal, affect normal/bright    Diagnostic Test Results:  Wet prep   STD     ASSESSMENT/PLAN:             1. Vaginal discharge  Wet prep negative  But discharge was classic for yeast   If patient desires, I am going to treat her and see if helps symptoms   - Wet prep  - NEISSERIA GONORRHOEA PCR  - CHLAMYDIA TRACHOMATIS PCR    2. Screen for STD (sexually transmitted disease)    - NEISSERIA GONORRHOEA PCR  - CHLAMYDIA TRACHOMATIS PCR    3. Encounter for smoking cessation counseling    - varenicline (CHANTIX CHEPE) 0.5 MG X 11 & 1 MG X 42 tablet; Take 0.5 mg tab daily for 3 days, THEN 0.5 mg tab twice daily for 4 days, THEN 1 mg twice daily.  Dispense: 53 tablet; Refill: 0  - varenicline (CHANTIX) 1 MG tablet; Take 1 tablet (1 mg) by mouth 2 times daily  Dispense: 180 tablet; Refill: 3    4. Sacroiliitis (H)  gabapentin    5. REILLY (generalized anxiety disorder)  Increase dose requested   - gabapentin (NEURONTIN) 300 MG capsule; Take 1 capsule (300 mg) by mouth At Bedtime For sleep and anxiety  Dispense: 90 capsule; Refill: " 3    Patient Instructions   Increase gabapentin to 300 mg at bedtime     We will call you with wet prep       JOSE Bonilla [facility]

## 2019-01-18 LAB
C TRACH DNA SPEC QL NAA+PROBE: NEGATIVE
N GONORRHOEA DNA SPEC QL NAA+PROBE: NEGATIVE
SPECIMEN SOURCE: NORMAL
SPECIMEN SOURCE: NORMAL

## 2019-07-24 ENCOUNTER — TELEPHONE (OUTPATIENT)
Dept: INTERNAL MEDICINE | Facility: CLINIC | Age: 29
End: 2019-07-24

## 2019-07-24 NOTE — LETTER
[facility]  [address], [address]  [place], [place]  [address]                                            TEL:[phone]  FAX:[phone]      Carolyn Jacobs  [address] [place] [address]      July 24, 2019    Dear Carolyn,    At [facility], we care about your health and well-being. A review of your chart has indicated that you are due for a asthma check and physical. Please contact us at [phone] to schedule an appointment.     If you have already had one or all of the above screening tests at another facility, please call us to update your chart.        Sincerely,      HERMILA Osborn

## 2019-07-24 NOTE — TELEPHONE ENCOUNTER
Panel Management Review      Patient has the following on her problem list:     Depression / Dysthymia review    Measure:  Needs PHQ-9 score of 4 or less during index window.  Administer PHQ-9 and if score is 5 or more, send encounter to provider for next steps.    5 - 7 month window range:     PHQ-9 SCORE 1/24/2018 5/23/2018 10/17/2018   PHQ-9 Total Score - - -   PHQ-9 Total Score MyChart 10 (Moderate depression) 10 (Moderate depression) 12 (Moderate depression)   PHQ-9 Total Score 10 10 12       If PHQ-9 recheck is 5 or more, route to provider for next steps.    Patient is due for:  None    Asthma review   No flowsheet data found.   1. Is Asthma diagnosis on the Problem List? Yes    2. Is Asthma listed on Health Maintenance? Yes    3. Patient is due for:  ACT and AAP      Composite cancer screening  Chart review shows that this patient is due/due soon for the following None  Summary:    Patient is due/failing the following:   AAP, ACT and PHYSICAL    Action needed:   Patient needs office visit for see above.    Type of outreach:    Sent letter.    Questions for provider review:    None                                                                                                                                    .ORA GURROLA LPN       Chart routed to none .

## 2019-07-24 NOTE — LETTER
[facility]  [address], [address]  [place], [place]  [address]                                            TEL:[phone]  FAX:[phone]      Carolyn Jacobs  [address] [place] [address]      September 26, 2019    Dear Carolyn,    .At [facility], we care about your health and well-being. A review of your chart has indicated that you are due for a physical and asthma check. Please contact us at [phone] to schedule an appointment.     If you have already had one or all of the above screening tests at another facility, please call us to update your chart.          Sincerely,      HERMILA Osborn

## 2019-11-02 ENCOUNTER — HEALTH MAINTENANCE LETTER (OUTPATIENT)
Age: 29
End: 2019-11-02

## 2019-11-14 ENCOUNTER — TELEPHONE (OUTPATIENT)
Dept: INTERNAL MEDICINE | Facility: CLINIC | Age: 29
End: 2019-11-14

## 2019-11-14 NOTE — LETTER
[facility]  [address], [address]  [place], [place]  [address]                                            TEL:[phone]  FAX:[phone]      Carolyn Jacobs  [address] [place], [place] [address]      November 14, 2019    Dear Carolyn,    At [facility], we care about your health and well-being. A review of your chart has indicated that you are due for a physical and asthma check. Please contact us at [phone] to schedule an appointment.     If you have already had one or all of the above screening tests at another facility, please call us to update your chart.        Sincerely,      HERMILA Osborn

## 2019-11-14 NOTE — TELEPHONE ENCOUNTER
Panel Management Review      Patient has the following on her problem list:   Asthma review   No flowsheet data found.   1. Is Asthma diagnosis on the Problem List? Yes    2. Is Asthma listed on Health Maintenance? Yes    3. Patient is due for:  ACT and AAP      Composite cancer screening  Chart review shows that this patient is due/due soon for the following None  Summary:    Patient is due/failing the following:   AAP, ACT and PHYSICAL    Action needed:   Patient needs office visit for see above.    Type of outreach:    Sent letter.    Questions for provider review:    None                                                                                                                                    .ORA GURROLA LPN       Chart routed to none .

## 2019-11-14 NOTE — LETTER
[facility]  [address], [address]  [place], [place]  [address]                                            TEL:[phone]  FAX:[phone]      Carolyn Jacobs  [address] [place], [place] [address]      March 3, 2020    Dear Carolyn,    At [facility], we care about your health and well-being. A review of your chart has indicated that you are due for an asthma check and physical. Please contact us at [phone] to schedule an appointment.     If you have already had one or all of the above screening tests at another facility, please call us to update your chart.        Sincerely,      HERMILA Osborn

## 2020-01-08 ENCOUNTER — TRANSFERRED RECORDS (OUTPATIENT)
Dept: HEALTH INFORMATION MANAGEMENT | Facility: CLINIC | Age: 30
End: 2020-01-08

## 2020-02-10 ENCOUNTER — HEALTH MAINTENANCE LETTER (OUTPATIENT)
Age: 30
End: 2020-02-10

## 2020-06-24 ENCOUNTER — TRANSFERRED RECORDS (OUTPATIENT)
Dept: HEALTH INFORMATION MANAGEMENT | Facility: CLINIC | Age: 30
End: 2020-06-24

## 2020-09-17 ENCOUNTER — PATIENT OUTREACH (OUTPATIENT)
Dept: CARE COORDINATION | Facility: CLINIC | Age: 30
End: 2020-09-17

## 2020-09-17 DIAGNOSIS — J45.990 EXERCISE-INDUCED ASTHMA: Primary | ICD-10-CM

## 2020-09-17 DIAGNOSIS — F17.200 CURRENT SMOKER: ICD-10-CM

## 2020-09-17 DIAGNOSIS — F41.1 GAD (GENERALIZED ANXIETY DISORDER): ICD-10-CM

## 2020-09-17 NOTE — PROGRESS NOTES
Clinic Care Coordination Contact  [facility]/Voicemail       Clinical Data: Care Coordinator Outreach  Outreach attempted x 1.  Left message on patient's voicemail with call back information and requested return call.  Plan: Care Coordinator will try to reach patient again in 1-2 business days.

## 2020-09-18 NOTE — PROGRESS NOTES
Clinic Care Coordination Contact  Community Health Worker Initial Outreach    Patient accepts CC: No, patient now has a PCP with [facility] and is going to [facility] for her care. Patient will be sent Care Coordination introduction letter for future reference.

## 2020-11-14 ENCOUNTER — HEALTH MAINTENANCE LETTER (OUTPATIENT)
Age: 30
End: 2020-11-14

## 2021-03-04 NOTE — PROGRESS NOTES
SUBJECTIVE:   Carolyn Jacobs is a 28 year old female who presents to clinic today for the following health issues:      Answers for HPI/ROS submitted by the patient on 5/23/2018   If you checked off any problems, how difficult have these problems made it for you to do your work, take care of things at home, or get along with other people?: Very difficult  PHQ9 TOTAL SCORE: 10    Review meds,pt c/o ongoing low back pain since Feb 2018, pt fell while at the gym working out.   Saw Dr Cortes at time   Back strain   X ray at the time   Pt having to embrace self with cough, having difficulty picking up child, pt cannot have sex due to pain.   Sometimes with back pain has urine leakage       With hip to expect displaced pain - pretty consistent    Runs 5 miles and swims daily   Exercise has not helped      No leg pain or pain with straight leg raise     Ward white discharge - non smell   Yeast in past and this is similar     Problem list and histories reviewed & adjusted, as indicated.  Additional history: as documented    Patient Active Problem List   Diagnosis     CARDIOVASCULAR SCREENING; LDL GOAL LESS THAN 160     Acetabular labrum tear     Migraine     REILLY (generalized anxiety disorder)     Severe episode of recurrent major depressive disorder, without psychotic features (H)     Attention disturbance     Exercise-induced asthma     Current smoker     Borderline personality disorder     Controlled substance agreement signed     Mirena IUD insertion     Past Surgical History:   Procedure Laterality Date     C INDUCED ABORTN BY DIL/EVAC  2005     wisdom teeth         Social History   Substance Use Topics     Smoking status: Current Some Day Smoker     Packs/day: 0.25     Years: 1.00     Types: Cigarettes     Last attempt to quit: 10/19/2014     Smokeless tobacco: Former User      Comment: 5 cigs per day     Alcohol use No     Family History   Problem Relation Age of Onset     Hypertension Mother      Neurologic  "Disorder Mother      migraine headache     Depression Sister      Depression Brother      CEREBROVASCULAR DISEASE Maternal Grandfather            Reviewed and updated as needed this visit by clinical staff  Tobacco  Allergies  Meds  Med Hx  Surg Hx  Fam Hx  Soc Hx      Reviewed and updated as needed this visit by Provider  Allergies         ROS:  Constitutional, HEENT, cardiovascular, pulmonary, gi and gu systems are negative, except as otherwise noted.    OBJECTIVE:     /60 (BP Location: Right arm, Patient Position: Chair, Cuff Size: Adult Regular)  Pulse 80  Temp 97.4  F (36.3  C) (Oral)  Resp 18  Ht 5' 4\" (1.626 m)  Wt 123 lb (55.8 kg)  LMP 05/21/2018  SpO2 100%  BMI 21.11 kg/m2  Body mass index is 21.11 kg/(m^2).  GENERAL:  alert and no distress  RESP: lungs clear to auscultation - no rales, rhonchi or wheezes  CV: regular rate and rhythm  ABDOMEN: soft, nontender,  and bowel sounds normal  MS: low back pain sacral iliac pain   NEURO: Normal strength and tone, mentation intact and speech normal  PSYCH: mentation appears normal, affect normal/bright    Diagnostic Test Results:  MRI     ASSESSMENT/PLAN:             1. Activity, other caregiving  Autistic son and he is unable to be left for a minute  Opens door to let himself and sister out - then they walk toward street  Any locks he can open   She feels she may need help at home, but hard to admit it   Willing to talk to care coordinator    - CARE COORDINATION REFERRAL    2. Bilateral low back pain without sciatica, unspecified chronicity  Needs evaluation and treatment   - MR Lumbar Spine w/o Contrast; Future  - NEUROSURGERY REFERRAL  - ibuprofen (ADVIL/MOTRIN) 800 MG tablet; Take 1 tablet (800 mg) by mouth every 8 hours as needed for moderate pain  Dispense: 90 tablet; Refill: 1    3. REILLY (generalized anxiety disorder)  Stable     4. Hip pain, right  Chronic   - traMADol (ULTRAM) 50 MG tablet; Take 1 tablet (50 mg) by mouth every 8 hours as " needed for pain  Dispense: 90 tablet; Refill: 0  - ibuprofen (ADVIL/MOTRIN) 800 MG tablet; Take 1 tablet (800 mg) by mouth every 8 hours as needed for moderate pain  Dispense: 90 tablet; Refill: 1    5. Acetabular labrum tear, right, subsequent encounter    - traMADol (ULTRAM) 50 MG tablet; Take 1 tablet (50 mg) by mouth every 8 hours as needed for pain  Dispense: 90 tablet; Refill: 0    6. Yeast infection of the vagina    - fluconazole (DIFLUCAN) 150 MG tablet; Take 1 tablet (150 mg) by mouth once for 1 dose  Dispense: 1 tablet; Refill: 0    Patient Instructions   MRI lumbar spine   [phone]    [facility] [phone]   [email or web address]    Refill on medication         JOSE Bonilla [facility]     Polyhydramnios

## 2021-06-02 ENCOUNTER — APPOINTMENT (RX ONLY)
Dept: URBAN - METROPOLITAN AREA CLINIC 374 | Facility: CLINIC | Age: 31
Setting detail: DERMATOLOGY
End: 2021-06-02

## 2021-06-02 DIAGNOSIS — L27.2 DERMATITIS DUE TO INGESTED FOOD: ICD-10-CM

## 2021-06-02 DIAGNOSIS — L50.1 IDIOPATHIC URTICARIA: ICD-10-CM

## 2021-06-02 DIAGNOSIS — L65.0 TELOGEN EFFLUVIUM: ICD-10-CM

## 2021-06-02 PROBLEM — L65.9 NONSCARRING HAIR LOSS, UNSPECIFIED: Status: ACTIVE | Noted: 2021-06-02

## 2021-06-02 PROCEDURE — 99204 OFFICE O/P NEW MOD 45 MIN: CPT

## 2021-06-02 PROCEDURE — ? ORDER TESTS

## 2021-06-02 PROCEDURE — ? PRESCRIPTION

## 2021-06-02 PROCEDURE — ? PHOTO-DOCUMENTATION

## 2021-06-02 PROCEDURE — ? COUNSELING

## 2021-06-02 PROCEDURE — ? PRESCRIPTION MEDICATION MANAGEMENT

## 2021-06-02 PROCEDURE — ? DEFER

## 2021-06-02 RX ORDER — TRIAMCINOLONE ACETONIDE 0.25 MG/ML
1 LOTION TOPICAL QHS
Qty: 1 | Refills: 2 | Status: ERX | COMMUNITY
Start: 2021-06-02

## 2021-06-02 RX ADMIN — TRIAMCINOLONE ACETONIDE 1: 0.25 LOTION TOPICAL at 00:00

## 2021-06-02 ASSESSMENT — LOCATION ZONE DERM
LOCATION ZONE: TRUNK
LOCATION ZONE: SCALP
LOCATION ZONE: LEG

## 2021-06-02 ASSESSMENT — LOCATION SIMPLE DESCRIPTION DERM
LOCATION SIMPLE: POSTERIOR SCALP
LOCATION SIMPLE: LOWER BACK
LOCATION SIMPLE: RIGHT THIGH

## 2021-06-02 ASSESSMENT — LOCATION DETAILED DESCRIPTION DERM
LOCATION DETAILED: RIGHT ANTERIOR DISTAL THIGH
LOCATION DETAILED: SUPERIOR LUMBAR SPINE
LOCATION DETAILED: POSTERIOR MID-PARIETAL SCALP

## 2021-06-02 NOTE — PROCEDURE: ORDER TESTS
Bill For Surgical Tray: no
Performing Laboratory: -178
Billing Type: Third-Party Bill
Expected Date Of Service: 06/02/2021

## 2021-06-02 NOTE — PROCEDURE: DEFER
Reason To Defer Override: Allergist
Instructions (Optional): Will get labs done and if needed will refer to GI and/or Allergist
Detail Level: Zone
Procedure To Be Performed At Next Visit: Treatment
Introduction Text (Please End With A Colon): The following procedure was deferred:
Detail Level: Generalized
Reason To Defer Override: treatment with Zyrtec depending on results of labs

## 2021-06-02 NOTE — PROCEDURE: PRESCRIPTION MEDICATION MANAGEMENT
Plan: Offered doxycycline for treatment but patient is currently trying to conceive
Render In Strict Bullet Format?: No
Detail Level: Zone
Initiate Treatment: Triamcinolone 0.025% lotion qhs to scalp

## 2021-06-02 NOTE — HPI: HAIR LOSS
Previous Labs: No
How Did The Hair Loss Occur?: gradual in onset
How Severe Is Your Hair Loss?: moderate
What Hair Products Do You Use?: Tea tree oil and black castor oil qoday

## 2021-06-02 NOTE — HPI: HIVES (URTICARIA)
How Severe Are Your Hives?: moderate
Please Select The Phrase That Best Describes Your Hives.: individual welts do not stay in the same place for more than 24 hours
Is This A New Presentation, Or A Follow-Up?: Hives
Additional History: Patient states she feels she developed over this same time period that she has a food sensitivity. She is not sure if this is related.

## 2021-06-08 ENCOUNTER — RX ONLY (OUTPATIENT)
Age: 31
Setting detail: RX ONLY
End: 2021-06-08

## 2021-06-08 RX ORDER — FEXOFENADINE 180 MG/1
1 TABLET, FILM COATED ORAL QAM
Qty: 30 | Refills: 3 | Status: ERX | COMMUNITY
Start: 2021-06-08

## 2021-07-06 RX ORDER — TRIAMCINOLONE ACETONIDE 0.25 MG/ML
LOTION TOPICAL QHS
Qty: 1 | Refills: 2 | Status: ERX

## 2021-07-14 ENCOUNTER — APPOINTMENT (RX ONLY)
Dept: URBAN - METROPOLITAN AREA CLINIC 374 | Facility: CLINIC | Age: 31
Setting detail: DERMATOLOGY
End: 2021-07-14

## 2021-07-14 DIAGNOSIS — L65.0 TELOGEN EFFLUVIUM: ICD-10-CM | Status: IMPROVED

## 2021-07-14 DIAGNOSIS — L27.2 DERMATITIS DUE TO INGESTED FOOD: ICD-10-CM | Status: STABLE

## 2021-07-14 PROBLEM — L65.9 NONSCARRING HAIR LOSS, UNSPECIFIED: Status: ACTIVE | Noted: 2021-07-14

## 2021-07-14 PROCEDURE — ? RECOMMENDATIONS

## 2021-07-14 PROCEDURE — ? TREATMENT REGIMEN

## 2021-07-14 PROCEDURE — ? IN-HOUSE DISPENSING PHARMACY

## 2021-07-14 PROCEDURE — ? COUNSELING

## 2021-07-14 PROCEDURE — ? PHOTO-DOCUMENTATION

## 2021-07-14 PROCEDURE — 99214 OFFICE O/P EST MOD 30 MIN: CPT

## 2021-07-14 PROCEDURE — ? LAB REPORTS REVIEWED

## 2021-07-14 PROCEDURE — ? PRESCRIPTION MEDICATION MANAGEMENT

## 2021-07-14 ASSESSMENT — LOCATION DETAILED DESCRIPTION DERM
LOCATION DETAILED: POSTERIOR MID-PARIETAL SCALP
LOCATION DETAILED: RIGHT ANTERIOR DISTAL THIGH
LOCATION DETAILED: RIGHT MEDIAL UPPER BACK
LOCATION DETAILED: RIGHT PROXIMAL POSTERIOR UPPER ARM
LOCATION DETAILED: PERIUMBILICAL SKIN
LOCATION DETAILED: LEFT ANTERIOR DISTAL THIGH
LOCATION DETAILED: LEFT PROXIMAL POSTERIOR UPPER ARM

## 2021-07-14 ASSESSMENT — LOCATION SIMPLE DESCRIPTION DERM
LOCATION SIMPLE: ABDOMEN
LOCATION SIMPLE: RIGHT POSTERIOR UPPER ARM
LOCATION SIMPLE: RIGHT UPPER BACK
LOCATION SIMPLE: RIGHT THIGH
LOCATION SIMPLE: LEFT THIGH
LOCATION SIMPLE: POSTERIOR SCALP
LOCATION SIMPLE: LEFT POSTERIOR UPPER ARM

## 2021-07-14 ASSESSMENT — LOCATION ZONE DERM
LOCATION ZONE: TRUNK
LOCATION ZONE: LEG
LOCATION ZONE: SCALP
LOCATION ZONE: ARM

## 2021-07-14 NOTE — PROCEDURE: IN-HOUSE DISPENSING PHARMACY
Three attempts were made with no response from the patient to schedule with OB. Referral letter mailed and order deferred for one more week.  
Product 32 Amount/Unit (Numbers Only): 0
Product 59 Unit Type: mg
Product 16 Price/Unit (In Dollars): 45
Name Of Product 3: Jim tretinoin 0.05% topical cream
Product 4 Unit Type: bottle(s)
Name Of Product 14: Hyaluronic acid eye serum
Product 11 Amount/Unit (Numbers Only): 1
Product 7 Price/Unit (In Dollars): 36.75
Product 16 Refills: 3
Product 9 Application Directions: spray thin layer on back qday
Name Of Product 5: R Essentials KP Kit
Product 1 Price/Unit (In Dollars): 60
Product 3 Application Directions: apply thin layer to face qhs
Product 14 Application Directions: apply thin layer to under eye area qday
Name Of Product 10: Proscriptix shampoo, conditioner and pills
Product 3 Unit Type: tube(s)
Product 5 Application Directions: wash face and neck qday with cleanser then apply thin layer of lotion
Product 12 Refills: 2
Name Of Product 4: Ultra Gentle Chemical Free SPF 30
Product 14 Unit Type: jar(s)
Product 5 Unit Type: kit(s)
Name Of Product 2: proscriptix pills
Product 15 Price/Unit (In Dollars): 39
Name Of Product 13: DensiFi Shampoo and Conditioner
Product 6 Price/Unit (In Dollars): 30
Product 8 Application Directions: apply thin layer to scar BID
Product 13 Application Directions: Use to wash hair BID
Product 2 Application Directions: take 1 tab po TID
Product 11 Price/Unit (In Dollars): 105
Name Of Product 9: 10/2 Therapeutic Spray
Product 4 Application Directions: apply thin layer to face qam and every 2 hours during the day
Name Of Product 7: R Essentials sunscreen SPF50
Render Refills If Set To 0: Yes
Product 3 Price/Unit (In Dollars): 70
Name Of Product 1: Jim tretinoin 0.025% topical cream
Product 16 Application Directions: Apply qhs to the scalp
Product 14 [place]/Unit (In Dollars): 51
Name Of Product 12: 10/2 Therapeutic pads
Product 5 Price/Unit (In Dollars): 55
Product 7 Application Directions: apply thin layer to face qam
Product 12 Application Directions: wipe face qam
Product 10 Price/Unit (In Dollars): 85
Name Of Product 8: Biocorneum scar gel
Name Of Product 15: AHA 10% cleanser
Product 10 Application Directions: Wash hair with shampoo and conditioner each wash; take 1 pill po TID
Product 8 Price/Unit (In Dollars): 29.95
Name Of Product 6: 5/2 Therapeutic pads
Product 2 Price/Unit (In Dollars): 46
Detail Level: Zone
Product 15 Application Directions: Wash aa of body qday in shower
Product 13 Price/Unit (In Dollars): 44.80
Name Of Product 11: retinol eye cream
Product 6 Application Directions: wipe face qam after washing
Name Of Product 16: Proscriptx rx hair serum
Product 11 Application Directions: apply thin layer to periorbital region qhs

## 2021-07-14 NOTE — PROCEDURE: LAB REPORTS REVIEWED
Detail Level: Generalized
Summarized Lab Results: UGO (6/3/2021)
Summarized Lab Results: positive allergens identified (shrimp, cat, dog etc)

## 2021-07-14 NOTE — PROCEDURE: PRESCRIPTION MEDICATION MANAGEMENT
Render In Strict Bullet Format?: No
Detail Level: Zone
Initiate Treatment: Proscriptx rx hair serum qhs to the scalp
Continue Regimen: Triamcinolone 0.025% lotion qhs to scalp

## 2021-07-14 NOTE — PROCEDURE: RECOMMENDATIONS
Render Risk Assessment In Note?: no
Recommendations (Free Text): Patient will consult with allergist for further evaluation
Detail Level: Zone
Recommendation Preamble: The following recommendations were made during the visit:

## 2021-08-23 NOTE — NURSING NOTE
"Chief Complaint   Patient presents with     Hip Pain     Right hip       Initial /68 (BP Location: Right arm, Patient Position: Sitting, Cuff Size: Adult Regular)  Ht 5' 4\" (1.626 m)  Wt 148 lb (67.1 kg)  LMP 07/13/2017  BMI 25.4 kg/m2 Estimated body mass index is 25.4 kg/(m^2) as calculated from the following:    Height as of this encounter: 5' 4\" (1.626 m).    Weight as of this encounter: 148 lb (67.1 kg).  Medication Reconciliation: complete    "
EGD
RIJ central line placement

## 2021-09-12 ENCOUNTER — HEALTH MAINTENANCE LETTER (OUTPATIENT)
Age: 31
End: 2021-09-12

## 2022-01-02 ENCOUNTER — HEALTH MAINTENANCE LETTER (OUTPATIENT)
Age: 32
End: 2022-01-02

## 2022-01-14 LAB
HBV SURFACE AG SER QL: NONREACTIVE
RUBELLA IGG SCREEN: NORMAL

## 2022-08-05 LAB — GP B STREP SPEC QL CULT: NORMAL

## 2022-08-26 ENCOUNTER — APPOINTMENT (OUTPATIENT)
Dept: OBSTETRICS AND GYNECOLOGY | Facility: HOSPITAL | Age: 32
End: 2022-08-26
Payer: COMMERCIAL

## 2022-08-26 ENCOUNTER — HOSPITAL ENCOUNTER (INPATIENT)
Facility: HOSPITAL | Age: 32
LOS: 2 days | Discharge: HOME | End: 2022-08-28
Attending: OBSTETRICS & GYNECOLOGY | Admitting: OBSTETRICS & GYNECOLOGY
Payer: COMMERCIAL

## 2022-08-26 ENCOUNTER — ANESTHESIA (INPATIENT)
Dept: OBSTETRICS AND GYNECOLOGY | Facility: HOSPITAL | Age: 32
End: 2022-08-26
Payer: COMMERCIAL

## 2022-08-26 ENCOUNTER — ANESTHESIA EVENT (INPATIENT)
Dept: OBSTETRICS AND GYNECOLOGY | Facility: HOSPITAL | Age: 32
End: 2022-08-26
Payer: COMMERCIAL

## 2022-08-26 LAB
ABO + RH BLD: NORMAL
ABO + RH BLD: NORMAL
AMPHET UR QL SCN: NOT DETECTED
BARBITURATES UR QL SCN: NOT DETECTED
BENZODIAZ UR QL SCN: NOT DETECTED
BLD GP AB SCN SERPL QL: NEGATIVE
BUPRENORPHINE UR QL: NOT DETECTED
CANNABINOIDS UR QL SCN: NOT DETECTED
COCAINE UR QL SCN: NOT DETECTED
D AG BLD QL: POSITIVE
D AG BLD QL: POSITIVE
ERYTHROCYTE [DISTWIDTH] IN BLOOD BY AUTOMATED COUNT: 14.6 % (ref 11.7–14.4)
ETHANOL UR-MCNC: NEGATIVE MG/DL
HBV SURFACE AG SER QL: NONREACTIVE
HCT VFR BLDCO AUTO: 38.7 % (ref 35–45)
HGB BLD-MCNC: 13.1 G/DL (ref 11.8–15.7)
LABORATORY COMMENT REPORT: NORMAL
LABORATORY COMMENT REPORT: NORMAL
MCH RBC QN AUTO: 31.2 PG (ref 28–33.2)
MCHC RBC AUTO-ENTMCNC: 33.9 G/DL (ref 32.2–35.5)
MCV RBC AUTO: 92.1 FL (ref 83–98)
METHADONE UR QL SCN: NOT DETECTED
OPIATES UR QL SCN: NOT DETECTED
OXYCODONE UR QL SCN: NOT DETECTED
PCP UR QL SCN: NOT DETECTED
PDW BLD AUTO: 10.9 FL (ref 9.4–12.3)
PLATELET # BLD AUTO: 187 K/UL (ref 150–369)
RBC # BLD AUTO: 4.2 M/UL (ref 3.93–5.22)
SARS-COV-2 RNA RESP QL NAA+PROBE: NEGATIVE
SPECIMEN EXP DATE BLD: NORMAL
T PALLIDUM AB SER QL IF: NONREACTIVE
WBC # BLD AUTO: 8.03 K/UL (ref 3.8–10.5)

## 2022-08-26 PROCEDURE — 27200130 HC EPIDURAL ANES TRAY

## 2022-08-26 PROCEDURE — 63600000 HC DRUGS/DETAIL CODE: Performed by: OBSTETRICS & GYNECOLOGY

## 2022-08-26 PROCEDURE — 37000005 HC ANESTHESIA EPIDURAL/SPINAL

## 2022-08-26 PROCEDURE — 86780 TREPONEMA PALLIDUM: CPT | Performed by: OBSTETRICS & GYNECOLOGY

## 2022-08-26 PROCEDURE — 63600000 HC DRUGS/DETAIL CODE: Performed by: ANESTHESIOLOGY

## 2022-08-26 PROCEDURE — 10907ZC DRAINAGE OF AMNIOTIC FLUID, THERAPEUTIC FROM PRODUCTS OF CONCEPTION, VIA NATURAL OR ARTIFICIAL OPENING: ICD-10-PCS | Performed by: OBSTETRICS & GYNECOLOGY

## 2022-08-26 PROCEDURE — 63700000 HC SELF-ADMINISTRABLE DRUG

## 2022-08-26 PROCEDURE — 25000000 HC PHARMACY GENERAL: Performed by: OBSTETRICS & GYNECOLOGY

## 2022-08-26 PROCEDURE — 36415 COLL VENOUS BLD VENIPUNCTURE: CPT | Performed by: OBSTETRICS & GYNECOLOGY

## 2022-08-26 PROCEDURE — U0002 COVID-19 LAB TEST NON-CDC: HCPCS | Performed by: OBSTETRICS & GYNECOLOGY

## 2022-08-26 PROCEDURE — 25800000 HC PHARMACY IV SOLUTIONS: Performed by: ANESTHESIOLOGY

## 2022-08-26 PROCEDURE — 25000000 HC PHARMACY GENERAL

## 2022-08-26 PROCEDURE — 80307 DRUG TEST PRSMV CHEM ANLYZR: CPT | Performed by: OBSTETRICS & GYNECOLOGY

## 2022-08-26 PROCEDURE — 25800000 HC PHARMACY IV SOLUTIONS

## 2022-08-26 PROCEDURE — 72000012 HC VAGINAL DELIVERY LEVEL 2

## 2022-08-26 PROCEDURE — 25000000 HC PHARMACY GENERAL: Performed by: ANESTHESIOLOGY

## 2022-08-26 PROCEDURE — G0480 DRUG TEST DEF 1-7 CLASSES: HCPCS | Performed by: OBSTETRICS & GYNECOLOGY

## 2022-08-26 PROCEDURE — 63700000 HC SELF-ADMINISTRABLE DRUG: Performed by: OBSTETRICS & GYNECOLOGY

## 2022-08-26 PROCEDURE — 87340 HEPATITIS B SURFACE AG IA: CPT | Performed by: OBSTETRICS & GYNECOLOGY

## 2022-08-26 PROCEDURE — 3E0E7GC INTRODUCTION OF OTHER THERAPEUTIC SUBSTANCE INTO PRODUCTS OF CONCEPTION, VIA NATURAL OR ARTIFICIAL OPENING: ICD-10-PCS | Performed by: OBSTETRICS & GYNECOLOGY

## 2022-08-26 PROCEDURE — 12000000 HC ROOM AND CARE MED/SURG

## 2022-08-26 PROCEDURE — 63600000 HC DRUGS/DETAIL CODE

## 2022-08-26 PROCEDURE — 27200121 HC CATH FOLEY

## 2022-08-26 PROCEDURE — 86900 BLOOD TYPING SEROLOGIC ABO: CPT

## 2022-08-26 PROCEDURE — 85027 COMPLETE CBC AUTOMATED: CPT | Performed by: OBSTETRICS & GYNECOLOGY

## 2022-08-26 RX ORDER — MISOPROSTOL 200 UG/1
1000 TABLET ORAL ONCE AS NEEDED
Status: CANCELLED | OUTPATIENT
Start: 2022-08-26

## 2022-08-26 RX ORDER — FENTANYL CITRATE 50 UG/ML
INJECTION, SOLUTION INTRAMUSCULAR; INTRAVENOUS AS NEEDED
Status: DISCONTINUED | OUTPATIENT
Start: 2022-08-26 | End: 2022-08-26 | Stop reason: SURG

## 2022-08-26 RX ORDER — OXYTOCIN/0.9 % SODIUM CHLORIDE 40/1000ML
PLASTIC BAG, INJECTION (ML) INTRAVENOUS
Status: COMPLETED
Start: 2022-08-26 | End: 2022-08-26

## 2022-08-26 RX ORDER — DIPHENHYDRAMINE HCL 25 MG
25 CAPSULE ORAL EVERY 6 HOURS PRN
Status: DISCONTINUED | OUTPATIENT
Start: 2022-08-26 | End: 2022-08-28 | Stop reason: HOSPADM

## 2022-08-26 RX ORDER — SODIUM CHLORIDE, SODIUM LACTATE, POTASSIUM CHLORIDE, CALCIUM CHLORIDE 600; 310; 30; 20 MG/100ML; MG/100ML; MG/100ML; MG/100ML
125 INJECTION, SOLUTION INTRAVENOUS CONTINUOUS
Status: DISCONTINUED | OUTPATIENT
Start: 2022-08-26 | End: 2022-08-26

## 2022-08-26 RX ORDER — FENTANYL/ROPIVACAINE/NS/PF 2-1500 MCG
PREFILLED PUMP RESERVOIR INJECTION CONTINUOUS
Status: DISCONTINUED | OUTPATIENT
Start: 2022-08-26 | End: 2022-08-26

## 2022-08-26 RX ORDER — METHYLERGONOVINE MALEATE 0.2 MG/ML
200 INJECTION INTRAVENOUS ONCE AS NEEDED
Status: CANCELLED | OUTPATIENT
Start: 2022-08-26

## 2022-08-26 RX ORDER — LIDOCAINE HYDROCHLORIDE 10 MG/ML
0-30 INJECTION, SOLUTION EPIDURAL; INFILTRATION; INTRACAUDAL; PERINEURAL ONCE AS NEEDED
Status: CANCELLED | OUTPATIENT
Start: 2022-08-26

## 2022-08-26 RX ORDER — LIDOCAINE HYDROCHLORIDE AND EPINEPHRINE 15; 5 MG/ML; UG/ML
INJECTION, SOLUTION EPIDURAL AS NEEDED
Status: DISCONTINUED | OUTPATIENT
Start: 2022-08-26 | End: 2022-08-26 | Stop reason: SURG

## 2022-08-26 RX ORDER — OXYTOCIN/0.9 % SODIUM CHLORIDE 40/1000ML
500 PLASTIC BAG, INJECTION (ML) INTRAVENOUS ONCE
Status: COMPLETED | OUTPATIENT
Start: 2022-08-26 | End: 2022-08-26

## 2022-08-26 RX ORDER — ONDANSETRON 4 MG/1
4 TABLET, ORALLY DISINTEGRATING ORAL EVERY 6 HOURS PRN
Status: DISCONTINUED | OUTPATIENT
Start: 2022-08-26 | End: 2022-08-28 | Stop reason: HOSPADM

## 2022-08-26 RX ORDER — ALUMINUM HYDROXIDE, MAGNESIUM HYDROXIDE, AND SIMETHICONE 1200; 120; 1200 MG/30ML; MG/30ML; MG/30ML
30 SUSPENSION ORAL EVERY 4 HOURS PRN
Status: DISCONTINUED | OUTPATIENT
Start: 2022-08-26 | End: 2022-08-28 | Stop reason: HOSPADM

## 2022-08-26 RX ORDER — ACETAMINOPHEN 325 MG/1
650 TABLET ORAL EVERY 4 HOURS PRN
Status: DISCONTINUED | OUTPATIENT
Start: 2022-08-26 | End: 2022-08-28 | Stop reason: HOSPADM

## 2022-08-26 RX ORDER — FENTANYL/ROPIVACAINE/NS/PF 2-1500 MCG
PREFILLED PUMP RESERVOIR INJECTION
Status: COMPLETED
Start: 2022-08-26 | End: 2022-08-26

## 2022-08-26 RX ORDER — OXYTOCIN 10 [USP'U]/ML
10 INJECTION, SOLUTION INTRAMUSCULAR; INTRAVENOUS ONCE AS NEEDED
Status: CANCELLED | OUTPATIENT
Start: 2022-08-26

## 2022-08-26 RX ORDER — AMOXICILLIN 250 MG
1 CAPSULE ORAL 2 TIMES DAILY
Status: DISCONTINUED | OUTPATIENT
Start: 2022-08-26 | End: 2022-08-28 | Stop reason: HOSPADM

## 2022-08-26 RX ORDER — FENTANYL/ROPIVACAINE/NS/PF 2-1500 MCG
PREFILLED PUMP RESERVOIR INJECTION CONTINUOUS PRN
Status: DISCONTINUED | OUTPATIENT
Start: 2022-08-26 | End: 2022-08-26 | Stop reason: SURG

## 2022-08-26 RX ORDER — OXYTOCIN/0.9 % SODIUM CHLORIDE 40/1000ML
PLASTIC BAG, INJECTION (ML) INTRAVENOUS CONTINUOUS
Status: DISCONTINUED | OUTPATIENT
Start: 2022-08-26 | End: 2022-08-26

## 2022-08-26 RX ORDER — EPHEDRINE SULFATE/0.9% NACL/PF 50 MG/5 ML
SYRINGE (ML) INTRAVENOUS
Status: COMPLETED
Start: 2022-08-26 | End: 2022-08-26

## 2022-08-26 RX ORDER — OXYTOCIN/0.9 % SODIUM CHLORIDE 30/500 ML
0-20 PLASTIC BAG, INJECTION (ML) INTRAVENOUS CONTINUOUS
Status: DISCONTINUED | OUTPATIENT
Start: 2022-08-26 | End: 2022-08-26

## 2022-08-26 RX ORDER — IBUPROFEN 600 MG/1
600 TABLET ORAL EVERY 6 HOURS
Status: DISCONTINUED | OUTPATIENT
Start: 2022-08-26 | End: 2022-08-28 | Stop reason: HOSPADM

## 2022-08-26 RX ORDER — CALCIUM CARBONATE 200(500)MG
500 TABLET,CHEWABLE ORAL EVERY 4 HOURS PRN
Status: DISCONTINUED | OUTPATIENT
Start: 2022-08-26 | End: 2022-08-28 | Stop reason: HOSPADM

## 2022-08-26 RX ORDER — TRANEXAMIC ACID 10 MG/ML
1000 INJECTION, SOLUTION INTRAVENOUS ONCE AS NEEDED
Status: CANCELLED | OUTPATIENT
Start: 2022-08-26

## 2022-08-26 RX ORDER — EPHEDRINE SULFATE/0.9% NACL/PF 50 MG/5 ML
10 SYRINGE (ML) INTRAVENOUS AS NEEDED
Status: DISCONTINUED | OUTPATIENT
Start: 2022-08-26 | End: 2022-08-26

## 2022-08-26 RX ORDER — DIBUCAINE 1 %
1 OINTMENT (GRAM) TOPICAL AS NEEDED
Status: DISCONTINUED | OUTPATIENT
Start: 2022-08-26 | End: 2022-08-28 | Stop reason: HOSPADM

## 2022-08-26 RX ORDER — IBUPROFEN 600 MG/1
TABLET ORAL
Status: COMPLETED
Start: 2022-08-26 | End: 2022-08-26

## 2022-08-26 RX ORDER — BUPIVACAINE HYDROCHLORIDE 2.5 MG/ML
INJECTION, SOLUTION EPIDURAL; INFILTRATION; INTRACAUDAL AS NEEDED
Status: DISCONTINUED | OUTPATIENT
Start: 2022-08-26 | End: 2022-08-26 | Stop reason: SURG

## 2022-08-26 RX ORDER — SODIUM CHLORIDE, SODIUM LACTATE, POTASSIUM CHLORIDE, CALCIUM CHLORIDE 600; 310; 30; 20 MG/100ML; MG/100ML; MG/100ML; MG/100ML
180 INJECTION, SOLUTION INTRAVENOUS CONTINUOUS
Status: DISCONTINUED | OUTPATIENT
Start: 2022-08-26 | End: 2022-08-26

## 2022-08-26 RX ORDER — FENTANYL CITRATE 50 UG/ML
INJECTION, SOLUTION INTRAMUSCULAR; INTRAVENOUS
Status: COMPLETED
Start: 2022-08-26 | End: 2022-08-26

## 2022-08-26 RX ORDER — CARBOPROST TROMETHAMINE 250 UG/ML
250 INJECTION, SOLUTION INTRAMUSCULAR ONCE AS NEEDED
Status: CANCELLED | OUTPATIENT
Start: 2022-08-26

## 2022-08-26 RX ADMIN — FENTANYL CITRATE 50 ML: 0.05 INJECTION, SOLUTION INTRAMUSCULAR; INTRAVENOUS at 14:50

## 2022-08-26 RX ADMIN — SODIUM CHLORIDE, POTASSIUM CHLORIDE, SODIUM LACTATE AND CALCIUM CHLORIDE 125 ML/HR: 600; 310; 30; 20 INJECTION, SOLUTION INTRAVENOUS at 09:47

## 2022-08-26 RX ADMIN — FENTANYL CITRATE 50 ML: 0.05 INJECTION, SOLUTION INTRAMUSCULAR; INTRAVENOUS at 06:08

## 2022-08-26 RX ADMIN — FENTANYL CITRATE 100 MCG: 50 INJECTION, SOLUTION INTRAMUSCULAR; INTRAVENOUS at 06:21

## 2022-08-26 RX ADMIN — ACETAMINOPHEN 650 MG: 325 TABLET ORAL at 23:26

## 2022-08-26 RX ADMIN — IBUPROFEN 600 MG: 600 TABLET, FILM COATED ORAL at 18:43

## 2022-08-26 RX ADMIN — BUPIVACAINE HYDROCHLORIDE 8 ML: 2.5 INJECTION, SOLUTION EPIDURAL; INFILTRATION; INTRACAUDAL at 16:26

## 2022-08-26 RX ADMIN — SODIUM CHLORIDE, POTASSIUM CHLORIDE, SODIUM LACTATE AND CALCIUM CHLORIDE 1000 ML: 600; 310; 30; 20 INJECTION, SOLUTION INTRAVENOUS at 06:39

## 2022-08-26 RX ADMIN — Medication 10 ML/HR: at 06:22

## 2022-08-26 RX ADMIN — Medication 10 MG: at 08:06

## 2022-08-26 RX ADMIN — OXYTOCIN-SODIUM CHLORIDE 0.9% IV SOLN 30 UNIT/500ML 1 MILLI-UNITS/MIN: 30-0.9/5 SOLUTION at 12:25

## 2022-08-26 RX ADMIN — IBUPROFEN 600 MG: 600 TABLET ORAL at 18:43

## 2022-08-26 RX ADMIN — Medication 40 UNITS: at 17:23

## 2022-08-26 RX ADMIN — Medication 20 UNITS: at 17:15

## 2022-08-26 RX ADMIN — LIDOCAINE HYDROCHLORIDE,EPINEPHRINE BITARTRATE 4 ML: 15; .005 INJECTION, SOLUTION EPIDURAL; INFILTRATION; INTRACAUDAL; PERINEURAL at 06:20

## 2022-08-26 RX ADMIN — SODIUM CHLORIDE, POTASSIUM CHLORIDE, SODIUM LACTATE AND CALCIUM CHLORIDE 125 ML/HR: 600; 310; 30; 20 INJECTION, SOLUTION INTRAVENOUS at 06:39

## 2022-08-26 RX ADMIN — FENTANYL CITRATE 50 ML: 0.05 INJECTION, SOLUTION INTRAMUSCULAR; INTRAVENOUS at 10:53

## 2022-08-26 ASSESSMENT — PATIENT HEALTH QUESTIONNAIRE - PHQ9: SUM OF ALL RESPONSES TO PHQ9 QUESTIONS 1 & 2: 0

## 2022-08-26 ASSESSMENT — COGNITIVE AND FUNCTIONAL STATUS - GENERAL: DO YOU HAVE SERIOUS DIFFICULTY WALKING OR CLIMBING STAIRS: NO

## 2022-08-26 NOTE — L&D DELIVERY NOTE
OB Vaginal Delivery Note    Delivery:2022 at 4:59 PM   Patient:Linette Bee  :1990     Review the Delivery Report for details.     Delivery Details    Pre-Op Diagnosis: 1. 32 y.o.  intrauterine pregnancy at 39w5d with mcdonald gestation.     Post-Op Diagnosis: 1. S/p    Delivery Clinician: Jocy Osborn    Delivery Assist;Delivery Nurse;Nursery Nurse  Lauren Lyons;Angelica Vee;Radha Mayorga    Delivery Type: Vaginal, Spontaneous    Labor Complications: Non Reasurring Fetal Tracing    EBL: 350  mL   Anesthesia Type: Epidural    Placenta Delivery Spontaneous    Placenta Disposition: discarded    Additional Specimens Cord Blood      INFANT INFORMATION  Time of Birth:4:59 PM   Presentation:    Position:Left ,Occiput ,Anterior   Cord: 3 vessels ,Complications:None   [place] Sex: female   [place] Weight:       1 Minute 5 Minute 10 Minute   Apgar Totals: 8   9          Information for the patient's :  Darby Bee [[id number]]     [place] Cord Gas     None           Delivery Details:    Linette Bee is a 32 y.o.  at 39w5d gestation who presented to the hospital for Normal labor and delivery [O80].  Her labor was augmented oxytocin and arom.  The patient progressed to fully dilated and pushed for  hours and   minutes.  A viable  female  infant was delivered by Vaginal, Spontaneous  from Left ,Occiput ,Anterior .  The shoulders delivered without difficulty. The baby was vigorous and placed on the mom's chest. The cord was clamped x 2 and cut by dad.  The placenta delivered spontaneously, intact.  Fundal massage was performed and the fundus was found to be firm, and lochia was within normal limits. The perineum, vagina, cervix were inspected, and the following lacerations were noted:      Episiotomy: None    Lacerations:   Perineal: None  Repaired:      Periurethral:    Repaired:     Labial:   Repaired:     Sulcus:   Repaired:     Vaginal:   Repaired:      Cervical:    Repaired:        Any lacerations were repaired in the usual fashion using None  suture. Excellent hemostasis and cosmesis were noted. A vaginal sweep was performed and no retained sponges were palpated. At the completion of the case, sponge and needle counts were correct. The sponge count was verified as correct with the Sonnedix sponge counter device. The infant and patient were left in the delivery room in stable condition.     Jocy Osborn MD

## 2022-08-26 NOTE — ANESTHESIA PREPROCEDURE EVALUATION
Anesthesia ROS/MED HX    Anesthesia History    Previous anesthetics  No family history of anesthetic complications   History of anesthetic complications  Cardiovascular   Covid19 Test Reviewed  Endo/Other  Body Habitus: Morbidly Obese      Relevant Problems   No relevant active problems     Patient Active Problem List   Diagnosis   • Normal labor and delivery       Past Surgical History:   Procedure Laterality Date   • WISDOM TOOTH EXTRACTION         Current Facility-Administered Medications   Medication Dose Route Frequency   • ePHEDrine sulfate-0.9%NaCl(PF)       • fentaNYL (PF)       • lactated ringer's  1,000 mL intravenous Once    Followed by   • lactated ringer's  125 mL/hr intravenous Continuous   • oxytocin in NSS           Prior to Admission medications    Medication Sig Start Date End Date Taking? Authorizing Provider   prenatal vit no.130-iron-folic 27 mg iron- 800 mcg tablet tablet Take 1 tablet by mouth daily.   Yes Provider, Historical, MD       CBC Results       08/26/22     0526    WBC 8.03    RBC 4.20    HGB 13.1    HCT 38.7    MCV 92.1    MCH 31.2    MCHC 33.9              BMP Results    No lab values to display.         No results found for: HCGPREGUR, PREGSERUM, HCG, HCGQUANT          No orders to display       Physical Exam    Airway   Mallampati: II   TM distance: <3 FB   Neck ROM: full  Cardiovascular - normal   Rhythm: regular   Rate: normalPulmonary - normal   clear to auscultation  Other Findings   Back - neg   landmarks identified          Anesthesia Plan    Plan: labor epidural    Technique: epidural     Airway: natural airway / supplemental oxygen   ASA 2  Anesthetic plan and risks discussed with: patient and significant other

## 2022-08-26 NOTE — ANESTHESIA PROCEDURE NOTES
Epidural Block    Patient location during procedure: OB  Start time: 8/26/2022 6:02 AM  End time: 8/26/2022 6:22 AM  Reason for block: labor analgesia requested by patient and obstetrician  Staffing  Performed: anesthesiologist   Anesthesiologist: Scott Estes MD  Preanesthetic Checklist  Completed: patient identified, site marked, surgical consent, pre-op evaluation, timeout performed, IV checked, risks and benefits discussed, monitors and equipment checked, anesthesia consent given and sterile field maintained during procedure  Additional Notes  Details of the Insertion of a Lumbar Epidural Catheter     A custom Arrow epidural kit was used. Pre-procedural hand washing was done. The patient was identified verbally by her stated name and by her ID bracelet. The informed consent process occurred. A pre-procedure time out was done. A surgical hat, a surgical mask, and sterile gloves were used. The patient was monitored with a pulse oximeter. The patient's blood pressure was monitored with an appropriately sized and properly positioned blood pressure cuff. Fetal heart rate was monitored before, during, and after placement of the epidural catheter.  The maternal vital signs are recorded and documented on the paper fetal heart rate monitoring strip.     Adherence to sterile technique was employed. The patient was seated. The skin over the lumbar spine was widely swabbed with tinted ChloraPrep. After the back was prepped, a custom epidural kit was opened and sterile gloves were donned. The kit's normal saline solution, the kit's 1.5% lidocaine with 5 mcg epinephrine/ml, and the kit's 1% lidocaine were drawn through the kit's sterile filtered straw, each into the kit's respective individual syringe. After the ChloraPrep was dry, a 1% lidocaine skin weal was raised at the chosen lumbar interspace. The kit's 17-gauge Touhy needle was advanced to identify the epidural space using the loss-of-resistance technique with  the kit's sterile glass loss-of-resistance syringe that contained 4 ml of the kit's sterile normal saline solution and a small air bubble. There was no paresthesia produced by insertion of the Touhy needle. The insertion of the Touhy needle yielded neither blood nor CSF. The kit's flex-tip catheter was removed from its sterile package immediately before use and advanced into the epidural space. There was no paresthesia produced during insertion of the flex-tip catheter.     The Touhy needle was removed over the epidural catheter while preventing retrograde motion of the catheter relative to the needle. The catheter-grasping adapter was connected to the flex-tip catheter. The flex-tip catheter was gently aspirated by syringe. Neither CSF nor blood was withdrawn through the catheter during several careful and gentle aspirations. The custom kit's 0.2 micron filter was then attached to the proximal end of the epidural catheter. A 4 ml test dose of 1.5% lidocaine with 5 mcg epinephrine/ml was administered through the 0.2 micron filter. Neither spinal anesthesia nor tachycardia were associated with the injection of the test dose. The lumbar interspace used was L3-L4.      Based on observation of the patient's vital signs, the loss of resistance, and the response to the test dose, the position of the catheter's flex-tip was judged to be within the epidural space and neither subdural nor intravascular. Fentanyl, 100 mcg, was injected through the 0.2 micron filter. The custom epidural kit's sterile 3M Tegaderm CHG bio-occlusive dressing was applied to the catheter insertion site. The custom epidural kit's sterile tape was then used to further secure the catheter to the patient's skin.    The patient was placed in the supine position with left uterus displacement.  While wearing new fresh gloves, a patient controlled epidural analgesia infusion was started using a pre-packaged, pre-labeled, pharmacy-prepared syringe of 0.15%  ropivacaine solution containing fentanyl, 2 mcg/ml. Port-less, sterile, and color-coded infusion tubing was used to connect the pharmacy-prepared syringe of 0.15% ropivacaine solution containing fentanyl, 2 mcg/ ml to the 0.2 micron filter. The continuous epidural infusion was started using a key-locked Alaris PCA pump that was programmed with the following settings:   demand dose- 5 ml, lockout interval- 10 minutes, basal infusion rate- 10 ml/hour, & the one hour dose limit: 30 ml.    Centimeters at which the loss of resistance was noted: 7  Depth (in cm) at which the catheter is taped to the skin: 12

## 2022-08-26 NOTE — PROGRESS NOTES
Labor and Delivery Progress Note    Subjective        S: comforatble     Objective     Vital Signs:  Vitals:    22 1117   BP: (!) 113/58   Pulse: 76   Resp:    Temp:    SpO2:        Vital Signs for the last 24 hours:  Temp:  [36.3 °C (97.3 °F)-36.8 °C (98.2 °F)] 36.3 °C (97.3 °F)  Heart Rate:  [] 76  Resp:  [16-18] 16  BP: ()/(55-86) 113/58     Fetal Monitoring:  FHR Baseline: 140  FHR Variability: minimal  FHR Accelerations: absent  FHR Decelerations: present variable decels scalp lead appears to have a poor connection - will place external lead on a re- evaluate.     Contraction Frequency: 5  Pitocin: 0 mu/min          Latest cervical exam:  Cervical Dilation (cm): 7  Cervical Effacement: 90  Fetal Station: +1  Method: sterile exam per physician (22 1101)      Assessment/Plan     Linette Bee is a 32 y.o. female  at 39w5d admitted with initially for an EI but labored spontaneously. Cat 2 tracing - making cervical change. Have discussed FHRT with patient and possible need for  section. Pt receiving amnioinfusion - BTBV has improved - will watch for now.        FHR: Category II      Jocy Osborn MD

## 2022-08-26 NOTE — ANESTHESIOLOGIST PRE-PROCEDURE ATTESTATION
Pre-Procedure Patient Identification:  Upon entering the patient's room, I identified the patient & confirmed the following plan:  1. Trial of labor.  2. Patient controlled epidural analgesia, (PCEA), for labor pain.  3. Vaginal delivery by an attending obstetrician.  4. If trial of labor does not progress to a vaginal delivery, then .  5. Urgent or emergent  as indicated.

## 2022-08-26 NOTE — H&P
HPI     Linette Bee is a 32 y.o. female  at 39w5d with an estimated due date of 2022, by Patient Reported who presents with early labor. Scheduled for elective induction today.     course complicated by: first pregnancy SGA- on baby ASA. Last US 70%tile  Covid in December    OB History:   OB History    Para Term  AB Living   5 2 2 0 2 0   SAB IAB Ectopic Multiple Live Births   0 0 0 0 0      # Outcome Date GA Lbr Martín/2nd Weight Sex Delivery Anes PTL Lv   5 Current            4 Term 13           3 Term 11           2 AB            1 AB                Medical History: History reviewed. No pertinent past medical history.    Surgical History:   Past Surgical History:   Procedure Laterality Date    WISDOM TOOTH EXTRACTION         Social History:   Social History     Socioeconomic History    Marital status: Single     Spouse name: None    Number of children: None    Years of education: None    Highest education level: None   Tobacco Use    Smoking status: Never Smoker    Smokeless tobacco: Never Used   Substance and Sexual Activity    Alcohol use: Not Currently    Drug use: Not Currently     Types: Marijuana    Sexual activity: Yes     Partners: Male        Family History:   Family History   Problem Relation Age of Onset    Hypertension Biological Mother        Allergies: Patient has no known allergies.    Prior to Admission medications    Medication Sig Start Date End Date Taking? Authorizing Provider   prenatal vit no.130-iron-folic 27 mg iron- 800 mcg tablet tablet Take 1 tablet by mouth daily.   Yes Provider, Historical, MD       Review of Systems  Pertinent items are noted in HPI.    Objective     Vital Signs for the last 24 hours:  Temp:  [36.8 °C (98.2 °F)] 36.8 °C (98.2 °F)  Heart Rate:  [] 88  Resp:  [18] 18  BP: (113-140)/(58-86) 113/58    Latest cervical exam:                    Fetal Monitoring:  FHR Baseline: 130  FHR Variability:  moderate  FHR Accelerations: absent  FHR Decelerations: present variables    Contraction Frequency: 5    Exam:  General Appearance: Alert, cooperative, no acute distress  Abdomen: gravid, nontender  Genitalia: See vaginal exam  Extremities: no edema or calf tenderness  Neurologic: grossly intact without focal deficits        Labs:  Blood type:O+  Antibody negative  HepB negative  RPR negative  HIV negative  Rubella immune    Assessment/Plan     Linette Bee is a 32 y.o. female  at 39w5d admitted for labor management, induction    1) FHR: Category II  2) GBS: negative  3) epidural now, hydration to improve FHT, will AROM and pitocin as tolerated. Consent for vaginal delivery signed.     Liliana Rossi MD

## 2022-08-27 PROCEDURE — 12000000 HC ROOM AND CARE MED/SURG

## 2022-08-27 PROCEDURE — 63700000 HC SELF-ADMINISTRABLE DRUG: Performed by: OBSTETRICS & GYNECOLOGY

## 2022-08-27 RX ORDER — IBUPROFEN 600 MG/1
600 TABLET ORAL EVERY 6 HOURS PRN
Start: 2022-08-27

## 2022-08-27 RX ORDER — ACETAMINOPHEN 325 MG/1
650 TABLET ORAL EVERY 4 HOURS PRN
Start: 2022-08-27 | End: 2022-09-26

## 2022-08-27 RX ADMIN — SENNOSIDES AND DOCUSATE SODIUM 1 TABLET: 50; 8.6 TABLET ORAL at 19:39

## 2022-08-27 RX ADMIN — IBUPROFEN 600 MG: 600 TABLET ORAL at 06:42

## 2022-08-27 RX ADMIN — IBUPROFEN 600 MG: 600 TABLET ORAL at 00:44

## 2022-08-27 RX ADMIN — ACETAMINOPHEN 650 MG: 325 TABLET ORAL at 12:45

## 2022-08-27 RX ADMIN — SENNOSIDES AND DOCUSATE SODIUM 1 TABLET: 50; 8.6 TABLET ORAL at 07:38

## 2022-08-27 RX ADMIN — PRENATAL VITAMINS-IRON FUMARATE 27 MG IRON-FOLIC ACID 0.8 MG TABLET 1 TABLET: at 07:38

## 2022-08-27 RX ADMIN — ACETAMINOPHEN 650 MG: 325 TABLET ORAL at 04:32

## 2022-08-27 RX ADMIN — ACETAMINOPHEN 650 MG: 325 TABLET ORAL at 19:38

## 2022-08-27 RX ADMIN — ACETAMINOPHEN 650 MG: 325 TABLET ORAL at 08:32

## 2022-08-27 RX ADMIN — IBUPROFEN 600 MG: 600 TABLET ORAL at 12:45

## 2022-08-27 RX ADMIN — IBUPROFEN 600 MG: 600 TABLET ORAL at 19:39

## 2022-08-27 NOTE — LACTATION NOTE
P3, Mom and baby doing OK.  Assisted with feeding.  Baby is not opening wide to latch, mainly tongue thrusting and lots of licking.  Mom can hand express drops of colostrum.  Mom has pump at home.  Lactation team will follow.

## 2022-08-27 NOTE — DISCHARGE SUMMARY
Inpatient Discharge Summary    BRIEF OVERVIEW  Admitting Provider: Liliana Rossi MD  Discharge Provider: Lissa Hammer MD  Primary care provider: Unable, To Obtain Pcp    Admission Date: 2022     Discharge Date: 2022    Discharge Diagnosis  Postpartum state    Discharge Disposition  Home    Discharge Medications     Medication List      START taking these medications    acetaminophen 325 mg tablet  Commonly known as: TYLENOL  Take 2 tablets (650 mg total) by mouth every 4 (four) hours as needed for pain.  Dose: 650 mg     ibuprofen 600 mg tablet  Commonly known as: MOTRIN  Take 1 tablet (600 mg total) by mouth every 6 (six) hours as needed for mild pain for up to 357 doses.  Dose: 600 mg        CONTINUE taking these medications    prenatal vit no.130-iron-folic 27 mg iron- 800 mcg tablet tablet  Take 1 tablet by mouth daily.  Dose: 1 tablet            Outpatient Follow-Up  Postpartum followup in 6 weeks        DETAILS OF HOSPITAL STAY      Linette Bee is a  who presented to L&D and had an uncomplicated vaginal delivery. She had a routine postpartum course and was discharged in stable condition. Discharge instructions were reviewed in detail.     She will follow up in 6 weeks.     Relevant consults: none  Relevant labs: none    Lissa Hammer MD  2022  8:33 AM

## 2022-08-27 NOTE — PROGRESS NOTES
Obstetrics Postpartum Progress Note  PPD#1 s/p     Events  No acute events overnight.    Subjective  Pain: no  Bleeding: lochia minimal  Diet: taking regular diet  Voiding: without difficulty  Bowel: passing flatus  Ambulating: as tolerated  Feeding: plans to breastfeed    Vitals  Temp:  [36.3 °C (97.3 °F)-37.1 °C (98.7 °F)] 36.7 °C (98.1 °F)  Heart Rate:  [] 84  Resp:  [16-18] 18  BP: ()/(48-88) 112/56    Physical Exam  General: well  Abdomen: soft, nondistended, non-tender  Fundus: firm, at umbilicus and nontender  Incision: not applicable, (vaginal delivery)  Extremities: symmetric and no edema    Assessment/Plan   Problem-based Assessment and Plan    Linette Bee is a 32 y.o.  postpartum day 1 s/p Vaginal, Spontaneous .    Appropriate postpartum course. Continue routine postpartum care.  Anticipate discharge home on 2022.  Reviewed all discharge instructions, precautions, follow-up appointments, discharge medications. All questions answered.    Lissa Hammer MD  2022  8:31 AM

## 2022-08-27 NOTE — PLAN OF CARE
Problem: Adult Inpatient Plan of Care  Goal: Plan of Care Review  Outcome: Progressing  Flowsheets (Taken 8/27/2022 1404)  Progress: improving  Plan of Care Reviewed With: patient  Outcome Summary: pain well-managed w/round-the-clock dosing, VSS, bleeding WDL   Plan of Care Review  Plan of Care Reviewed With: patient  Progress: improving  Outcome Summary: pain well-managed w/round-the-clock dosing, VSS, bleeding WDL  
Plan of Care Review  Outcome Summary: VSS, bleeding WNL, independent with self care, breastfeeding with minimal assistance, adjusting to maternal role well  
no

## 2022-08-27 NOTE — ANESTHESIA POSTPROCEDURE EVALUATION
Patient: Linette Bee    Procedure Summary     Date: 08/26/22 Room / Location:     Anesthesia Start: 0601 Anesthesia Stop: 1659    Procedure: Labor Analgesia Diagnosis:     Scheduled Providers:  Responsible Provider: Prachi Cordero MD    Anesthesia Type: labor epidural ASA Status: 2          Anesthesia Type: labor epidural  PACU Vitals    No data found in the last 10 encounters.           Anesthesia Post Evaluation    Pain management: adequate  Patient participation: complete - patient participated  Level of consciousness: awake and alert  Cardiovascular status: acceptable  Airway Patency: adequate  Respiratory status: acceptable  Hydration status: acceptable  Anesthetic complications: no

## 2022-08-27 NOTE — PROGRESS NOTES
Patient: Linette Bee  * No surgery found *  Patient location: PACU    Last vitals:   Vitals:    08/27/22 0716   BP: (!) 112/56   Pulse: 84   Resp: 18   Temp: 36.7 °C (98.1 °F)   SpO2:      Level of consciousness: awake, alert and oriented  Post-anesthesia pain: adequate analgesia  Anesthetic complications: no

## 2022-08-28 VITALS
WEIGHT: 260 LBS | HEART RATE: 75 BPM | BODY MASS INDEX: 43.32 KG/M2 | SYSTOLIC BLOOD PRESSURE: 122 MMHG | DIASTOLIC BLOOD PRESSURE: 59 MMHG | OXYGEN SATURATION: 99 % | HEIGHT: 65 IN | RESPIRATION RATE: 16 BRPM | TEMPERATURE: 98.1 F

## 2022-08-28 PROCEDURE — 63700000 HC SELF-ADMINISTRABLE DRUG: Performed by: OBSTETRICS & GYNECOLOGY

## 2022-08-28 RX ADMIN — SENNOSIDES AND DOCUSATE SODIUM 1 TABLET: 50; 8.6 TABLET ORAL at 07:49

## 2022-08-28 RX ADMIN — IBUPROFEN 600 MG: 600 TABLET ORAL at 07:50

## 2022-08-28 RX ADMIN — PRENATAL VITAMINS-IRON FUMARATE 27 MG IRON-FOLIC ACID 0.8 MG TABLET 1 TABLET: at 07:50

## 2022-08-28 RX ADMIN — IBUPROFEN 600 MG: 600 TABLET ORAL at 01:39

## 2022-08-28 RX ADMIN — ACETAMINOPHEN 650 MG: 325 TABLET ORAL at 07:50

## 2022-08-28 RX ADMIN — ACETAMINOPHEN 650 MG: 325 TABLET ORAL at 01:39

## 2022-08-28 NOTE — PROGRESS NOTES
Obstetrics Postpartum Progress Note  PPD#2 s/p     Events  No acute events overnight.    Subjective  Pain: no  Bleeding: lochia minimal  Diet: taking regular diet  Voiding: without difficulty  Bowel: passing flatus  Ambulating: as tolerated  Feeding: plans to breastfeed    Vitals  Temp:  [36.5 °C (97.7 °F)-36.9 °C (98.4 °F)] 36.7 °C (98.1 °F)  Heart Rate:  [75-88] 75  Resp:  [16-20] 16  BP: (104-122)/(59-65) 122/59    Physical Exam  General: well    Assessment/Plan   Problem-based Assessment and Plan    Linette Bee is a 32 y.o.  postpartum day 2 s/p Vaginal, Spontaneous .    Appropriate postpartum course. Continue routine postpartum care.  Discharge home today.  Reviewed all discharge instructions, precautions, follow-up appointments, discharge medications. All questions answered.    Lissa Hammer MD  2022  9:21 AM

## 2022-08-28 NOTE — NURSING NOTE
Pt and  were discharged to home at this time in apparently good condition, taking their belongings with them, accompanied by RN. Baby placed in rear-facing carseat by father. Baby's color is pink and no distress noted.     Shannon Calabrese RN  22

## 2022-08-28 NOTE — NURSING NOTE
Discharge instructions rev'd with pt, pt v/u of these instructions. Pt aware of when to follow-up with her provider. Copies of instructions provided, paperwork signed. Pt packing up, preparing for discharge.   Shannon Calabrese RN  08/28/22

## 2022-11-19 ENCOUNTER — HEALTH MAINTENANCE LETTER (OUTPATIENT)
Age: 32
End: 2022-11-19

## 2023-03-11 NOTE — LACTATION NOTE
P3, Mom started pumping yesterday and getting around 3.5ml colostrum.  Mom reports baby had a couple longer feedings, but, mostly still short and baby not latching well.  Recommended pumping after all feedings until baby starts breastfeeding for longer periods.  Bridge milk was started yesterday as well.  Parents purchased 4 bottles bridge milk: 9137-4 (3) (2) (10), 7863-4 (1).  Attempted feeding, baby didn't latch.  Baby got 25ml bridge milk in bottle before discharge and Mom going to pump when she gets home.  Rev'd BF resources for home.     Patient presented with complaints of left axilla rash.  Will be discharged antifungal and given outpatient follow-up.

## 2023-04-09 ENCOUNTER — HEALTH MAINTENANCE LETTER (OUTPATIENT)
Age: 33
End: 2023-04-09

## 2023-05-03 NOTE — TELEPHONE ENCOUNTER
At this time, Pt does not wish to start gabapentin until she can research it further. Per Pt, she researched Lyrica and felt comfortable starting on that medication. RN explained that it is often not covered.     Pt did endorse frustration due to an appointment not being available until July, however RN explained that she was put on a wait list as well.        Vital signs stable. Fundus firm, midline, 1cm below umbilicus. Lochia rubra and scant. Voiding without difficulty. Ambulating independently. Tylenol and Ibuprofen available for pain if needed, patient declining overnight. Breastfeeding and tolerating well with no assistance. FOB at bedside and attentive to and supportive of patient. Bonding well with infant. Independent with self and infant cares. Encouraging pt to call with any questions or concerns. Will monitor as needed and continue with plan of care.

## 2024-01-08 LAB
ABO (EXTERNAL): NORMAL
RH (EXTERNAL): POSITIVE

## 2024-05-16 ENCOUNTER — VIRTUAL VISIT (OUTPATIENT)
Dept: OBGYN | Facility: CLINIC | Age: 34
End: 2024-05-16
Payer: COMMERCIAL

## 2024-05-16 ENCOUNTER — TRANSCRIBE ORDERS (OUTPATIENT)
Dept: MATERNAL FETAL MEDICINE | Facility: CLINIC | Age: 34
End: 2024-05-16

## 2024-05-16 DIAGNOSIS — O09.529 SUPERVISION OF HIGH-RISK PREGNANCY OF ELDERLY MULTIGRAVIDA: Primary | ICD-10-CM

## 2024-05-16 DIAGNOSIS — O26.90 PREGNANCY RELATED CONDITION, ANTEPARTUM: Primary | ICD-10-CM

## 2024-05-16 PROCEDURE — 99207 PR NO CHARGE NURSE ONLY: CPT | Mod: 93

## 2024-05-16 RX ORDER — PNV NO.95/FERROUS FUM/FOLIC AC 28MG-0.8MG
TABLET ORAL
COMMUNITY
End: 2024-06-04

## 2024-05-16 RX ORDER — FLUTICASONE PROPIONATE 50 MCG
1 SPRAY, SUSPENSION (ML) NASAL DAILY
COMMUNITY

## 2024-05-16 RX ORDER — LORATADINE 10 MG/1
10 TABLET ORAL DAILY
COMMUNITY

## 2024-05-16 NOTE — NURSING NOTE
NPN nurse visit done over the phone. Pt will be given NPN folder and book at her upcoming appt.   Discussed optional screening available to assess chromosomal anomalies. Questions answered. Pt advised to call the clinic if she has any questions or concerns related to her pregnancy. Prenatal labs will be obtained at her upcoming appt. New prenatal visit scheduled on 6/4/24 with Dr. Plunkett.    Pt was seen earlier in her pregnancy at the [facility]. The records are in Care everywhere.     Anatomy scan has not been done. M referral placed.    23w5d    Menstrual cycles: regular    Last pap: 8/2020        Patient supplied answers from flow sheet for:  Prenatal OB Questionnaire.  Past Medical History  Have you ever recieved care for your mental health? : (!) Yes  Have you ever been in a major accident or suffered serious trauma?: No  Within the last year, has anyone hit, slapped, kicked or otherwise hurt you?: No  In the last year, has anyone forced you to have sex when you didn't want to?: No    Past Medical History 2   Have you ever received a blood transfusion?: No  Would you accept a blood transfusion if was medically recommended?: Yes  Does anyone in your home smoke?: No   Is your blood type Rh negative?: No  Have you ever ?: (!) Yes  Have you been hospitalized for a nonsurgical reason excluding normal delivery?: No  Have you ever had an abnormal pap smear?: (!) Yes    Past Medical History (Continued)  Do you have a history of abnormalities of the uterus?: No  Did your mother take ROSA or any other hormones when she was pregnant with you?: No  Do you have any other problems we have not asked about which you feel may be important to this pregnancy?: No         Yenni CABA RN

## 2024-05-28 ENCOUNTER — PRE VISIT (OUTPATIENT)
Dept: MATERNAL FETAL MEDICINE | Facility: CLINIC | Age: 34
End: 2024-05-28
Payer: COMMERCIAL

## 2024-06-04 ENCOUNTER — HOSPITAL ENCOUNTER (OUTPATIENT)
Dept: ULTRASOUND IMAGING | Facility: CLINIC | Age: 34
Discharge: HOME | End: 2024-06-04
Attending: OBSTETRICS & GYNECOLOGY
Payer: COMMERCIAL

## 2024-06-04 ENCOUNTER — OFFICE VISIT (OUTPATIENT)
Dept: MATERNAL FETAL MEDICINE | Facility: CLINIC | Age: 34
End: 2024-06-04
Attending: OBSTETRICS & GYNECOLOGY
Payer: COMMERCIAL

## 2024-06-04 ENCOUNTER — PRENATAL OFFICE VISIT (OUTPATIENT)
Dept: OBGYN | Facility: CLINIC | Age: 34
End: 2024-06-04
Payer: COMMERCIAL

## 2024-06-04 VITALS — SYSTOLIC BLOOD PRESSURE: 102 MMHG | BODY MASS INDEX: 30.9 KG/M2 | WEIGHT: 180 LBS | DIASTOLIC BLOOD PRESSURE: 58 MMHG

## 2024-06-04 DIAGNOSIS — O34.219 PREVIOUS CESAREAN DELIVERY, ANTEPARTUM CONDITION OR COMPLICATION: ICD-10-CM

## 2024-06-04 DIAGNOSIS — O26.90 PREGNANCY RELATED CONDITION, ANTEPARTUM: ICD-10-CM

## 2024-06-04 DIAGNOSIS — Z11.3 SCREEN FOR STD (SEXUALLY TRANSMITTED DISEASE): ICD-10-CM

## 2024-06-04 DIAGNOSIS — O09.522 MULTIGRAVIDA OF ADVANCED MATERNAL AGE IN SECOND TRIMESTER: Primary | ICD-10-CM

## 2024-06-04 DIAGNOSIS — O09.522 MULTIGRAVIDA OF ADVANCED MATERNAL AGE IN SECOND TRIMESTER: ICD-10-CM

## 2024-06-04 DIAGNOSIS — O09.32 INSUFFICIENT PRENATAL CARE IN SECOND TRIMESTER: ICD-10-CM

## 2024-06-04 DIAGNOSIS — O36.60X0 LARGE FOR GESTATIONAL AGE FETUS AFFECTING MANAGEMENT OF MOTHER: ICD-10-CM

## 2024-06-04 DIAGNOSIS — O34.219 PREVIOUS CESAREAN DELIVERY, ANTEPARTUM CONDITION OR COMPLICATION: Primary | ICD-10-CM

## 2024-06-04 DIAGNOSIS — Z31.5 ENCOUNTER FOR PROCREATIVE GENETIC COUNSELING: ICD-10-CM

## 2024-06-04 DIAGNOSIS — Z36.2 ENCOUNTER FOR FOLLOW-UP ULTRASOUND OF FETAL ANATOMY: Primary | ICD-10-CM

## 2024-06-04 DIAGNOSIS — R42 VERTIGO: ICD-10-CM

## 2024-06-04 PROBLEM — O09.30 INSUFFICIENT PRENATAL CARE: Status: ACTIVE | Noted: 2024-06-04

## 2024-06-04 PROBLEM — O09.529 AMA (ADVANCED MATERNAL AGE) MULTIGRAVIDA 35+: Status: ACTIVE | Noted: 2024-06-04

## 2024-06-04 LAB
ERYTHROCYTE [DISTWIDTH] IN BLOOD BY AUTOMATED COUNT: 13.4 % (ref 10–15)
GLUCOSE 1H P 50 G GLC PO SERPL-MCNC: 137 MG/DL (ref 70–129)
HCT VFR BLD AUTO: 32 % (ref 35–47)
HGB BLD-MCNC: 10.7 G/DL (ref 11.7–15.7)
MCH RBC QN AUTO: 31.4 PG (ref 26.5–33)
MCHC RBC AUTO-ENTMCNC: 33.4 G/DL (ref 31.5–36.5)
MCV RBC AUTO: 94 FL (ref 78–100)
PLATELET # BLD AUTO: 218 10E3/UL (ref 150–450)
RBC # BLD AUTO: 3.41 10E6/UL (ref 3.8–5.2)
T PALLIDUM AB SER QL: NONREACTIVE
WBC # BLD AUTO: 12.5 10E3/UL (ref 4–11)

## 2024-06-04 PROCEDURE — 87491 CHLMYD TRACH DNA AMP PROBE: CPT | Performed by: OBSTETRICS & GYNECOLOGY

## 2024-06-04 PROCEDURE — 99204 OFFICE O/P NEW MOD 45 MIN: CPT | Performed by: OBSTETRICS & GYNECOLOGY

## 2024-06-04 PROCEDURE — 85027 COMPLETE CBC AUTOMATED: CPT | Performed by: OBSTETRICS & GYNECOLOGY

## 2024-06-04 PROCEDURE — 99203 OFFICE O/P NEW LOW 30 MIN: CPT | Mod: 25 | Performed by: STUDENT IN AN ORGANIZED HEALTH CARE EDUCATION/TRAINING PROGRAM

## 2024-06-04 PROCEDURE — 76811 OB US DETAILED SNGL FETUS: CPT | Mod: 26 | Performed by: STUDENT IN AN ORGANIZED HEALTH CARE EDUCATION/TRAINING PROGRAM

## 2024-06-04 PROCEDURE — 76811 OB US DETAILED SNGL FETUS: CPT

## 2024-06-04 PROCEDURE — 82950 GLUCOSE TEST: CPT | Performed by: OBSTETRICS & GYNECOLOGY

## 2024-06-04 PROCEDURE — 86780 TREPONEMA PALLIDUM: CPT | Performed by: OBSTETRICS & GYNECOLOGY

## 2024-06-04 PROCEDURE — 87591 N.GONORRHOEAE DNA AMP PROB: CPT | Performed by: OBSTETRICS & GYNECOLOGY

## 2024-06-04 PROCEDURE — 36415 COLL VENOUS BLD VENIPUNCTURE: CPT | Performed by: OBSTETRICS & GYNECOLOGY

## 2024-06-04 PROCEDURE — 96040 HC GENETIC COUNSELING, EACH 30 MINUTES: CPT

## 2024-06-04 RX ORDER — VITAMIN A, VITAMIN C, VITAMIN D, VITAMIN E, THIAMINE, RIBOFLAVIN, NIACIN, VITAMIN B6, FOLIC ACID, VITAMIN B12, CALCIUM, IRON, ZINC, COPPER 4000; 120; 400; 22; 1.84; 3; 20; 10; 1; 12; 200; 27; 25; 2 [IU]/1; MG/1; [IU]/1; [IU]/1; MG/1; MG/1; MG/1; MG/1; MG/1; UG/1; MG/1; MG/1; MG/1; MG/1
1 TABLET ORAL
COMMUNITY
Start: 2024-03-09

## 2024-06-04 RX ORDER — ALBUTEROL SULFATE 90 UG/1
1-2 AEROSOL, METERED RESPIRATORY (INHALATION)
COMMUNITY
Start: 2024-01-08

## 2024-06-04 RX ORDER — ONDANSETRON 4 MG/1
4 TABLET, ORALLY DISINTEGRATING ORAL
COMMUNITY
Start: 2024-04-02 | End: 2024-06-04

## 2024-06-04 RX ORDER — FLUTICASONE PROPIONATE AND SALMETEROL XINAFOATE 230; 21 UG/1; UG/1
2 AEROSOL, METERED RESPIRATORY (INHALATION)
COMMUNITY
Start: 2024-01-08

## 2024-06-04 NOTE — PROGRESS NOTES
This is a 34 year old female patient,   who presents for her first obstetrical visit. This pregnancy is Unplanned, Desired.    EDC Sep 7, 2024 by Previous US which makes her 26w3d  today.  Her cycles are irregular.  Her last menstrual period was just spotting and unsure. Since her LMP, she has experienced  lightheadedness and dizziness for last month .  She denies vaginal discharge, pelvic pain, and vaginal bleeding. Ultrasound in the 1st trimester showed EDC inconsistent with dates by LMP.     MFM U/S today showed LGA.    OB History    Para Term  AB Living   5 3 2 1 1 4   SAB IAB Ectopic Multiple Live Births   0 1 0 1 4      # Outcome Date GA Lbr Doyle/2nd Weight Sex Type Anes PTL Lv   5 Current            4A  23 30w0d  0.81 kg (1 lb 12.6 oz) M CS-Unspec Spinal  LANDON      Complications: Fetal Intolerance      Name: DAVID POWELL A CHASIDY      Apgar1: 8  Apgar5: 9   4B  23 30w0d  1.47 kg (3 lb 3.9 oz) M CS-Unspec Spinal  LANDON      Complications: Fetal Intolerance      Name: SHERRYDAVID LARIOS CHASIDY      Apgar1: 7  Apgar5: 8   3 Term 17 40w1d 05:45 / 00:45 3.6 kg (7 lb 15 oz) F Vag-Spont EPI N LANDON      Name: SHREE POWELL1 CHASIDY      Apgar1: 8  Apgar5: 9   2 Term 09/10/14 39w0d 03:55 / 04:40 3.46 kg (7 lb 10.1 oz) M Vag-Spont EPI N LANDON      Name: Ian      Apgar1: 8  Apgar5: 9   1 IAB                History of GDM: No,  PTL : No,  History of HTN in pregnancy: No,  Thrombocytopenia: No,  Shoulder dystocia: No,  Vacuum Extraction: No  PPH: No   3rd of 4th degree laceration: No.   Other complications: Hx LTCS x 1 (Doc) last preg for twins with absent end-diastolic flow req C/S      Since her last LMP she denies use of alcohol, tobacco and street drugs.    HISTORY:  Past Medical History:   Diagnosis Date    Abnormal Pap smear     HPV & normal     Abnormal Pap smear of cervix     Anemia     not on iron vits, O+ blood type    Anxiety     Depressive disorder     History of human  papillomavirus infection     Migraines     Mirena IUD insertion 08/10/2017    Lot #  [id number] Exp. Date (last month of insertion) 2019 NDC # 14735-401-77 REMOVE BY AUG. 2022     Panic attacks     Postpartum depression     Uncomplicated asthma     Varicella     Varicosities      Past Surgical History:   Procedure Laterality Date     SECTION  2023    wisdom teeth      ZZC INDUCED ABORTN BY DIL/EVAC  2005     Family History   Problem Relation Age of Onset    Hypertension Mother     Neurologic Disorder Mother         migraine headache    Depression Sister     Cerebrovascular Disease Maternal Grandfather     No Known Problems Daughter     No Known Problems Son      Social History     Socioeconomic History    Marital status:      Spouse name: Alden    Number of children: 4    Years of education: 11    Highest education level: None   Occupational History    Occupation: student   Tobacco Use    Smoking status: Former     Current packs/day: 0.00     Average packs/day: 0.3 packs/day for 1 year (0.3 ttl pk-yrs)     Types: Cigarettes     Start date: 10/19/2013     Quit date: 10/19/2014     Years since quittin.6    Smokeless tobacco: Former    Tobacco comments:     5 cigs per day   Vaping Use    Vaping status: Never Used   Substance and Sexual Activity    Alcohol use: No    Drug use: No    Sexual activity: Yes     Partners: Male     Birth control/protection: None     Social Determinants of Health     Financial Resource Strain: Low Risk  (2023)    [facility]    Financial Resource Strain     Difficulty of Paying Living Expenses: 3   Food Insecurity: No Food Insecurity (2023)    [facility]    Food Insecurity     Worried About Running Out of Food in the Last Year: 1   Transportation Needs: No  Transportation Needs (2023)    Received from [facility], [facility]    Transportation Needs     Lack of Transportation (Medical): 1   Social Connections: Socially Integrated (2023)    Received from [facility], [facility]    Social Connections     Frequency of Communication with Friends and Family: 0   Housing Stability: Low Risk  (2023)    Received from [facility], [facility]    Housing Stability     Unable to Pay for Housing in the Last Year: 1     Current Outpatient Medications   Medication Sig Dispense Refill    albuterol (PROAIR HFA/PROVENTIL HFA/VENTOLIN HFA) 108 (90 Base) MCG/ACT inhaler Inhale 1-2 puffs into the lungs      fluticasone (FLONASE) 50 MCG/ACT nasal spray Spray 1 spray into both nostrils daily      fluticasone-salmeterol (ADVAIR-HFA) 230-21 MCG/ACT inhaler Inhale 2 puffs into the lungs      loratadine (CLARITIN) 10 MG tablet Take 10 mg by mouth daily      ondansetron (ZOFRAN ODT) 4 MG ODT tab Place 4 mg under the tongue      Prenatal Vit-Fe Fumarate-FA (M-EUGENE PLUS) 27-1 MG TABS Take 1 tablet by mouth daily at 2 pm       No current facility-administered medications for this visit.     No Known Allergies    Past medical, surgical, social and family history were reviewed and updated in EPIC.    ROS:   12 point review of systems negative other than symptoms noted below.    EXAM:  /58 (BP Location: Right arm, Patient Position: Sitting, Cuff Size: Adult Regular)   Wt 81.6 kg (180 lb)   LMP 2023 (Approximate)   BMI 30.90 kg/m     BMI: Body mass index is 30.9 kg/m .    EXAM:  Constitutional: Appearance: Well nourished, well developed alert, in no acute distress  Chest:  Respiratory Effort:  Breathing unlabored  Cardiovascular:Heart    Auscultation:  Regular rate, normal  rhythm, no murmurs present  Gastrointestinal:  Abdominal Examination:  Abdomen nontender to palpation, tone normal without     rigidity or guarding, no masses present, umbilicus without lesions    Liver and speen:  No hepatomegaly present, liver nontender to palpation    Hernias:  No hernias present    FHTs auscultated at 141.  Skin:  General Inspection:  No rashes present, no lesions present, no areas of  discoloration.  Neurologic/Psychiatric:    Mental Status:  Oriented X3       Pelvis: Deferred per Pt request. Will do GC/Chlam self-collect.    ASSESSMENT:      ICD-10-CM    1. Previous  delivery, antepartum condition or complication  O34.219       2. Multigravida of advanced maternal age in second trimester  O09.522       3. Insufficient prenatal care in second trimester  O09.32       4. Vertigo  R42           PLAN:    Prenatal labs from [facility] reviewed. She has no questions.    GC/Chlam self collect.  Will defer Pap/HPV co-test until her 6 week PP checkup.    Will do 28 week labs today to r/o GDM given LGA baby on today's MFM U/S.    Offered Pt ENT referral for vertigo sx's but she declined. She can let us know if she wants to see them at any point.    Pt is leaning towards TOLAC since she is a candidate and had 2 SVDs before her LTCS. Will sign TOLAC consent at upcoming visit.    Reviewed early pregnancy education, diet, exercise, prenatal vitamins, intercourse. Reviewed the call schedule, labor and delivery, and the schedule of prenatal visits.    Has follow-up MFM U/S in 3 weeks.    RTC 4 weeks. She is encouraged to call sooner with questions or concerns. Of note, she strongly wants female provider for her OB care, but understands that half the group is male and she is okay with male provider delivering.      Bacilio Plunkett MD  [facility]

## 2024-06-04 NOTE — PROGRESS NOTES
[facility]  Genetic Counseling Consult    Patient:  Carolyn Jacobs  Preferred Name: Carolyn YOB: 1990   Date of Service:  24   MRN: [id number]    Carolyn was seen at the [facility] for genetic consultation. The indication for genetic counseling is advanced maternal age. The patient was unaccompanied to this visit.     The session was conducted in English.      IMPRESSION/ PLAN   1. Carolyn had genetic screening earlier in this pregnancy. Their non-invasive prenatal test was screen negative or low risk for screened conditions     2. During today's [facility] visit, Carolyn had a genetic counseling session only. Carolyn has already had genetic testing in this pregnancy. Additional screening and diagnostic testing was discussed for the gestational age and declined.    3. Since the patient chose aneuploidy screening via NIPT, quad screen is NOT recommended in the second trimester. If the patient desires screening for open neural tube defects, maternal serum AFP only is recommended, ideally between 16-18 weeks gestation.    4. Carolyn had a level II comprehensive anatomy ultrasound today. Please see the ultrasound report for further details.    5. Further recommendation include a follow-up ultrasound with [facility]. The upcoming ultrasound has been scheduled for 2024.    PREGNANCY HISTORY   /Parity:       Carolyn's pregnancy history is significant for:   An  in  with a prior partner  A son (9y) born vaginally in 2011 with a prior partner  A daughter (7y) born vaginally in 2017 with a prior partner  Twin fraternal sons (1y) born  via  section in 2023 with current partner    CURRENT PREGNANCY   Current Age: 34 year old   Age at Delivery: 34 year old  ANDREW: 2024, by Ultrasound                                   Gestational Age: 26w3d  This pregnancy is a single  "gestation.   Carolyn reports light-headedness and fatigue recently in pregnancy. She reported light-headedness in her appointment for which she was moved to the ultrasound room where she could lay down. Alerted M RN who took her vitals. After laying down and having juice/granola bar patient reported feeling better.  Carolyn reports feeling overwhelmed and fatigued recently.   Time was spent providing psychosocial counseling. She reports she has a good support system in place. She declines a referral to [facility] at this time. She was encouraged to reach out to her healthcare team if she finds herself struggling with her mental health.    MEDICAL HISTORY   Carolyn s reported medical history is not expected to impact pregnancy management or risks to fetal development.       FAMILY HISTORY   A three-generation pedigree was obtained today and is scanned under the \"Media\" tab in Epic. The family history was reported by Carolyn.    The following significant findings were reported today:   Preeti reports a family history significant for multiple miscarriages. She reports that her mother (56y) had two miscarriages and her maternal half-sister (26y) had three miscarriages. We reviewed that at least 10-15% of the recognized pregnancies end in miscarriage, with most losses occurring in the first trimester. The rate of miscarriage less than 8 weeks gestation may be even greater as some women may not recognize that they are pregnant. Around half of miscarriages that occur before 20 weeks gestation are caused by chromosome abnormalities.For couples who have experienced three or more unexplained pregnancy losses, it has been estimated that around 2-5% of these couples have this history related to a chromosome in one of the partners. This could be revisited in the event that Carolyn has any miscarriages.  Alden (42y) is healthy. He has four healthy daughters (17y, 16y, 15y, 8y) outside of the couple's children.  Alden's " father  at 62-years-old last December. He had congestive heart failure. This can be shared with Alden's primary doctor to best manage his cardiac health.    Otherwise, the reported family history is unremarkable for multiple miscarriages, stillbirths, birth defects, intellectual disabilities, known genetic conditions, cancer <50y, and consanguinity.       RISK ASSESSMENT FOR INHERITED CONDITIONS AND CARRIER SCREENING OPTIONS   Expanded carrier screening is available to screen for autosomal recessive conditions and X-linked conditions in a large list of genes. Carrier screening does not test the pregnancy but gives a risk assessment for the pregnancy and future pregnancies to have the condition. Expanded carrier screening is designed to identify carrier status for conditions that are primarily childhood or adolescent onset. Expanded carrier screening does not evaluate for adult-onset conditions such as hereditary cancer syndromes, dementia/ Alzheimer's disease, or cardiovascular disease risk factors. Additionally, expanded carrier screening is not comprehensive for all known genetic diseases or inherited conditions. Carrier screening does not test for all genetic and health conditions or risk factors.     Autosomal recessive conditions happen when a mutation has been inherited from the egg and sperm and include conditions like cystic fibrosis, thalassemia, hearing loss, spinal muscular atrophy, and more. We reviewed that when both biological parents carry a harmful genetic change in a gene associated with autosomal recessive inheritance, each of their pregnancies has a 1 in 4 (25%) chance to be affected by that condition. X-linked conditions happen when a mutation has been inherited from the egg and include conditions like fragile X syndrome.With x-linked conditions, the specific risk generally depends on the chromosomal sex of the fetus, with XY individuals (generally male) being most severely affected.       screening was not reviewed due to focus on other topics.  About [place] Screening    The patient does NOT have a family history of known inherited conditions. This does NOT mean the patient and/or their partner is not a carrier of a condition. Approximately 90% of couples at an increased reproductive risk for an inherited condition have no family history of that condition.     The patient nor their partner have had carrier screening previously.     The patient declined the carrier screening options and declined a thorough discussion of the options. They are aware the option will remain, and they can contact us if they would like to pursue screening.    RISK ASSESSMENT FOR CHROMOSOME CONDITIONS   We explained that the risk for fetal chromosome abnormalities increases with maternal age. We discussed specific features of common chromosome abnormalities, including trisomy 21 (Down syndrome), trisomy 13, trisomy 18, and sex chromosome trisomies.    At age 34 at midtrimester, the risk to have a baby with Down syndrome is 1 in 274.   At age 34 at midtrimester, the risk to have a baby with any chromosome abnormality is 1 in 135.     Carolyn had genetic screening earlier in this pregnancy. Their non-invasive prenatal test was screen negative or low risk for screened conditions     Non-invasive prenatal testing (NIPT) results  Maternal plasma cell-free DNA testing  Screens for fetal trisomy 21, trisomy 13, trisomy 18, and sex chromosome aneuploidy  First trimester ultrasound with nuchal translucency and nasal bone assessment was not performed in this pregnancy, to our knowledge.  Carolyn had a [facility] [employer] [facility] test earlier in pregnancy; we reviewed the results today, which are low risk.  The NIPT did include sex chromosome aneuploidies and the result was low risk. The predicted sex is XX, which is typically female.  Given the accuracy of this test, these results greatly decrease the chance for certain fetal  chromosome abnormalities  We discussed the limitations of normal NIPT results  Maternal serum AFP only to screen for open neural tube defects (after 15 weeks) was not performed in this pregnancy, to our knowledge.     GENETIC TESTING OPTIONS   Genetic testing during a pregnancy includes screening and diagnostic procedures.      Screening tests are non-invasive which means no risk to the pregnancy and includes ultrasounds and blood work. The benefits and limitations of screening were reviewed. Screening tests provide a risk assessment (chance) specific to the pregnancy for certain fetal chromosome abnormalities but cannot definitively diagnose or exclude a fetal chromosome abnormality. Follow-up genetic counseling and consideration of diagnostic testing is recommended with any abnormal screening result. Diagnostic testing during a pregnancy is more certain and can test for more conditions. However, the tests do have a risk of miscarriage that requires careful consideration. These tests can detect fetal chromosome abnormalities with greater than 99% certainty. Results can be compromised by maternal cell contamination or mosaicism and are limited by the resolution of current genetic testing technology.     There is no screening or diagnostic test that detects all forms of birth defects or intellectual disability.     We discussed the following screening options:     Non-invasive prenatal testing (NIPT)  Also called cell-free DNA screening because it detects chromosomes from the placenta in the pregnant person's blood  Can be done any time after 10 weeks gestation  Standard recommendation for NIPT screens for trisomy 21, trisomy 18, trisomy 13, with the option of adding sex chromosome aneuploidies, without or without predicted sex  Cannot screen for open neural tube defects, maternal serum AFP after 15 weeks is recommended  New NIPT options include screening for other trisomies, microdeletion syndromes, and in some  cases fetal blood antigens. Guidelines do not recommend these conditions are included in standard screening. These options have limitations and should be discussed with a genetic counselor.   However, current (2023) ACMG guidelines do recommend that screening for one microdeletion syndrome, called 22q11.2 deletion syndrome be offered to all pregnant patients. 22q11.2 deletion syndrome has an estimated prevalence of 1 in 990 to 1 in 2148 (0.05-0.1%). Risk is not thought to increase with maternal age. Clinical features are variable but include congenital heart defects, cleft palate, developmental delays, immune system deficiencies, and hearing loss. Approximately 90% of cases are de shanel (a sporadic new change in a pregnancy). Cell-free DNA screening for 22q11.2 deletion syndrome is available with the inclusion of other microdeletion syndromes. There is less data about the performance of cell-free DNA screening for more rare microdeletions and the chance for false positives or negative may be increased.  There are limitations of cell-free DNA screening in detecting microdeletions and the possiblity of false positives and false negatives.  22q11.2 deletion syndrome screening was reported in Carolyn's NIPT and was low-risk    We discussed the following ultrasound options:    Comprehensive level II ultrasound (Fetal Anatomy Ultrasound)  Ultrasound done between 18-20 weeks gestation  Screens for major birth defects and markers for aneuploidy (like trisomy 21 and trisomy 18)  Includes looking at the fetus/baby's growth, heart, organs (stomach, kidneys), placenta, and amniotic fluid    We discussed the following diagnostic options:     Amniocentesis  Invasive diagnostic procedure done after 15 weeks gestation  The procedure collects a small sample of amniotic fluid for the purpose of chromosomal testing and/or other genetic testing  Diagnostic result; more than 99% sensitivity for fetal chromosome abnormalities  Testing  for AFP in the amniotic fluid can test for open neural tube defects    It was a pleasure to be involved with Carolyn s care. Face-to-face time of the meeting was  25  minutes.    Silvia Gonzalez GC, MS, [facility]  Board Certified and [place] Licensed Genetic Counselor  [facility]  Maternal Fetal Medicine  Office: [phone]  [facility]: [phone]   Fax: [phone]  [facility]

## 2024-06-04 NOTE — NURSING NOTE
"Chief Complaint   Patient presents with    Prenatal Care     26 weeks 3 days- light headed and dizzy        Initial /58 (BP Location: Right arm, Patient Position: Sitting, Cuff Size: Adult Regular)   Wt 81.6 kg (180 lb)   LMP 2023 (Approximate)   BMI 30.90 kg/m   Estimated body mass index is 30.9 kg/m  as calculated from the following:    Height as of 19: 1.626 m (5' 4\").    Weight as of this encounter: 81.6 kg (180 lb).  BP completed using cuff size: regular    Questioned patient about current smoking habits.  Pt. has never smoked.          The following HM Due: NONE    26w3d  Guido Acuna CMA                "

## 2024-06-04 NOTE — PROGRESS NOTES
"Please see \"Imaging\" tab under \"Chart Review\" for details of today's visit.    Madison Espinal    "

## 2024-06-04 NOTE — NURSING NOTE
Patient presents to [facility] for L2 at 26w3d due to AMA. Positive fetal movement. Denies LOF, vaginal bleeding. Pt reports some cramping in the evening. She has a new ob appt with [facility] today. Educated pt to let her OB provider know about her cramping. Pt verbalized understanding. SBAR given to RAJANI MD, see their note in Epic.

## 2024-06-05 DIAGNOSIS — O99.810 ABNORMAL MATERNAL GLUCOSE TOLERANCE, ANTEPARTUM: Primary | ICD-10-CM

## 2024-06-05 LAB
C TRACH DNA SPEC QL NAA+PROBE: NEGATIVE
N GONORRHOEA DNA SPEC QL NAA+PROBE: NEGATIVE

## 2024-06-14 ENCOUNTER — LAB (OUTPATIENT)
Dept: LAB | Facility: CLINIC | Age: 34
End: 2024-06-14
Attending: OBSTETRICS & GYNECOLOGY
Payer: COMMERCIAL

## 2024-06-14 DIAGNOSIS — O99.810 ABNORMAL MATERNAL GLUCOSE TOLERANCE, ANTEPARTUM: ICD-10-CM

## 2024-06-14 PROCEDURE — 82951 GLUCOSE TOLERANCE TEST (GTT): CPT

## 2024-06-14 PROCEDURE — 82952 GTT-ADDED SAMPLES: CPT

## 2024-06-14 PROCEDURE — 36415 COLL VENOUS BLD VENIPUNCTURE: CPT

## 2024-06-15 ENCOUNTER — HEALTH MAINTENANCE LETTER (OUTPATIENT)
Age: 34
End: 2024-06-15

## 2024-06-15 LAB
GESTATIONAL GTT 1 HR POST DOSE: 130 MG/DL (ref 60–179)
GESTATIONAL GTT 2 HR POST DOSE: 113 MG/DL (ref 60–154)
GESTATIONAL GTT 3 HR POST DOSE: 63 MG/DL (ref 60–139)
GLUCOSE P FAST SERPL-MCNC: 76 MG/DL (ref 60–94)

## 2024-06-25 ENCOUNTER — HOSPITAL ENCOUNTER (OUTPATIENT)
Dept: ULTRASOUND IMAGING | Facility: CLINIC | Age: 34
Discharge: HOME | End: 2024-06-25
Attending: STUDENT IN AN ORGANIZED HEALTH CARE EDUCATION/TRAINING PROGRAM
Payer: COMMERCIAL

## 2024-06-25 ENCOUNTER — OFFICE VISIT (OUTPATIENT)
Dept: MATERNAL FETAL MEDICINE | Facility: CLINIC | Age: 34
End: 2024-06-25
Attending: STUDENT IN AN ORGANIZED HEALTH CARE EDUCATION/TRAINING PROGRAM
Payer: COMMERCIAL

## 2024-06-25 DIAGNOSIS — Z36.2 ENCOUNTER FOR FOLLOW-UP ULTRASOUND OF FETAL ANATOMY: Primary | ICD-10-CM

## 2024-06-25 DIAGNOSIS — Z36.2 ENCOUNTER FOR FOLLOW-UP ULTRASOUND OF FETAL ANATOMY: ICD-10-CM

## 2024-06-25 PROCEDURE — 76816 OB US FOLLOW-UP PER FETUS: CPT | Mod: 26 | Performed by: OBSTETRICS & GYNECOLOGY

## 2024-06-25 PROCEDURE — 76816 OB US FOLLOW-UP PER FETUS: CPT

## 2024-06-25 NOTE — PROGRESS NOTES
"Please see \"Imaging\" tab under \"Chart Review\" for details of today's US at the [facility].    Geovani Alarcon MD  Maternal-Fetal Medicine    "

## 2024-06-25 NOTE — NURSING NOTE
Patient reports good fetal movement, denies contractions, leaking of fluid, or bleeding.  SBAR given to RAJANI LAGUNA, see their note in Epic.

## 2024-07-16 ENCOUNTER — PRENATAL OFFICE VISIT (OUTPATIENT)
Dept: OBGYN | Facility: CLINIC | Age: 34
End: 2024-07-16
Payer: COMMERCIAL

## 2024-07-16 VITALS — SYSTOLIC BLOOD PRESSURE: 106 MMHG | DIASTOLIC BLOOD PRESSURE: 56 MMHG | BODY MASS INDEX: 32.79 KG/M2 | WEIGHT: 191 LBS

## 2024-07-16 DIAGNOSIS — O34.219 PREVIOUS CESAREAN DELIVERY, ANTEPARTUM CONDITION OR COMPLICATION: ICD-10-CM

## 2024-07-16 DIAGNOSIS — Z23 NEED FOR TDAP VACCINATION: Primary | ICD-10-CM

## 2024-07-16 DIAGNOSIS — O09.522 MULTIGRAVIDA OF ADVANCED MATERNAL AGE IN SECOND TRIMESTER: ICD-10-CM

## 2024-07-16 PROCEDURE — 90715 TDAP VACCINE 7 YRS/> IM: CPT | Performed by: OBSTETRICS & GYNECOLOGY

## 2024-07-16 PROCEDURE — 90471 IMMUNIZATION ADMIN: CPT | Performed by: OBSTETRICS & GYNECOLOGY

## 2024-07-16 PROCEDURE — 99212 OFFICE O/P EST SF 10 MIN: CPT | Mod: 25 | Performed by: OBSTETRICS & GYNECOLOGY

## 2024-07-16 NOTE — PROGRESS NOTES
Routine obstetric visit at 32w3d  Considering TOLAC, good candidate, reviewed. Will probably plan this but not ready to sign consent today.  Follow up in 2 weeks.    Lissette Langston MD  [facility] Obstetrics and Gynecology

## 2024-08-19 ENCOUNTER — PRENATAL OFFICE VISIT (OUTPATIENT)
Dept: OBGYN | Facility: CLINIC | Age: 34
End: 2024-08-19
Payer: COMMERCIAL

## 2024-08-19 VITALS — WEIGHT: 196.8 LBS | DIASTOLIC BLOOD PRESSURE: 64 MMHG | SYSTOLIC BLOOD PRESSURE: 112 MMHG | BODY MASS INDEX: 33.78 KG/M2

## 2024-08-19 DIAGNOSIS — O09.523 MULTIGRAVIDA OF ADVANCED MATERNAL AGE IN THIRD TRIMESTER: Primary | ICD-10-CM

## 2024-08-19 PROCEDURE — 99212 OFFICE O/P EST SF 10 MIN: CPT | Performed by: FAMILY MEDICINE

## 2024-08-19 PROCEDURE — 87653 STREP B DNA AMP PROBE: CPT | Performed by: FAMILY MEDICINE

## 2024-08-19 NOTE — PROGRESS NOTES
CC: Here for routine prenatal visit   34 year old y/o  @ 37w2d with Estimated Date of Delivery: Sep 7, 2024     /64   Wt 89.3 kg (196 lb 12.8 oz)   LMP 2023   BMI 33.78 kg/m    See OB flowsheet  + fetal movement, no contractions, no bleeding, no loss of fluid   Discussed monitoring fetal movement     1) concerns: feeling exhausted.  Requesting membrane sweep   2) Routine: Blood type O+ RI tdap [ ] flu [ ] GS [declined] Covid v [ ] gtt [nl] XX  3) Risk factors:   A: hx of CS, planning :  reviewed  consent, signed.    Would like IOL @ 39 week, will plan for next time to arrange   Growth us ordered today.   Hx of vag, vag, and CS for twins  .  Pelvic proven to 7# 15 oz  B: AMA: Level II us,   C:  [facility] with 1 visit only      4) Return: weekly, GBS today     Barby

## 2024-08-19 NOTE — PATIENT INSTRUCTIONS
"Return weekly   Return to clinic:  every 4 weeks till 28 weeks, then every 2 weeks till 36 weeks, then weekly till delivery  Please schedule out a few ob visits at a time so that you can get an appointment as you would like.     For tour call [phone], tours on Thursdays @ 4:30 and 5:30 pm    Phone numbers Jitendra:  Day/ night [phone] ask for ob triage  Emergency:  Call labor and delivery:  [phone]    What should I call about??    Contraction every 5 minutes for 1 hour 1 minute long (511), bleeding, loss of fluid, headache that doesn't resolve with tylenol, and decreased fetal movement     Start kick counts @ 26-28 weeks   There is an aicha for this!  It is called \"count the kicks\"  Keep track of movement and discover your normal baby movement pattern   guideline is listed below  Please call if you do not feel the baby move!  We will have you come in for fetal heart rate monitoring:   Perception of at least 10 FMs during 12 hours of normal maternal activity   Perception of least 10 FMs over two hours when the mother is at rest and focused on [facility] [place] [facility]   [address] [facility], [place], [place] [address] [phone]    Dr. Vicky iDor, DO    OB/GYN   [facility]                                                    "

## 2024-08-19 NOTE — NURSING NOTE
"37w2d    Chief Complaint   Patient presents with    Prenatal Care     GBS due       Initial /64   Wt 89.3 kg (196 lb 12.8 oz)   LMP 2023   BMI 33.78 kg/m   Estimated body mass index is 33.78 kg/m  as calculated from the following:    Height as of 19: 1.626 m (5' 4\").    Weight as of this encounter: 89.3 kg (196 lb 12.8 oz).  BP completed using cuff size: regular    Questioned patient about current smoking habits.  Pt. quit smoking some time ago.          The following  Due: NONE  "

## 2024-08-20 ENCOUNTER — ANCILLARY PROCEDURE (OUTPATIENT)
Dept: ULTRASOUND IMAGING | Facility: CLINIC | Age: 34
End: 2024-08-20
Attending: FAMILY MEDICINE
Payer: COMMERCIAL

## 2024-08-20 DIAGNOSIS — O09.523 MULTIGRAVIDA OF ADVANCED MATERNAL AGE IN THIRD TRIMESTER: ICD-10-CM

## 2024-08-20 LAB — GP B STREP DNA SPEC QL NAA+PROBE: NEGATIVE

## 2024-08-20 PROCEDURE — 76816 OB US FOLLOW-UP PER FETUS: CPT | Performed by: OBSTETRICS & GYNECOLOGY

## 2024-08-26 ENCOUNTER — TELEPHONE (OUTPATIENT)
Dept: OBGYN | Facility: CLINIC | Age: 34
End: 2024-08-26

## 2024-08-26 ENCOUNTER — PRENATAL OFFICE VISIT (OUTPATIENT)
Dept: OBGYN | Facility: CLINIC | Age: 34
End: 2024-08-26
Payer: COMMERCIAL

## 2024-08-26 VITALS
BODY MASS INDEX: 33.97 KG/M2 | DIASTOLIC BLOOD PRESSURE: 70 MMHG | SYSTOLIC BLOOD PRESSURE: 118 MMHG | WEIGHT: 199 LBS | HEIGHT: 64 IN

## 2024-08-26 DIAGNOSIS — Z34.80 SUPERVISION OF OTHER NORMAL PREGNANCY, ANTEPARTUM: Primary | ICD-10-CM

## 2024-08-26 PROCEDURE — 99212 OFFICE O/P EST SF 10 MIN: CPT | Performed by: OBSTETRICS & GYNECOLOGY

## 2024-08-26 NOTE — PROGRESS NOTES
34 year old  at 38w2d     O+/RI.  NIPT nl.  H/o , , CS (twins), desires TOLAC.  TOLAC consent signed again today.  Pelvis proven 8 lb.  Membranes swept  Desires IOL at 39 +0 (scheduled  at 0730)    RTC 6w PP     Eleonora Berkowitz MD, MPH  [facility] OB/Gyn

## 2024-08-26 NOTE — TELEPHONE ENCOUNTER
Spoke with the pt.   38w2d    She was wondering if the time of her induction could be changed to 7 pm.   I advised that inductions are only done in am.    She is okay with leaving it as it.    Yenni CABA RN  [facility]

## 2024-08-31 ENCOUNTER — HOSPITAL ENCOUNTER (INPATIENT)
Facility: CLINIC | Age: 34
LOS: 2 days | Discharge: HOME | End: 2024-09-02
Attending: FAMILY MEDICINE | Admitting: FAMILY MEDICINE
Payer: COMMERCIAL

## 2024-08-31 ENCOUNTER — ANESTHESIA EVENT (OUTPATIENT)
Dept: OBGYN | Facility: CLINIC | Age: 34
End: 2024-08-31
Payer: COMMERCIAL

## 2024-08-31 ENCOUNTER — ANESTHESIA (OUTPATIENT)
Dept: OBGYN | Facility: CLINIC | Age: 34
End: 2024-08-31
Payer: COMMERCIAL

## 2024-08-31 LAB
ABO/RH(D): NORMAL
ANTIBODY SCREEN: NEGATIVE
HGB BLD-MCNC: 9.7 G/DL (ref 11.7–15.7)
SPECIMEN EXPIRATION DATE: NORMAL
T PALLIDUM AB SER QL: NONREACTIVE

## 2024-08-31 PROCEDURE — 250N000011 HC RX IP 250 OP 636: Performed by: FAMILY MEDICINE

## 2024-08-31 PROCEDURE — 85018 HEMOGLOBIN: CPT | Performed by: FAMILY MEDICINE

## 2024-08-31 PROCEDURE — 250N000009 HC RX 250: Performed by: FAMILY MEDICINE

## 2024-08-31 PROCEDURE — 00HU33Z INSERTION OF INFUSION DEVICE INTO SPINAL CANAL, PERCUTANEOUS APPROACH: ICD-10-PCS | Performed by: ANESTHESIOLOGY

## 2024-08-31 PROCEDURE — 86780 TREPONEMA PALLIDUM: CPT | Performed by: FAMILY MEDICINE

## 2024-08-31 PROCEDURE — 0HQ9XZZ REPAIR PERINEUM SKIN, EXTERNAL APPROACH: ICD-10-PCS | Performed by: FAMILY MEDICINE

## 2024-08-31 PROCEDURE — 722N000001 HC LABOR CARE VAGINAL DELIVERY SINGLE

## 2024-08-31 PROCEDURE — 370N000003 HC ANESTHESIA WARD SERVICE: Performed by: ANESTHESIOLOGY

## 2024-08-31 PROCEDURE — 250N000013 HC RX MED GY IP 250 OP 250 PS 637: Performed by: FAMILY MEDICINE

## 2024-08-31 PROCEDURE — 10907ZC DRAINAGE OF AMNIOTIC FLUID, THERAPEUTIC FROM PRODUCTS OF CONCEPTION, VIA NATURAL OR ARTIFICIAL OPENING: ICD-10-PCS | Performed by: FAMILY MEDICINE

## 2024-08-31 PROCEDURE — 86900 BLOOD TYPING SEROLOGIC ABO: CPT | Performed by: FAMILY MEDICINE

## 2024-08-31 PROCEDURE — 250N000011 HC RX IP 250 OP 636: Performed by: ANESTHESIOLOGY

## 2024-08-31 PROCEDURE — 258N000003 HC RX IP 258 OP 636: Performed by: FAMILY MEDICINE

## 2024-08-31 PROCEDURE — 3E0R3BZ INTRODUCTION OF ANESTHETIC AGENT INTO SPINAL CANAL, PERCUTANEOUS APPROACH: ICD-10-PCS | Performed by: ANESTHESIOLOGY

## 2024-08-31 PROCEDURE — 3E033VJ INTRODUCTION OF OTHER HORMONE INTO PERIPHERAL VEIN, PERCUTANEOUS APPROACH: ICD-10-PCS | Performed by: FAMILY MEDICINE

## 2024-08-31 PROCEDURE — 120N000001 HC R&B MED SURG/OB

## 2024-08-31 RX ORDER — MISOPROSTOL 200 UG/1
400 TABLET ORAL
Status: DISCONTINUED | OUTPATIENT
Start: 2024-08-31 | End: 2024-09-01 | Stop reason: HOSPADM

## 2024-08-31 RX ORDER — ACETAMINOPHEN 325 MG/1
650 TABLET ORAL ONCE
Status: COMPLETED | OUTPATIENT
Start: 2024-08-31 | End: 2024-08-31

## 2024-08-31 RX ORDER — OXYTOCIN 10 [USP'U]/ML
10 INJECTION, SOLUTION INTRAMUSCULAR; INTRAVENOUS
Status: DISCONTINUED | OUTPATIENT
Start: 2024-08-31 | End: 2024-09-01 | Stop reason: HOSPADM

## 2024-08-31 RX ORDER — LOPERAMIDE HCL 2 MG
2 CAPSULE ORAL
Status: DISCONTINUED | OUTPATIENT
Start: 2024-08-31 | End: 2024-09-01 | Stop reason: HOSPADM

## 2024-08-31 RX ORDER — BUPIVACAINE HYDROCHLORIDE 2.5 MG/ML
10 INJECTION, SOLUTION EPIDURAL; INFILTRATION; INTRACAUDAL ONCE
Status: DISCONTINUED | OUTPATIENT
Start: 2024-08-31 | End: 2024-09-01 | Stop reason: HOSPADM

## 2024-08-31 RX ORDER — LIDOCAINE HYDROCHLORIDE AND EPINEPHRINE 15; 5 MG/ML; UG/ML
3 INJECTION, SOLUTION EPIDURAL
Status: DISCONTINUED | OUTPATIENT
Start: 2024-08-31 | End: 2024-09-01 | Stop reason: HOSPADM

## 2024-08-31 RX ORDER — PROCHLORPERAZINE MALEATE 10 MG
10 TABLET ORAL EVERY 6 HOURS PRN
Status: DISCONTINUED | OUTPATIENT
Start: 2024-08-31 | End: 2024-09-01 | Stop reason: HOSPADM

## 2024-08-31 RX ORDER — KETOROLAC TROMETHAMINE 30 MG/ML
30 INJECTION, SOLUTION INTRAMUSCULAR; INTRAVENOUS
Status: COMPLETED | OUTPATIENT
Start: 2024-08-31 | End: 2024-09-01

## 2024-08-31 RX ORDER — METOCLOPRAMIDE HYDROCHLORIDE 5 MG/ML
10 INJECTION INTRAMUSCULAR; INTRAVENOUS EVERY 6 HOURS PRN
Status: DISCONTINUED | OUTPATIENT
Start: 2024-08-31 | End: 2024-09-01 | Stop reason: HOSPADM

## 2024-08-31 RX ORDER — ONDANSETRON 2 MG/ML
4 INJECTION INTRAMUSCULAR; INTRAVENOUS EVERY 6 HOURS PRN
Status: DISCONTINUED | OUTPATIENT
Start: 2024-08-31 | End: 2024-09-01 | Stop reason: HOSPADM

## 2024-08-31 RX ORDER — CARBOPROST TROMETHAMINE 250 UG/ML
250 INJECTION, SOLUTION INTRAMUSCULAR
Status: DISCONTINUED | OUTPATIENT
Start: 2024-08-31 | End: 2024-09-01 | Stop reason: HOSPADM

## 2024-08-31 RX ORDER — PROCHLORPERAZINE 25 MG
25 SUPPOSITORY, RECTAL RECTAL EVERY 12 HOURS PRN
Status: DISCONTINUED | OUTPATIENT
Start: 2024-08-31 | End: 2024-09-01 | Stop reason: HOSPADM

## 2024-08-31 RX ORDER — ONDANSETRON 4 MG/1
4 TABLET, ORALLY DISINTEGRATING ORAL EVERY 6 HOURS PRN
Status: DISCONTINUED | OUTPATIENT
Start: 2024-08-31 | End: 2024-08-31

## 2024-08-31 RX ORDER — LOPERAMIDE HCL 2 MG
4 CAPSULE ORAL
Status: DISCONTINUED | OUTPATIENT
Start: 2024-08-31 | End: 2024-09-01 | Stop reason: HOSPADM

## 2024-08-31 RX ORDER — OXYTOCIN/0.9 % SODIUM CHLORIDE 30/500 ML
100-340 PLASTIC BAG, INJECTION (ML) INTRAVENOUS CONTINUOUS PRN
Status: DISCONTINUED | OUTPATIENT
Start: 2024-08-31 | End: 2024-09-01

## 2024-08-31 RX ORDER — ONDANSETRON 2 MG/ML
4 INJECTION INTRAMUSCULAR; INTRAVENOUS EVERY 6 HOURS PRN
Status: DISCONTINUED | OUTPATIENT
Start: 2024-08-31 | End: 2024-08-31

## 2024-08-31 RX ORDER — OXYTOCIN/0.9 % SODIUM CHLORIDE 30/500 ML
1-24 PLASTIC BAG, INJECTION (ML) INTRAVENOUS CONTINUOUS
Status: DISCONTINUED | OUTPATIENT
Start: 2024-08-31 | End: 2024-09-01 | Stop reason: HOSPADM

## 2024-08-31 RX ORDER — CITRIC ACID/SODIUM CITRATE 334-500MG
30 SOLUTION, ORAL ORAL
Status: DISCONTINUED | OUTPATIENT
Start: 2024-08-31 | End: 2024-09-01 | Stop reason: HOSPADM

## 2024-08-31 RX ORDER — MISOPROSTOL 200 UG/1
800 TABLET ORAL
Status: DISCONTINUED | OUTPATIENT
Start: 2024-08-31 | End: 2024-09-01 | Stop reason: HOSPADM

## 2024-08-31 RX ORDER — METOCLOPRAMIDE 10 MG/1
10 TABLET ORAL EVERY 6 HOURS PRN
Status: DISCONTINUED | OUTPATIENT
Start: 2024-08-31 | End: 2024-09-01 | Stop reason: HOSPADM

## 2024-08-31 RX ORDER — NALOXONE HYDROCHLORIDE 0.4 MG/ML
0.4 INJECTION, SOLUTION INTRAMUSCULAR; INTRAVENOUS; SUBCUTANEOUS
Status: DISCONTINUED | OUTPATIENT
Start: 2024-08-31 | End: 2024-09-01 | Stop reason: HOSPADM

## 2024-08-31 RX ORDER — FENTANYL CITRATE 50 UG/ML
100 INJECTION, SOLUTION INTRAMUSCULAR; INTRAVENOUS
Status: DISCONTINUED | OUTPATIENT
Start: 2024-08-31 | End: 2024-09-01 | Stop reason: HOSPADM

## 2024-08-31 RX ORDER — NALBUPHINE HYDROCHLORIDE 10 MG/ML
2.5-5 INJECTION, SOLUTION INTRAMUSCULAR; INTRAVENOUS; SUBCUTANEOUS EVERY 6 HOURS PRN
Status: DISCONTINUED | OUTPATIENT
Start: 2024-08-31 | End: 2024-09-01

## 2024-08-31 RX ORDER — METHYLERGONOVINE MALEATE 0.2 MG/ML
200 INJECTION INTRAVENOUS
Status: DISCONTINUED | OUTPATIENT
Start: 2024-08-31 | End: 2024-09-01 | Stop reason: HOSPADM

## 2024-08-31 RX ORDER — IBUPROFEN 800 MG/1
800 TABLET, FILM COATED ORAL
Status: COMPLETED | OUTPATIENT
Start: 2024-08-31 | End: 2024-09-01

## 2024-08-31 RX ORDER — SODIUM CHLORIDE, SODIUM LACTATE, POTASSIUM CHLORIDE, CALCIUM CHLORIDE 600; 310; 30; 20 MG/100ML; MG/100ML; MG/100ML; MG/100ML
INJECTION, SOLUTION INTRAVENOUS CONTINUOUS
Status: DISCONTINUED | OUTPATIENT
Start: 2024-08-31 | End: 2024-09-01 | Stop reason: HOSPADM

## 2024-08-31 RX ORDER — ONDANSETRON 4 MG/1
4 TABLET, ORALLY DISINTEGRATING ORAL EVERY 6 HOURS PRN
Status: DISCONTINUED | OUTPATIENT
Start: 2024-08-31 | End: 2024-09-01 | Stop reason: HOSPADM

## 2024-08-31 RX ORDER — FENTANYL CITRATE-0.9 % NACL/PF 10 MCG/ML
100 PLASTIC BAG, INJECTION (ML) INTRAVENOUS EVERY 5 MIN PRN
Status: COMPLETED | OUTPATIENT
Start: 2024-08-31 | End: 2024-08-31

## 2024-08-31 RX ORDER — OXYTOCIN/0.9 % SODIUM CHLORIDE 30/500 ML
340 PLASTIC BAG, INJECTION (ML) INTRAVENOUS CONTINUOUS PRN
Status: DISCONTINUED | OUTPATIENT
Start: 2024-08-31 | End: 2024-09-01 | Stop reason: HOSPADM

## 2024-08-31 RX ORDER — NALOXONE HYDROCHLORIDE 0.4 MG/ML
0.2 INJECTION, SOLUTION INTRAMUSCULAR; INTRAVENOUS; SUBCUTANEOUS
Status: DISCONTINUED | OUTPATIENT
Start: 2024-08-31 | End: 2024-09-01 | Stop reason: HOSPADM

## 2024-08-31 RX ORDER — TRANEXAMIC ACID 10 MG/ML
1 INJECTION, SOLUTION INTRAVENOUS EVERY 30 MIN PRN
Status: DISCONTINUED | OUTPATIENT
Start: 2024-08-31 | End: 2024-09-01 | Stop reason: HOSPADM

## 2024-08-31 RX ORDER — OXYTOCIN 10 [USP'U]/ML
10 INJECTION, SOLUTION INTRAMUSCULAR; INTRAVENOUS
Status: DISCONTINUED | OUTPATIENT
Start: 2024-08-31 | End: 2024-09-01

## 2024-08-31 RX ORDER — BUPIVACAINE HYDROCHLORIDE 2.5 MG/ML
INJECTION, SOLUTION EPIDURAL; INFILTRATION; INTRACAUDAL
Status: COMPLETED | OUTPATIENT
Start: 2024-08-31 | End: 2024-08-31

## 2024-08-31 RX ADMIN — SODIUM CHLORIDE, POTASSIUM CHLORIDE, SODIUM LACTATE AND CALCIUM CHLORIDE: 600; 310; 30; 20 INJECTION, SOLUTION INTRAVENOUS at 21:25

## 2024-08-31 RX ADMIN — FENTANYL CITRATE 100 MCG: 50 INJECTION, SOLUTION INTRAMUSCULAR; INTRAVENOUS at 20:08

## 2024-08-31 RX ADMIN — SODIUM CHLORIDE, POTASSIUM CHLORIDE, SODIUM LACTATE AND CALCIUM CHLORIDE: 600; 310; 30; 20 INJECTION, SOLUTION INTRAVENOUS at 17:47

## 2024-08-31 RX ADMIN — Medication 2 MILLI-UNITS/MIN: at 11:07

## 2024-08-31 RX ADMIN — Medication 100 MCG: at 21:35

## 2024-08-31 RX ADMIN — Medication 100 MCG: at 21:30

## 2024-08-31 RX ADMIN — Medication 100 MCG: at 21:40

## 2024-08-31 RX ADMIN — SODIUM CHLORIDE, POTASSIUM CHLORIDE, SODIUM LACTATE AND CALCIUM CHLORIDE: 600; 310; 30; 20 INJECTION, SOLUTION INTRAVENOUS at 10:45

## 2024-08-31 RX ADMIN — Medication 100 MCG: at 21:49

## 2024-08-31 RX ADMIN — BUPIVACAINE HYDROCHLORIDE 10 ML: 2.5 INJECTION, SOLUTION EPIDURAL; INFILTRATION; INTRACAUDAL at 21:10

## 2024-08-31 RX ADMIN — ONDANSETRON 4 MG: 2 INJECTION INTRAMUSCULAR; INTRAVENOUS at 18:36

## 2024-08-31 RX ADMIN — Medication: at 21:15

## 2024-08-31 RX ADMIN — ACETAMINOPHEN 650 MG: 325 TABLET, FILM COATED ORAL at 17:47

## 2024-08-31 RX ADMIN — FENTANYL CITRATE 100 MCG: 50 INJECTION, SOLUTION INTRAMUSCULAR; INTRAVENOUS at 19:05

## 2024-08-31 ASSESSMENT — ACTIVITIES OF DAILY LIVING (ADL)
ADLS_ACUITY_SCORE: 20

## 2024-08-31 NOTE — PROVIDER NOTIFICATION
08/31/24 1815   Provider Notification   Provider Name/Title Dr. Dior   Method of Notification In Department     Dr. Dior updated in department that pt is now rating her ctx's 8/10. She is breathing well through contractions and states she is coping. On 16 of Pit with ctx's q3-4 minutes. FHT's with minimal to moderate variability, occ accels, early decels.     MD and in-house provider in department when/if needed.

## 2024-08-31 NOTE — PROGRESS NOTES
S:  Essence   O:/68 (BP Location: Left arm, Patient Position: Semi-Prado's, Cuff Size: Adult Regular)   Temp 97.8  F (36.6  C) (Oral)   Resp 16   LMP 2023    : 3/90/-3 posterior, aromed with clear fluid     FHT's Category 2 with baseline 125  Accelerations-present   Decelerations- present, rare Type of deceleration:  variable   Variability-moderate    RN to update MD when needed on fetal status as the rare variable is not concerning at this time.      A:  34 year old y/o  @ 39w0d  wks EGA with IOL, elective, DFM,  and GBS negative   P:  AROM, pitocin continuing,  IV started previously,   fetal status reassuring    Cat II tracing but with reassuring fetal status, continue labor      Dr. Vicky Dior, DO    OB/GYN   [facility]

## 2024-08-31 NOTE — PROVIDER NOTIFICATION
08/31/24 1845   Provider Notification   Provider Name/Title Dr. Dior   Method of Notification At Bedside     Dr. Dior at bedside. Pt in hands and knees. SVE recently 7/90/-2, posterior. Pt coping.

## 2024-08-31 NOTE — H&P
[facility] Labor and Delivery History and Physical    Carolyn Jacobs MRN# [id number]   Age: 34 year old YOB: 1990     Date of Admission:  2024    Primary care provider: Arleen Napier           Chief Complaint:   Carolyn Jacobs is a 34 year old female who is  @ 39w0d pregnant and being admitted for induction of labor, indication late prenatal care, elective, hx of  section desires to TOLAC, hx of 2 vaginal deliverys with CS (low transverse CS documented) for twin gestation @ 30 weeks gestation.  GBS negative.           Pregnancy history:     OBSTETRIC HISTORY:    OB History    Para Term  AB Living   5 3 2 1 1 4   SAB IAB Ectopic Multiple Live Births   0 1 0 1 4      # Outcome Date GA Lbr Doyle/2nd Weight Sex Type Anes PTL Lv   5 Current            4A  23 30w0d  0.81 kg (1 lb 12.6 oz) M CS-LTranv Spinal  LANDON      Complications: Fetal Intolerance      Name: DAVID JACOBS A CAROLYN      Apgar1: 8  Apgar5: 9   4B  23 30w0d  1.47 kg (3 lb 3.9 oz) M CS-LTranv Spinal  LANDON      Complications: Fetal Intolerance      Name: DAVID JACOBS HARRIET CAROLYN      Apgar1: 7  Apgar5: 8   3 Term 17 40w1d 05:45 / 00:45 3.6 kg (7 lb 15 oz) F Vag-Spont EPI N LANDON      Name: SHREE JACOBS1 CAROLYN      Apgar1: 8  Apgar5: 9   2 Term 09/10/14 39w0d 03:55 / 04:40 3.46 kg (7 lb 10.1 oz) M Vag-Spont EPI N LANDON      Name: Ian      Apgar1: 8  Apgar5: 9   1 IAB                EDC: Estimated Date of Delivery: Sep 7, 2024    Prenatal Labs:   Lab Results   Component Value Date    ABO O 2017    RH  Pos 2017    AS Neg 2017    HEPBANG Nonreactive 2016    CHPCRT Negative 2024    GCPCRT Negative 2024    TREPAB Negative 2017    HGB 9.7 (L) 2024    HIV Negative 2014       GBS Status:   Lab Results   Component Value Date    GBS  2017     Negative  No GBS DNA detected, presumed negative for GBS or number of  bacteria may be   below the limit of detection of the assay.   Assay performed on incubated broth culture of specimen using [employer] real-time   PCR.         Active Problem List  Patient Active Problem List   Diagnosis    CARDIOVASCULAR SCREENING; LDL GOAL LESS THAN 160    Acetabular labrum tear    Migraine    REILLY (generalized anxiety disorder)    Severe episode of recurrent major depressive disorder, without psychotic features (H)    Attention disturbance    Exercise-induced asthma    Current smoker    Controlled substance agreement signed    Mirena IUD insertion    Previous  delivery, antepartum condition or complication    AMA (advanced maternal age) multigravida 35+    Insufficient prenatal care    Large for gestational age fetus affecting management of mother    Labor and delivery, indication for care       Medication Prior to Admission  Medications Prior to Admission   Medication Sig Dispense Refill Last Dose    albuterol (PROAIR HFA/PROVENTIL HFA/VENTOLIN HFA) 108 (90 Base) MCG/ACT inhaler Inhale 1-2 puffs into the lungs   Past Month    fluticasone (FLONASE) 50 MCG/ACT nasal spray Spray 1 spray into both nostrils daily       fluticasone-salmeterol (ADVAIR-HFA) 230-21 MCG/ACT inhaler Inhale 2 puffs into the lungs       loratadine (CLARITIN) 10 MG tablet Take 10 mg by mouth daily.       Prenatal Vit-Fe Fumarate-FA (M- PLUS) 27-1 MG TABS Take 1 tablet by mouth daily at 2 pm      .        Maternal Past Medical History:     Past Medical History:   Diagnosis Date    Abnormal Pap smear     HPV & normal     Abnormal Pap smear of cervix     Anemia     not on iron vits, O+ blood type    Anxiety     Depressive disorder     History of human papillomavirus infection     Migraines     Mirena IUD insertion 08/10/2017    Lot #  [id number] Exp. Date (last month of insertion) 2019 NDC # 92896-294-03 REMOVE BY AUG. 2022     Panic attacks     Postpartum depression     Uncomplicated asthma     Varicella      Varicosities                        Family History:   This patient has no significant family history            Social History:   This patient has no significant social history         Review of Systems:   CONSTITUTIONAL: NEGATIVE for fever, chills, change in weight  INTEGUMENTARY/SKIN: NEGATIVE for worrisome rashes, moles or lesions  EYES: NEGATIVE for vision changes or irritation  ENT/MOUTH: NEGATIVE for ear, mouth and throat problems  RESP: NEGATIVE for significant cough or SOB  BREAST: NEGATIVE for masses, tenderness or discharge  CV: NEGATIVE for chest pain, palpitations or peripheral edema  GI: NEGATIVE for nausea, abdominal pain, heartburn, or change in bowel habits  : NEGATIVE for frequency, dysuria, or hematuria  MUSCULOSKELETAL: NEGATIVE for significant arthralgias or myalgia  NEURO: NEGATIVE for weakness, dizziness or paresthesias  ENDOCRINE: NEGATIVE for temperature intolerance, skin/hair changes  HEME: NEGATIVE for bleeding problems  PSYCHIATRIC: NEGATIVE for changes in mood or affect          Physical Exam:   Vitals were reviewed  All vitals stable  Patient Vitals for the past 8 hrs:   BP Temp Temp src Resp   08/31/24 0954 119/70 97.8  F (36.6  C) Oral 20     Constitutional: Awake, alert, cooperative, no apparent distress, and appears stated age.  Eyes: Lids and lashes normal, pupils equal, round and reactive to light, extra ocular muscles intact, sclera clear, conjunctiva normal.  ENT: Normocephalic, without obvious abnormality, atramatic, sinuses nontender on palpation, external ears without lesions, oral pharynx with moist mucus membranes, tonsils without erythema or exudates, gums normal and good dentition.  Neck: Supple, symmetrical, trachea midline, no adenopathy, thyroid symmetric, not enlarged and no tenderness, skin normal.  Hematologic / Lymphatic: No cervical lymphadenopathy and no supraclavicular lymphadenopathy.  Back: Symmetric, no curvature, spinous processes are non-tender on  palpation, paraspinous muscles are non-tender on palpation, no costal vertebral tenderness.  Lungs: No increased work of breathing, good air exchange, clear to auscultation bilaterally, no crackles or wheezing.  Cardiovascular: Regular rate and rhythm, normal S1 and S2, no S3 or S4, and no murmur noted.  Chest / Breast: Breasts symmetrical, skin without lesion(s), no nipple retraction or dimpling, no nipple discharge, no masses palpated, no axillary or supraclavicular adenopathy.  Abdomen: No scars, normal bowel sounds, soft, non-distended, non-tender, no masses palpated, no hepatosplenomegally.  Genitourinary: No urethral discharge, normal external genitalia, no hernia.  Musculoskeletal: No redness, warmth, or swelling of the joints.  Full range of motion noted.  Motor strength is 5 out of 5 all extremities bilaterally.  Tone is normal.  Neurologic: Awake, alert, oriented to name, place and time.  Cranial nerves II-XII are grossly intact.  Motor is 5 out of 5 bilaterally.  Cerebellar finger to nose, heel to shin intact.  Sensory is intact.  Babinski down going, Romberg negative, and gait is normal.  Neuropsychiatric: Normal affect, mood, orientation, memory and insight.  Skin: No rashes, erythema, pallor, petechia or purpura.   Cervix:   Membranes: intact   Dilation: 1   Effacement: 70%   Station:-1   Consistency: soft   Position: Mid  Presentation:Cephalic  Fetal Heart Rate Tracing: Tier 2 (indeterminate)   Accels: one present   Decels:  intermittent, rare variables noted   Variability: moderate   Overall reassuring:  risks for fetal acidemia is low based on the above tracing.   Plan to continue to monitor and RN will update me if her category II tracing doesn't improve.   Tocometer: none                       Assessment:   Carolyn Jacobs is a 34 year old female who is  @ 39w0d pregnant and being admitted for induction of labor, indication late prenatal care, elective, hx of  section desires to  TOLAC, hx of 2 vaginal deliverys with CS (low transverse CS documented) for twin gestation @ 30 weeks gestation.  GBS negative.         Plan:   Admit - see IP orders  Labor induction with Pitocin and arom when dilated further  Planning epidural     Vicky Dior, DO

## 2024-08-31 NOTE — PROVIDER NOTIFICATION
"   24 1058   Provider Notification   Provider Name/Title Dr. Dior   Method of Notification At Bedside     Dr. Dior at bedside.  at 39w0d here for TOLAC induction. Hx two vag deliveries and then 30 week twins via C/S. GBS neg. Pt states she has felt less fetal movement \"in the last few days\". On monitor, baseline is 135 with minimal to moderate variability and an occasional accel, no decels seen. Pt denies ctx's, vaginal bldg. Pt very tearful and upset on arrival, states she feels safe and \"I'm ok\". No contractions on the monitor. SVE 1/70/-2, mid and soft with burgos score of 7. Pt undecided on pain management plan but she is aware of options.    Orders for IV Pitocin per protocol (2x2) up to 24 prn. MD reviewed strip at bedside. Pt agreeable and questions answered.  "

## 2024-08-31 NOTE — PROVIDER NOTIFICATION
08/31/24 1602   Provider Notification   Provider Name/Title Dr. Dior   Method of Notification Phone     [employer] message to Dr. Dior:    Carolyn is getting more uncomfortable on 12 of Pit. Rating her contractions 5/10 and just starting to breathe through some. States she is coping and declines medications at this time. FHT's continue to overall be reassuring, baseline 130's, moderate variability and accels present with some periods of minimal. No decels at this time.    MD aware. Page when/if needed. PEG GONZALEZN.

## 2024-08-31 NOTE — PROVIDER NOTIFICATION
"   08/31/24 1634   Provider Notification   Provider Name/Title Dr. Dior   Method of Notification Phone     [employer] message to Dr. Dior:    SVE 5.5/90/-3, soft and posterior. Now on 14 of Pit with ctx's q1-4 min palpating mild to moderate. Pt coping and is declining offered medications at this time. Pt states, \"I will tell you if I need something for pain\". FHT's min to mod variability, occ accel, no decels.    No new orders at this time.  "

## 2024-08-31 NOTE — CARE PLAN
"Data: Patient presented to [facility]: 2024  9:39 AM.  Patient admitted for induction for TOLAC. Patient is a .  Prenatal record reviewed. Pregnancy  has been complicated by hx C/S x1.  Gestational Age 39w0d. VSS. Fetal movement decreased since \"the last few days\". Patient denies uterine contractions, vaginal bleeding, abdominal pain, pelvic pressure, nausea, vomiting, headache, visual disturbances, epigastric or URQ pain, significant edema. Support person is present.   Action: Verbal consent for EFM.  Admission assessment completed. Bill of rights reviewed.  Response: Patient verbalized agreement with plan. Will contact Dr Vicky Dior with update and further orders.    "

## 2024-09-01 LAB — HGB BLD-MCNC: 9.4 G/DL (ref 11.7–15.7)

## 2024-09-01 PROCEDURE — 250N000011 HC RX IP 250 OP 636: Performed by: FAMILY MEDICINE

## 2024-09-01 PROCEDURE — 88307 TISSUE EXAM BY PATHOLOGIST: CPT | Mod: 26 | Performed by: PATHOLOGY

## 2024-09-01 PROCEDURE — 250N000013 HC RX MED GY IP 250 OP 250 PS 637: Performed by: FAMILY MEDICINE

## 2024-09-01 PROCEDURE — 120N000001 HC R&B MED SURG/OB

## 2024-09-01 PROCEDURE — 88307 TISSUE EXAM BY PATHOLOGIST: CPT | Mod: TC | Performed by: FAMILY MEDICINE

## 2024-09-01 PROCEDURE — 85018 HEMOGLOBIN: CPT | Performed by: FAMILY MEDICINE

## 2024-09-01 PROCEDURE — 36415 COLL VENOUS BLD VENIPUNCTURE: CPT | Performed by: FAMILY MEDICINE

## 2024-09-01 RX ORDER — CARBOPROST TROMETHAMINE 250 UG/ML
250 INJECTION, SOLUTION INTRAMUSCULAR
Status: DISCONTINUED | OUTPATIENT
Start: 2024-09-01 | End: 2024-09-02 | Stop reason: HOSPADM

## 2024-09-01 RX ORDER — DOCUSATE SODIUM 100 MG/1
100 CAPSULE, LIQUID FILLED ORAL DAILY
Status: DISCONTINUED | OUTPATIENT
Start: 2024-09-01 | End: 2024-09-02 | Stop reason: HOSPADM

## 2024-09-01 RX ORDER — LOPERAMIDE HCL 2 MG
4 CAPSULE ORAL
Status: DISCONTINUED | OUTPATIENT
Start: 2024-09-01 | End: 2024-09-02 | Stop reason: HOSPADM

## 2024-09-01 RX ORDER — ONDANSETRON 4 MG/1
8 TABLET, ORALLY DISINTEGRATING ORAL EVERY 8 HOURS PRN
Status: DISCONTINUED | OUTPATIENT
Start: 2024-09-01 | End: 2024-09-02 | Stop reason: HOSPADM

## 2024-09-01 RX ORDER — NALOXONE HYDROCHLORIDE 0.4 MG/ML
0.2 INJECTION, SOLUTION INTRAMUSCULAR; INTRAVENOUS; SUBCUTANEOUS
Status: DISCONTINUED | OUTPATIENT
Start: 2024-09-01 | End: 2024-09-02 | Stop reason: HOSPADM

## 2024-09-01 RX ORDER — OXYCODONE HYDROCHLORIDE 5 MG/1
5 TABLET ORAL EVERY 4 HOURS PRN
Status: DISCONTINUED | OUTPATIENT
Start: 2024-09-01 | End: 2024-09-02 | Stop reason: HOSPADM

## 2024-09-01 RX ORDER — MISOPROSTOL 200 UG/1
800 TABLET ORAL
Status: DISCONTINUED | OUTPATIENT
Start: 2024-09-01 | End: 2024-09-02 | Stop reason: HOSPADM

## 2024-09-01 RX ORDER — METHYLERGONOVINE MALEATE 0.2 MG/ML
200 INJECTION INTRAVENOUS
Status: DISCONTINUED | OUTPATIENT
Start: 2024-09-01 | End: 2024-09-02 | Stop reason: HOSPADM

## 2024-09-01 RX ORDER — IBUPROFEN 800 MG/1
800 TABLET, FILM COATED ORAL EVERY 6 HOURS PRN
Status: DISCONTINUED | OUTPATIENT
Start: 2024-09-01 | End: 2024-09-02 | Stop reason: HOSPADM

## 2024-09-01 RX ORDER — NALOXONE HYDROCHLORIDE 0.4 MG/ML
0.4 INJECTION, SOLUTION INTRAMUSCULAR; INTRAVENOUS; SUBCUTANEOUS
Status: DISCONTINUED | OUTPATIENT
Start: 2024-09-01 | End: 2024-09-02 | Stop reason: HOSPADM

## 2024-09-01 RX ORDER — ACETAMINOPHEN 325 MG/1
650 TABLET ORAL EVERY 4 HOURS PRN
Status: DISCONTINUED | OUTPATIENT
Start: 2024-09-01 | End: 2024-09-02 | Stop reason: HOSPADM

## 2024-09-01 RX ORDER — OXYTOCIN/0.9 % SODIUM CHLORIDE 30/500 ML
340 PLASTIC BAG, INJECTION (ML) INTRAVENOUS CONTINUOUS PRN
Status: DISCONTINUED | OUTPATIENT
Start: 2024-09-01 | End: 2024-09-02 | Stop reason: HOSPADM

## 2024-09-01 RX ORDER — BISACODYL 10 MG
10 SUPPOSITORY, RECTAL RECTAL DAILY PRN
Status: DISCONTINUED | OUTPATIENT
Start: 2024-09-01 | End: 2024-09-02 | Stop reason: HOSPADM

## 2024-09-01 RX ORDER — ONDANSETRON 2 MG/ML
8 INJECTION INTRAMUSCULAR; INTRAVENOUS EVERY 8 HOURS PRN
Status: DISCONTINUED | OUTPATIENT
Start: 2024-09-01 | End: 2024-09-02 | Stop reason: HOSPADM

## 2024-09-01 RX ORDER — OXYTOCIN 10 [USP'U]/ML
10 INJECTION, SOLUTION INTRAMUSCULAR; INTRAVENOUS
Status: DISCONTINUED | OUTPATIENT
Start: 2024-09-01 | End: 2024-09-02 | Stop reason: HOSPADM

## 2024-09-01 RX ORDER — TRANEXAMIC ACID 10 MG/ML
1 INJECTION, SOLUTION INTRAVENOUS EVERY 30 MIN PRN
Status: DISCONTINUED | OUTPATIENT
Start: 2024-09-01 | End: 2024-09-02 | Stop reason: HOSPADM

## 2024-09-01 RX ORDER — MODIFIED LANOLIN
OINTMENT (GRAM) TOPICAL
Status: DISCONTINUED | OUTPATIENT
Start: 2024-09-01 | End: 2024-09-02 | Stop reason: HOSPADM

## 2024-09-01 RX ORDER — LOPERAMIDE HCL 2 MG
2 CAPSULE ORAL
Status: DISCONTINUED | OUTPATIENT
Start: 2024-09-01 | End: 2024-09-02 | Stop reason: HOSPADM

## 2024-09-01 RX ORDER — MISOPROSTOL 200 UG/1
400 TABLET ORAL
Status: DISCONTINUED | OUTPATIENT
Start: 2024-09-01 | End: 2024-09-02 | Stop reason: HOSPADM

## 2024-09-01 RX ORDER — HYDROCORTISONE 25 MG/G
CREAM TOPICAL 3 TIMES DAILY PRN
Status: DISCONTINUED | OUTPATIENT
Start: 2024-09-01 | End: 2024-09-02 | Stop reason: HOSPADM

## 2024-09-01 RX ADMIN — ACETAMINOPHEN 650 MG: 325 TABLET, FILM COATED ORAL at 12:20

## 2024-09-01 RX ADMIN — OXYCODONE HYDROCHLORIDE 5 MG: 5 TABLET ORAL at 14:58

## 2024-09-01 RX ADMIN — ONDANSETRON 8 MG: 2 INJECTION INTRAMUSCULAR; INTRAVENOUS at 04:52

## 2024-09-01 RX ADMIN — ACETAMINOPHEN 650 MG: 325 TABLET, FILM COATED ORAL at 04:25

## 2024-09-01 RX ADMIN — MISOPROSTOL 800 MCG: 200 TABLET ORAL at 00:04

## 2024-09-01 RX ADMIN — OXYCODONE HYDROCHLORIDE 5 MG: 5 TABLET ORAL at 20:24

## 2024-09-01 RX ADMIN — IBUPROFEN 800 MG: 800 TABLET, FILM COATED ORAL at 18:11

## 2024-09-01 RX ADMIN — KETOROLAC TROMETHAMINE 30 MG: 30 INJECTION, SOLUTION INTRAMUSCULAR at 00:39

## 2024-09-01 RX ADMIN — DOCUSATE SODIUM 100 MG: 100 CAPSULE, LIQUID FILLED ORAL at 08:16

## 2024-09-01 RX ADMIN — ACETAMINOPHEN 650 MG: 325 TABLET, FILM COATED ORAL at 20:24

## 2024-09-01 RX ADMIN — ONDANSETRON 8 MG: 4 TABLET, ORALLY DISINTEGRATING ORAL at 21:35

## 2024-09-01 RX ADMIN — ACETAMINOPHEN 650 MG: 325 TABLET, FILM COATED ORAL at 08:16

## 2024-09-01 RX ADMIN — IBUPROFEN 800 MG: 800 TABLET, FILM COATED ORAL at 06:32

## 2024-09-01 RX ADMIN — ACETAMINOPHEN 650 MG: 325 TABLET, FILM COATED ORAL at 16:27

## 2024-09-01 RX ADMIN — OXYCODONE HYDROCHLORIDE 5 MG: 5 TABLET ORAL at 01:07

## 2024-09-01 RX ADMIN — OXYCODONE HYDROCHLORIDE 5 MG: 5 TABLET ORAL at 08:21

## 2024-09-01 RX ADMIN — IBUPROFEN 800 MG: 800 TABLET, FILM COATED ORAL at 12:20

## 2024-09-01 ASSESSMENT — ACTIVITIES OF DAILY LIVING (ADL)
ADLS_ACUITY_SCORE: 20
ADLS_ACUITY_SCORE: 25
ADLS_ACUITY_SCORE: 20
ADLS_ACUITY_SCORE: 20
ADLS_ACUITY_SCORE: 25
ADLS_ACUITY_SCORE: 20
ADLS_ACUITY_SCORE: 25
ADLS_ACUITY_SCORE: 20
ADLS_ACUITY_SCORE: 25

## 2024-09-01 NOTE — PROGRESS NOTES
Data: Carolyn Jacobs transferred to 428 via wheelchair at 0235. Baby transferred via parent's arms.  Action: Receiving unit notified of transfer: Yes. Patient and family notified of room change. Report given to CONCHITA Faustin at 0245. Belongings sent to receiving unit. Accompanied by Registered Nurse. Oriented patient to surroundings. Call light within reach. ID bands double-checked with receiving RN.  Response: Patient tolerated transfer and is stable.

## 2024-09-01 NOTE — PROVIDER NOTIFICATION
08/31/24 1928   Provider Notification   Provider Name/Title Dr. Dior   Method of Notification At Bedside   Request Evaluate in Person   Notification Reason SVE     MD at bedside, SVE unchanged at 8.5/90%/0. MD at desk for delivery, in house on standby.

## 2024-09-01 NOTE — PROVIDER NOTIFICATION
08/31/24 5896   Provider Notification   Provider Name/Title Dr. Dior   Method of Notification Electronic Page   Request Evaluate in Person   Notification Reason SVE     Dr. Dior updated on recheck SVE, anterior lip still noted, baby appears lower, requesting assessment in person to see if cervix is reducible.

## 2024-09-01 NOTE — PLAN OF CARE
"Vital signs stable. Fundus is firm and midline, scant lochia. Voiding spontaneously. Ambulating independently, denies dizziness. Reports pain is tolerable with ibuprofen, tylenol, and oxycodone. Breastfeeding every 2-3 hours, latch observed and occasionally supplementing with formula. Attentive to  cues.    Problem: Adult Inpatient Plan of Care  Goal: Plan of Care Review  Description: The Plan of Care Review/Shift note should be completed every shift.  The Outcome Evaluation is a brief statement about your assessment that the patient is improving, declining, or no change.  This information will be displayed automatically on your shift  note.  2024 by Milka Javier RN  Outcome: Progressing  Flowsheets (Taken 2024)  Plan of Care Reviewed With: patient  Overall Patient Progress: improving  2024 by Milka Javier RN  Outcome: Progressing  Goal: Patient-Specific Goal (Individualized)  Description: You can add care plan individualizations to a care plan. Examples of Individualization might be:  \"Parent requests to be called daily at 9am for status\", \"I have a hard time hearing out of my right ear\", or \"Do not touch me to wake me up as it startles  me\".  2024 by Milka Javier RN  Outcome: Progressing  2024 by Milka Javier, RN  Outcome: Progressing  Goal: Absence of Hospital-Acquired Illness or Injury  2024 by Milka Javier, RN  Outcome: Progressing  2024 by Milka Javier, RN  Outcome: Progressing  Intervention: Prevent Skin Injury  Recent Flowsheet Documentation  Taken 2024 1509 by Milka Javier, RN  Body Position: position changed independently  Taken 2024 0821 by Milka Javier, RN  Body Position: position changed independently  Goal: Optimal Comfort and Wellbeing  2024 by Milka Javier, RN  Outcome: Progressing  2024 by Milka Javier, RN  Outcome: Progressing  Intervention: Monitor Pain and Promote Comfort  Recent " Flowsheet Documentation  Taken 9/1/2024 1458 by Milka Javier RN  Pain Management Interventions: medication (see MAR)  Taken 9/1/2024 0816 by Milka Javier RN  Pain Management Interventions:   medication (see MAR)   cold applied  Intervention: Provide Person-Centered Care  Recent Flowsheet Documentation  Taken 9/1/2024 1509 by Milka Javier RN  Trust Relationship/Rapport:   care explained   choices provided   questions answered   questions encouraged   reassurance provided  Taken 9/1/2024 0821 by Milka Javier RN  Trust Relationship/Rapport:   care explained   choices provided   questions answered   questions encouraged   reassurance provided  Goal: Readiness for Transition of Care  9/1/2024 1816 by Milka Javier RN  Outcome: Progressing  9/1/2024 1426 by Milka Javier RN  Outcome: Progressing     Problem: Postpartum (Vaginal Delivery)  Goal: Successful Parent Role Transition  9/1/2024 1816 by Milka Javier RN  Outcome: Progressing  9/1/2024 1426 by Milka Javier RN  Outcome: Progressing  Goal: Hemostasis  9/1/2024 1816 by Milka Javier RN  Outcome: Progressing  9/1/2024 1426 by Milka Javier RN  Outcome: Progressing  Goal: Absence of Infection Signs and Symptoms  9/1/2024 1816 by Milka Javier RN  Outcome: Progressing  9/1/2024 1426 by Milka Javier RN  Outcome: Progressing  Goal: Anesthesia/Sedation Recovery  9/1/2024 1816 by Milka Javier RN  Outcome: Progressing  9/1/2024 1426 by Milka Javier RN  Outcome: Progressing  Goal: Optimal Pain Control and Function  9/1/2024 1816 by Milka Javier RN  Outcome: Progressing  9/1/2024 1426 by Milka Javier RN  Outcome: Progressing  Intervention: Prevent or Manage Pain  Recent Flowsheet Documentation  Taken 9/1/2024 1458 by Milka Javier RN  Pain Management Interventions: medication (see MAR)  Taken 9/1/2024 0816 by Milka Javier RN  Pain Management Interventions:   medication (see MAR)   cold applied  Goal: Effective Urinary  Elimination  9/1/2024 1816 by Milka Javier, RN  Outcome: Progressing  9/1/2024 1426 by Milka Javier, RN  Outcome: Progressing   Goal Outcome Evaluation:      Plan of Care Reviewed With: patient    Overall Patient Progress: improvingOverall Patient Progress: improving

## 2024-09-01 NOTE — L&D DELIVERY NOTE
OB Vaginal Delivery Note    Carolyn Jacobs MRN# [id number]   Age: 34 year old YOB: 1990       GA: 39w0d  GP:   Labor Complications: None   EBL:   mL  Delivery QBL: 276 mL  Delivery Type: Vaginal, Spontaneous   ROM to Delivery Time: (Delivered) Hours: 9 Minutes: 59  [place] Weight: 4.04 kg (8 lb 14.5 oz)    1 Minute 5 Minute 10 Minute   Apgar Totals: 9   9        CHINTAN POPE C;CHRISTIANO, KB     Delivery Details:  Carolyn Jacobs, a 34 year old  female delivered a viable infant with apgars of 9  and 9 . Patient was fully dilated and pushing after 0  hours 5  minutes in active labor. Delivery was via vaginal, spontaneous  to a sterile field under intravenous  anesthesia. Infant delivered in vertex  left  occiput  anterior  position. Anterior and posterior shoulders delivered without difficulty. The cord was clamped, cut twice and 3 vessels  were noted. Cord blood was obtained in routine fashion with the following disposition: lab .      Cord complications: none   Placenta delivered at 2024 12:00 AM . Placental disposition was Pathology . Fundal massage performed and fundus found to be firm.     Episiotomy: none    Perineum, vagina, cervix were inspected, and the following lacerations were noted:   Perineal lacerations: 1st                Any lacerations were repaired in the usual fashion using 3-0 monocryl.    Excellent hemostasis was noted. Needle count correct. Infant and patient in delivery room in good and stable condition.        Shikha Jacobs-Carolyn [[id number]]      Labor Event Times      Active labor onset date: 24 Onset time: 11:22 PM   Dilation complete date: 24 Complete time: 11:27 PM          Labor Length      1st Stage (hrs): 0 (min): 5   2nd Stage (hrs): 0 (min): 27   3rd Stage (hrs): 0 (min): 6          Labor Events     labor?: No  Labor Type: TOLAC (Trial of Labor After )  Predominate monitoring during 1st stage: continuous electronic  "fetal monitoring       Rupture date/time: 24 1355   Rupture type: Artificial Rupture of Membranes  Fluid color: Clear  Fluid odor: Normal     Induction: Oxytocin, AROM  Induction date/time:      Cervical ripening date/time:      Indications for induction: Elective, Other Pregnant Patient Indications (Comment to specify)       Delivery/Placenta Date and Time      Delivery Date: 24 Delivery Time: 11:54 PM   Placenta Date/Time: 2024 12:00 AM  Oxytocin given at the time of delivery: after delivery of baby  Delivering clinician: Vicky Dior DO   Other personnel present at delivery:  Provider Role   Malina Johnson RN Delivery Nurse   [place] RN Registered Nurse             Vaginal Counts       Initial count performed by 2 team members:  Two Team Members   Dr. Barby Barnes RN         Needles Suture Needles Sponges (RETIRED) Instruments   Initial counts 2  5    Added to count  1     Relief counts       Final counts 2 1 5            Placed during labor Accounted for at the end of labor   FSE Yes Yes   IUPC Yes Yes   Cervidil No                   Final count performed by 2 team members:  Two Team Members   conchita brown dr      Final count correct?: Yes  Post-Birth Team Debrief: Complete       Apgars    Living status: Living   1 Minute 5 Minute 10 Minute 15 Minute 20 Minute   Skin color: 1  1       Heart rate: 2  2       Reflex irritability: 2  2       Muscle tone: 2  2       Respiratory effort: 2  2       Total: 9  9       Apgars assigned by: CONCHITA BARNES       Cord      Vessels: 3 Vessels    Cord Complications: None               Cord Blood Disposition: Lab    Gases Sent?: No    Delayed cord clamping?: Yes    Cord Clamping Delay (seconds):  seconds           [place] Resuscitation    Methods: None  Output in Delivery Room: Stool       [place] Measurements      Weight: 8 lb 14.5 oz Length: 1' 10\"     Head circumference: 34.3 cm    Output in delivery room: Stool       Skin " to Skin and Feeding Plan      Skin to skin initiation date/time: 1/8/1841    Skin to skin with: Mother  Skin to skin end date/time:            Delivery (Maternal) (Provider to Complete) ([id number])    Episiotomy: None  Perineal lacerations: 1st Repaired?: Yes   Repair suture: 3-0 Monocryl       Blood Loss  Mother: Carolyn Jacobs #[id number]     Start of Mother's Information      Delivery Blood Loss  08/31/24 2322 - 09/01/24 0032      Delivery QBL (mL) Hospital Encounter 276 mL    Total  276 mL               End of Mother's Information  Mother: Carolyn Jacobs #[id number]                Delivery - Provider to Complete ([id number])    Delivering clinician: Vicky Dior DO  Delivery Type (Choose the 1 that will go to the Birth History): Vaginal, Spontaneous                         Other personnel:  Provider Role   Malina Johnson RN Delivery Nurse   [place] RN Registered Nurse                    Placenta    Date/Time: 9/1/2024 12:00 AM  Removal: Spontaneous  Disposition: Pathology             Anesthesia    Method: INTRAVENOUS                     Presentation and Position    Presentation: Vertex    Position: Left Occiput Anterior                     Vicky Dior DO

## 2024-09-01 NOTE — PROVIDER NOTIFICATION
08/31/24 2153   Provider Notification   Provider Name/Title AMOS Winslow   Method of Notification Phone   Notification Reason Maternal Vital Sign Change     Updating MDA for primary RN on recent patient BPs after epidural. Pt treated 4x with phenylephrine, asymptomatic and FHT reassuring.      TORB to continue with phenylephrine treatment only if patient becomes symptomatic or indicated by tracing. Otherwise no need for treatment. Primary RN updated.

## 2024-09-01 NOTE — PROVIDER NOTIFICATION
"   09/01/24 0439   Provider Notification   Provider Name/Title Dr. Dior   Method of Notification Electronic Page   Request Evaluate-Remote   Notification Reason Medication Request     Via [employer] Messaging:    Writer 4264: \"Patient B.S. who delivered late last night at 2354 is nauseous. Wondering if we can get orders for something to help. Thanks!\"    MD 0440: \"Yes ok to place iv zofran 8 mg every 8 hours prn\"    "

## 2024-09-01 NOTE — ANESTHESIA PROCEDURE NOTES
"Epidural catheter Procedure Note    Pre-Procedure   Staff -        Anesthesiologist:  Etienne Winslow MD       Performed By: anesthesiologist       Referred By: Barby       Location: OB       Pre-Anesthestic Checklist: patient identified, IV checked, risks and benefits discussed, informed consent, monitors and equipment checked, pre-op evaluation, at physician/surgeon's request and post-op pain management  Timeout:       Correct Patient: Yes        Correct Procedure: Yes        Correct Site: Yes        Correct Position: Yes   Procedure Documentation  Procedure: epidural catheter       Patient Position: sitting       Patient Prep/Sterile Barriers: sterile gloves, mask, patient draped       Skin prep: Betadine       Local skin infiltrated with mL of 1% lidocaine.        Insertion Site: L2-3. (midline approach).       Technique: LORT saline        GARIMA at 6 cm.       Needle Type: Xtoney needle       Needle Gauge: 17.        Needle Length (Inches): 3.5        Catheter: 19 G.          Catheter threaded easily.         6 cm epidural space.         Threaded 12 cm at skin.         # of attempts: 1 and  # of redirects:     Assessment/Narrative         Paresthesias: No.       Test dose of 3 mL lidocaine 1.5% w/ 1:200,000 epinephrine at 21:07 CDT.        .       Insertion/Infusion Method: LORT saline       Aspiration negative for Heme or CSF via Epidural Catheter.    Medication(s) Administered   0.25% Bupivacaine PF (Epidural) - EPIDURAL   10 mL - 8/31/2024 9:10:00 PM    FOR [facility] ([facility] [place]) ONLY:   Pain Team Contact information: please page the Pain Team Via [email or web address]. Search \"Pain\". During daytime hours, please page the attending first. At night please page the resident first.      "
Yes

## 2024-09-01 NOTE — PLAN OF CARE
"Pt requesting to not be disturbed for most of shift.  C/O perineal discomfort; ice and pain meds providing some relief.  Able to void 600 ml w/o difficulty. Ambulating independently w/o difficulty.  C/O nausea and requesting medication; request in to Dr. Dior; see previous note by Miley LOFTON RN.  Zofran providing relief of nausea. Spouse present and supportive. Positive attachment behaviors noted w/ . Patients mobililty level scored using the bedside mobility assistance tool (BMAT). Patient is at a mobility level test number: 4. Mobility equipment used: none required. Required assist of 0 staff members. Further use of BMAT scoring not required.    Problem: Adult Inpatient Plan of Care  Goal: Plan of Care Review  Description: The Plan of Care Review/Shift note should be completed every shift.  The Outcome Evaluation is a brief statement about your assessment that the patient is improving, declining, or no change.  This information will be displayed automatically on your shift  note.  Outcome: Progressing  Flowsheets (Taken 2024)  Plan of Care Reviewed With: patient  Overall Patient Progress: improving  Goal: Patient-Specific Goal (Individualized)  Description: You can add care plan individualizations to a care plan. Examples of Individualization might be:  \"Parent requests to be called daily at 9am for status\", \"I have a hard time hearing out of my right ear\", or \"Do not touch me to wake me up as it startles  me\".  Outcome: Progressing  Goal: Absence of Hospital-Acquired Illness or Injury  Outcome: Progressing  Intervention: Prevent Skin Injury  Recent Flowsheet Documentation  Taken 2024 by Roxie Simental, RN  Body Position: position changed independently  Goal: Optimal Comfort and Wellbeing  Outcome: Progressing  Intervention: Monitor Pain and Promote Comfort  Recent Flowsheet Documentation  Taken 2024 by Roxie Simental, RN  Pain Management Interventions:   medication (see " MAR)   cold applied  Intervention: Provide Person-Centered Care  Recent Flowsheet Documentation  Taken 9/1/2024 0455 by Roxie Simental RN  Trust Relationship/Rapport:   care explained   choices provided   questions answered   questions encouraged  Goal: Readiness for Transition of Care  Outcome: Progressing     Problem: Postpartum (Vaginal Delivery)  Goal: Successful Parent Role Transition  Outcome: Progressing  Intervention: Support Parent Role Transition  Recent Flowsheet Documentation  Taken 9/1/2024 0455 by Roxie Simental RN  Supportive Measures: positive reinforcement provided  Goal: Hemostasis  Outcome: Progressing  Goal: Absence of Infection Signs and Symptoms  Outcome: Progressing  Goal: Anesthesia/Sedation Recovery  Outcome: Progressing  Goal: Optimal Pain Control and Function  Outcome: Progressing  Intervention: Prevent or Manage Pain  Recent Flowsheet Documentation  Taken 9/1/2024 0425 by Roxie Simental RN  Pain Management Interventions:   medication (see MAR)   cold applied  Perineal Care: perineal hygiene encouraged  Goal: Effective Urinary Elimination  Outcome: Progressing  Intervention: Monitor and Manage Urinary Retention  Recent Flowsheet Documentation  Taken 9/1/2024 0455 by Roxie Simental RN  Urinary Elimination Promotion: frequent voiding encouraged   Goal Outcome Evaluation:      Plan of Care Reviewed With: patient    Overall Patient Progress: improvingOverall Patient Progress: improving

## 2024-09-01 NOTE — PROVIDER NOTIFICATION
08/31/24 2238   Provider Notification   Provider Name/Title Dr. Dior   Method of Notification At Bedside   Request Evaluate in Person   Notification Reason SVE     Dr. Dior at bedside, SVE checked with an anterior lip, not reducible at this time. IUPC inserted by MD.

## 2024-09-01 NOTE — PROGRESS NOTES
S:  Essence   O:/60 (BP Location: Left arm, Patient Position: Semi-Prado's, Cuff Size: Adult Regular)   Temp 97.5  F (36.4  C) (Oral)   Resp 18   LMP 2023    : 8.5/50/-1      FHT's Category 2 with baseline 130s, category I in the last hour, no accelerations for 20 minutes  Accelerations-present within the last hour  Decelerations- none,   Variability-moderate      A:  34 year old y/o  @ 39w0d  wks EGA with IOL,  and GBS negative   P:  continue pitocin, continuous monitoring.         Dr. Vicky Dior, DO    OB/GYN   [facility]

## 2024-09-01 NOTE — PROVIDER NOTIFICATION
08/31/24 7743   Provider Notification   Provider Name/Title Dr. Dior   Method of Notification At Bedside   Request Evaluate in Person   Notification Reason SVE     MD at bedside, cervix reduced and SVE 10cm, set up for pushing started with MD remaining at bedside.

## 2024-09-01 NOTE — PROGRESS NOTES
S:  Essence   O:BP 90/53   Temp 97.5  F (36.4  C) (Oral)   Resp 18   LMP 2023   SpO2 98%    : 9.5 cm  IUPC placed, anterior lip reducible     FHT's Category 2 with baseline 140s  Accelerations-present   Decelerations- none,  Variability-moderate     Plan for category II tracing-continue laboring, reassuring fetal status      A:  34 year old y/o  @ 39w0d  wks EGA with IOL,  and GBS negative   P:  continue pitocin, continuous monitoring, recheck in 30-40 minutes.       Dr. Vicky iDor, DO    OB/GYN   [facility]

## 2024-09-01 NOTE — PROGRESS NOTES
"Ed LAGUNA Postpartum Progress Note     2024    DAILY NOTE - POSTPARTUM DAY 1    SUBJECTIVE: Did well overnight, no concerns other than having some issues with kids at home and needing to be discharged as soon as she can, prefers today but okay \"playing it by ear\" until tomorrow morning potentially. We discussed usually being here for 24 hours, but can potentially adjust as needed for her particular situation.    Pain controlled? Yes  Tolerating a regular diet? YES  Ambulating? YES  Voiding without difficulty? Yes  Lochia? minimal and moderate  Breastfeeding:  yes      OBJECTIVE:  Vitals:    24 0106 24 0455 24 0821 24 1136   BP: 97/54 105/49 97/54 102/54   BP Location:  Right arm Right arm Right arm   Patient Position:  Semi-Prado's Semi-Prado's Semi-Prado's   Cuff Size:   Adult Regular Adult Regular   Pulse:  76 69 73   Resp:  18  18   Temp:  97.8  F (36.6  C) 97.5  F (36.4  C) 98.2  F (36.8  C)   TempSrc:  Oral Oral Oral   SpO2:           Constitutional: healthy, alert, and no distress    Abdomen:  Uterine fundus is firm, non-tender and at the level of the umbilicus      LE:  trace edema, non-tender    LABS:  Hemoglobin   Date Value Ref Range Status   2024 9.4 (L) 11.7 - 15.7 g/dL Final   2024 9.7 (L) 11.7 - 15.7 g/dL Final   2018 12.9 11.7 - 15.7 g/dL Final   2017 10.1 (L) 11.7 - 15.7 g/dL Final     No results found for: \"RUBELLAABIGG\"   Lab Results   Component Value Date    ABO O 2017           Lab Results   Component Value Date    RH  Pos 2017        ASSESSMENT:  Post-partum day #1  Vaginal delivery after   Doing well.  Pain well-controlled.     PLAN:   Ambulation encouraged  Breast feeding strategies discussed  Pain control measures as needed  Reportable signs and symptoms dicussed with the patient  Anticipate discharge tomorrow, possibly later this evening if she states she cannot stay longer than this due to situation " at home and clinically stable.    Lissette Langston MD

## 2024-09-01 NOTE — ANESTHESIA PREPROCEDURE EVALUATION
Anesthesia Pre-Procedure Evaluation    Patient: Carolyn Jacobs   MRN: [id number] : 1990        Procedure :           Past Medical History:   Diagnosis Date    Abnormal Pap smear     HPV & normal     Abnormal Pap smear of cervix     Anemia     not on iron vits, O+ blood type    Anxiety     Depressive disorder     History of human papillomavirus infection     Migraines     Mirena IUD insertion 08/10/2017    Lot #  [id number] Exp. Date (last month of insertion) 2019 NDC # 72523-191-08 REMOVE BY AUG. 2022     Panic attacks     Postpartum depression     Uncomplicated asthma     Varicella     Varicosities       Past Surgical History:   Procedure Laterality Date     SECTION  2023    LTCS x1 (Doc)    wisdom teeth      ZZC INDUCED ABORTN BY DIL/EVAC  2005      No Known Allergies   Social History     Tobacco Use    Smoking status: Former     Current packs/day: 0.00     Average packs/day: 0.3 packs/day for 1 year (0.3 ttl pk-yrs)     Types: Cigarettes     Start date: 10/19/2013     Quit date: 10/19/2014     Years since quittin.8    Smokeless tobacco: Former    Tobacco comments:     5 cigs per day   Substance Use Topics    Alcohol use: No      Wt Readings from Last 1 Encounters:   24 90.3 kg (199 lb)        Anesthesia Evaluation        No history of anesthetic complications       ROS/MED HX  ENT/Pulmonary:     (+)                     Intermittent,                   Neurologic:  - neg neurologic ROS     Cardiovascular:  - neg cardiovascular ROS     METS/Exercise Tolerance:     Hematologic:  - neg hematologic  ROS     Musculoskeletal:       GI/Hepatic:  - neg GI/hepatic ROS     Renal/Genitourinary:       Endo:  - neg endo ROS     Psychiatric/Substance Use:  - neg psychiatric ROS     Infectious Disease:       Malignancy:       Other:     (-) previous  and TOLAC candidate       Physical Exam    Airway        Mallampati: II   TM distance: > 3 FB   Neck ROM: full   Mouth opening: > 3  "cm    Respiratory Devices and Support         Dental  no notable dental history         Cardiovascular   cardiovascular exam normal          Pulmonary   pulmonary exam normal                OUTSIDE LABS:  CBC:   Lab Results   Component Value Date    WBC 12.5 (H) 06/04/2024    WBC 8.4 07/26/2018    HGB 9.7 (L) 08/31/2024    HGB 10.7 (L) 06/04/2024    HCT 32.0 (L) 06/04/2024    HCT 39.6 07/26/2018     06/04/2024     07/26/2018     BMP:   Lab Results   Component Value Date     12/31/2014    POTASSIUM 3.9 12/31/2014    CHLORIDE 103 12/31/2014    CO2 28 12/31/2014    BUN 17 12/31/2014    CR 0.84 12/31/2014    GLC 99 12/31/2014     COAGS: No results found for: \"PTT\", \"INR\", \"FIBR\"  POC:   Lab Results   Component Value Date    HCG Negative 01/25/2015     HEPATIC: No results found for: \"ALBUMIN\", \"PROTTOTAL\", \"ALT\", \"AST\", \"GGT\", \"ALKPHOS\", \"BILITOTAL\", \"BILIDIRECT\", \"ADRIANA\"  OTHER:   Lab Results   Component Value Date    PAWAN 8.8 12/31/2014    TSH 1.29 07/26/2018    T4 0.83 11/03/2017    T3 93 11/03/2017    CRP <2.9 07/26/2018       Anesthesia Plan    ASA Status:  2       Anesthesia Type: Epidural.              Consents    Anesthesia Plan(s) and associated risks, benefits, and realistic alternatives discussed. Questions answered and patient/representative(s) expressed understanding.     - Discussed:     - Discussed with:  Patient            Postoperative Care            Comments:           neg OB ROS.      Etienne Winslow MD    I have reviewed the pertinent notes and labs in the chart from the past 30 days and (re)examined the patient.  Any updates or changes from those notes are reflected in this note.                  "

## 2024-09-02 VITALS
SYSTOLIC BLOOD PRESSURE: 108 MMHG | WEIGHT: 188.49 LBS | RESPIRATION RATE: 16 BRPM | DIASTOLIC BLOOD PRESSURE: 65 MMHG | BODY MASS INDEX: 32.35 KG/M2 | OXYGEN SATURATION: 98 % | HEART RATE: 66 BPM | TEMPERATURE: 98.1 F

## 2024-09-02 PROCEDURE — 250N000011 HC RX IP 250 OP 636: Performed by: FAMILY MEDICINE

## 2024-09-02 PROCEDURE — 250N000013 HC RX MED GY IP 250 OP 250 PS 637: Performed by: FAMILY MEDICINE

## 2024-09-02 RX ORDER — MEDROXYPROGESTERONE ACETATE 150 MG/ML
150 INJECTION, SUSPENSION INTRAMUSCULAR ONCE
Status: COMPLETED | OUTPATIENT
Start: 2024-09-02 | End: 2024-09-02

## 2024-09-02 RX ORDER — OXYCODONE HYDROCHLORIDE 5 MG/1
5 TABLET ORAL EVERY 4 HOURS PRN
Qty: 12 TABLET | Refills: 0 | Status: SHIPPED | OUTPATIENT
Start: 2024-09-02

## 2024-09-02 RX ORDER — AMOXICILLIN 250 MG
1 CAPSULE ORAL DAILY
Qty: 30 TABLET | Refills: 0 | Status: SHIPPED | OUTPATIENT
Start: 2024-09-02

## 2024-09-02 RX ADMIN — IBUPROFEN 800 MG: 800 TABLET, FILM COATED ORAL at 00:35

## 2024-09-02 RX ADMIN — MEDROXYPROGESTERONE ACETATE 150 MG: 150 INJECTION, SUSPENSION INTRAMUSCULAR at 10:48

## 2024-09-02 RX ADMIN — OXYCODONE HYDROCHLORIDE 5 MG: 5 TABLET ORAL at 08:42

## 2024-09-02 RX ADMIN — ACETAMINOPHEN 650 MG: 325 TABLET, FILM COATED ORAL at 00:35

## 2024-09-02 RX ADMIN — DOCUSATE SODIUM 100 MG: 100 CAPSULE, LIQUID FILLED ORAL at 08:42

## 2024-09-02 RX ADMIN — IBUPROFEN 800 MG: 800 TABLET, FILM COATED ORAL at 08:42

## 2024-09-02 ASSESSMENT — ACTIVITIES OF DAILY LIVING (ADL)
ADLS_ACUITY_SCORE: 20

## 2024-09-02 NOTE — DISCHARGE SUMMARY
[facility] Discharge Summary    Carolyn Jacobs MRN# [id number]   Age: 34 year old YOB: 1990     Date of Admission:  2024  Date of Discharge::  2024  Admitting Physician:  Vicky Dior DO  Discharge Physician:  Vicky Dior DO     Home clinic: [facility]          Admission Diagnoses:   Labor and delivery, indication for care  IOL, elective, late prenatal care, AMA           Discharge Diagnosis:     Normal spontaneous vaginal delivery  Intrauterine pregnancy at 39w0d weeks gestation          Procedures:     Procedure(s):        No other procedures performed during this admission           Medications Prior to Admission:     Medications Prior to Admission   Medication Sig Dispense Refill Last Dose    albuterol (PROAIR HFA/PROVENTIL HFA/VENTOLIN HFA) 108 (90 Base) MCG/ACT inhaler Inhale 1-2 puffs into the lungs   Past Month    fluticasone (FLONASE) 50 MCG/ACT nasal spray Spray 1 spray into both nostrils daily       fluticasone-salmeterol (ADVAIR-HFA) 230-21 MCG/ACT inhaler Inhale 2 puffs into the lungs       loratadine (CLARITIN) 10 MG tablet Take 10 mg by mouth daily.       Prenatal Vit-Fe Fumarate-FA (M-EUGENE PLUS) 27-1 MG TABS Take 1 tablet by mouth daily at 2 pm                Discharge Medications:     Current Discharge Medication List        START taking these medications    Details   oxyCODONE (ROXICODONE) 5 MG tablet Take 1 tablet (5 mg) by mouth every 4 hours as needed for breakthrough pain.  Qty: 12 tablet, Refills: 0    Associated Diagnoses:  (spontaneous vaginal delivery)      senna-docusate (SENOKOT-S/PERICOLACE) 8.6-50 MG tablet Take 1 tablet by mouth daily.  Qty: 30 tablet, Refills: 0    Associated Diagnoses:  (spontaneous vaginal delivery)           CONTINUE these medications which have NOT CHANGED    Details   albuterol (PROAIR HFA/PROVENTIL HFA/VENTOLIN HFA) 108 (90 Base) MCG/ACT inhaler Inhale 1-2 puffs into the  lungs      fluticasone (FLONASE) 50 MCG/ACT nasal spray Spray 1 spray into both nostrils daily      fluticasone-salmeterol (ADVAIR-HFA) 230-21 MCG/ACT inhaler Inhale 2 puffs into the lungs      loratadine (CLARITIN) 10 MG tablet Take 10 mg by mouth daily.      Prenatal Vit-Fe Fumarate-FA (M-EUGENE PLUS) 27-1 MG TABS Take 1 tablet by mouth daily at 2 pm                   Consultations:   No consultations were requested during this admission          Brief History of Labor:   33 y/o  @ 39w0d, late prenatal care, AMA, IOL, , hx of CS for twin gestation.   Admitted for IOl with . Desires depo-provera for contraception, then planning   Sterilization of herself or partner.            Hospital Course:   The patient's hospital course was unremarkable.  On discharge, her pain was well controlled. Vaginal bleeding is similar to peak menstrual flow.  Voiding without difficulty.  Ambulating well and tolerating a normal diet.  No fever.  Breastfeeding well.  Infant is stable.  No bowel movement yet.*  She was discharged on post-partum day #2.    Post-partum hemoglobin:   Hemoglobin   Date Value Ref Range Status   2024 9.4 (L) 11.7 - 15.7 g/dL Final   2018 12.9 11.7 - 15.7 g/dL Final             Discharge Instructions and Follow-Up:     Discharge diet: Regular   Discharge activity: Activity as tolerated   Discharge follow-up: Follow up with Gina in 6 weeks   Wound care: Drink plenty of fluids  Ice to area for comfort           Discharge Disposition:     Discharged to home      Attestation:  I have reviewed today's vital signs, notes, medications, labs and imaging.    Vicky Dior DO

## 2024-09-02 NOTE — PLAN OF CARE
"Vital signs stable. Fundus firm, midline, 1cm below the umbilicus. Lochia rubra and scant. Voiding without difficulty. Ambulating independently. Pain managed with Tylenol, Ibuprofen, and Oxycodone. Breast and formula feeding and tolerating well with no assistance. No one at bedside overnight, FOB attentive to and supportive of patient. Bonding well with infant. Independent with self and infant cares. Encouraging pt to call with any questions or concerns. Will monitor as needed and continue with plan of care     Goal Outcome Evaluation:      Plan of Care Reviewed With: patient    Overall Patient Progress: improvingOverall Patient Progress: improving       Problem: Adult Inpatient Plan of Care  Goal: Plan of Care Review  Description: The Plan of Care Review/Shift note should be completed every shift.  The Outcome Evaluation is a brief statement about your assessment that the patient is improving, declining, or no change.  This information will be displayed automatically on your shift  note.  Outcome: Progressing  Flowsheets (Taken 9/2/2024 0543)  Plan of Care Reviewed With: patient  Overall Patient Progress: improving  Goal: Patient-Specific Goal (Individualized)  Description: You can add care plan individualizations to a care plan. Examples of Individualization might be:  \"Parent requests to be called daily at 9am for status\", \"I have a hard time hearing out of my right ear\", or \"Do not touch me to wake me up as it startles  me\".  Outcome: Progressing  Goal: Absence of Hospital-Acquired Illness or Injury  Outcome: Progressing  Intervention: Prevent Skin Injury  Recent Flowsheet Documentation  Taken 9/2/2024 0020 by Joy Monzon RN  Body Position: position changed independently  Taken 9/1/2024 2024 by Joy Monzon RN  Body Position: position changed independently  Intervention: Prevent Infection  Recent Flowsheet Documentation  Taken 9/2/2024 0020 by Joy Monzon RN  Infection Prevention:   " single patient room provided   rest/sleep promoted   personal protective equipment utilized   hand hygiene promoted  Taken 9/1/2024 2100 by Joy Monzon RN  Infection Prevention:   single patient room provided   rest/sleep promoted   personal protective equipment utilized   hand hygiene promoted  Goal: Optimal Comfort and Wellbeing  Outcome: Progressing  Intervention: Monitor Pain and Promote Comfort  Recent Flowsheet Documentation  Taken 9/1/2024 2024 by Joy Monzon RN  Pain Management Interventions: medication (see MAR)  Intervention: Provide Person-Centered Care  Recent Flowsheet Documentation  Taken 9/2/2024 0020 by Joy Monzon RN  Trust Relationship/Rapport:   care explained   choices provided   emotional support provided   questions answered   questions encouraged   thoughts/feelings acknowledged  Taken 9/1/2024 2100 by Joy Monzon RN  Trust Relationship/Rapport:   care explained   choices provided   emotional support provided   questions answered   questions encouraged   thoughts/feelings acknowledged  Goal: Readiness for Transition of Care  Outcome: Progressing     Problem: Postpartum (Vaginal Delivery)  Goal: Successful Parent Role Transition  Outcome: Progressing  Intervention: Support Parent Role Transition  Recent Flowsheet Documentation  Taken 9/2/2024 0020 by Joy Monzon RN  Supportive Measures:   active listening utilized   decision-making supported   positive reinforcement provided   self-care encouraged  Parent-Child Attachment Promotion:   rooming-in promoted   participation in care promoted   interaction encouraged   face-to-face positioning promoted   cue recognition promoted   caring behavior modeled  Taken 9/1/2024 2100 by Joy Monzon RN  Supportive Measures:   active listening utilized   decision-making supported   positive reinforcement provided   self-care encouraged  Parent-Child Attachment Promotion:   rooming-in promoted   participation  in care promoted   interaction encouraged   face-to-face positioning promoted   cue recognition promoted   caring behavior modeled  Goal: Hemostasis  Outcome: Progressing  Intervention: Manage Bleeding  Recent Flowsheet Documentation  Taken 9/2/2024 0020 by Joy Monzon RN  Syncope Management: position changed slowly  Taken 9/1/2024 2100 by Joy Monzon RN  Syncope Management: position changed slowly  Goal: Absence of Infection Signs and Symptoms  Outcome: Progressing  Goal: Anesthesia/Sedation Recovery  Outcome: Progressing  Goal: Optimal Pain Control and Function  Outcome: Progressing  Intervention: Prevent or Manage Pain  Recent Flowsheet Documentation  Taken 9/1/2024 2024 by Joy Monzon RN  Pain Management Interventions: medication (see MAR)  Goal: Effective Urinary Elimination  Outcome: Progressing  Intervention: Monitor and Manage Urinary Retention  Recent Flowsheet Documentation  Taken 9/2/2024 0020 by Joy Monzon RN  Urinary Elimination Promotion: frequent voiding encouraged  Taken 9/1/2024 2100 by oJy Monzon RN  Urinary Elimination Promotion: frequent voiding encouraged

## 2024-09-02 NOTE — PLAN OF CARE
"Data: Vital signs within normal limits. Postpartum checks within normal limits - see flow record. Patient eating and drinking normally. Patient able to empty bladder independently and is up ambulating. No apparent signs of infection. Patient performing self cares and is able to care for infant.  Action: Patient medicated during the shift for pain and cramping. See MAR. Patient reassessed within 1 hour after each medication and pain was improved - patient stated she was comfortable. Patient education done about postpartum depression signs/symptoms, pt refused to fill out paper form, but did verbally tell this writer \"I'm at a zero\", MD notified.   Response: Positive attachment behaviors observed with infant. Support person present.   Plan: Anticipate discharge to home with infant this morning.      Problem: Adult Inpatient Plan of Care  Goal: Plan of Care Review    Outcome: Met  Flowsheets (Taken 2024 1103)  Outcome Evaluation: Discharge to home with   Plan of Care Reviewed With: patient  Overall Patient Progress: improving  Goal: Patient-Specific Goal (Individualized)    Outcome: Met  Goal: Absence of Hospital-Acquired Illness or Injury  Outcome: Met  Intervention: Prevent Skin Injury  Recent Flowsheet Documentation  Taken 2024 09 by Carri Guerrero, RN  Body Position: position changed independently  Intervention: Prevent Infection  Recent Flowsheet Documentation  Taken 2024 09 by Carri Guerrero, RN  Infection Prevention:   single patient room provided   rest/sleep promoted   personal protective equipment utilized   hand hygiene promoted  Goal: Optimal Comfort and Wellbeing  Outcome: Met  Intervention: Provide Person-Centered Care  Recent Flowsheet Documentation  Taken 2024 09 by Carri Guerrero, RN  Trust Relationship/Rapport:   care explained   choices provided   emotional support provided   questions answered   questions encouraged   thoughts/feelings acknowledged  Goal: " Readiness for Transition of Care  Outcome: Met       Goal Outcome Evaluation:      Plan of Care Reviewed With: patient    Overall Patient Progress: improving    Outcome Evaluation: Discharge to home with

## 2024-09-02 NOTE — DISCHARGE INSTRUCTIONS
Follow up in 6  weeks   Call [phone] for appointment     Call and ask to be seen or talk to a doctor for the following: (You can go to the ob triage button   On answering service line) .     Increased bleeding, fever, general unwell feeling or increased pain.     Dr. Vicky Dior, DO    OB/GYN   [facility]

## 2024-09-05 ENCOUNTER — TELEPHONE (OUTPATIENT)
Dept: OBGYN | Facility: CLINIC | Age: 34
End: 2024-09-05

## 2024-09-05 LAB
PATH REPORT.COMMENTS IMP SPEC: NORMAL
PATH REPORT.COMMENTS IMP SPEC: NORMAL
PATH REPORT.FINAL DX SPEC: NORMAL
PATH REPORT.GROSS SPEC: NORMAL
PATH REPORT.MICROSCOPIC SPEC OTHER STN: NORMAL
PATH REPORT.RELEVANT HX SPEC: NORMAL
PHOTO IMAGE: NORMAL

## 2024-09-05 NOTE — TELEPHONE ENCOUNTER
Caller reporting the following red-flag symptom(s): post partum blood clots/lightheaded/burry vision    Per the system red-flag symptom policy, patient was instructed to:  speak with a Registered Nurse    Action:  Patient warm transferred to a Registered Nurse

## 2024-09-05 NOTE — TELEPHONE ENCOUNTER
Spoke with Preeti.     Delivered 8/31 and discharged to home 9/2.     Yesterday 9/4 she passed two clots described as egg size. Right before she had cramping but once passed cramping resolved and bleeding normal    Today 9/5 she complains of extreme dizziness and feeling faint when moving. She also states she is having some vision changes.    Reports slight headache sometimes and swelling in feet.     She admits to being dehydrated and not having eaten much. Advised her to push fluids and increase food intake, especially iron rich variety.    Also encouraged to rest and reduce activity as needed and move slowly between movements to help with dizziness.    Patient also agreed to nurse BP check at clinic to rule out concerns.     CONCHITA Beck

## 2025-04-05 NOTE — NURSING NOTE
"Chief Complaint   Patient presents with     Prenatal Care     No concerns.     35w4d    Initial /66 mmHg  Temp(Src) 97.8  F (36.6  C) (Oral)  Wt 181 lb (82.101 kg)  LMP 04/30/2016 Estimated body mass index is 30.12 kg/(m^2) as calculated from the following:    Height as of 10/31/16: 5' 5\" (1.651 m).    Weight as of this encounter: 181 lb (82.101 kg).  BP completed using cuff size: regular  Steffany Omer MA      " None

## 2025-06-21 ENCOUNTER — HEALTH MAINTENANCE LETTER (OUTPATIENT)
Age: 35
End: 2025-06-21

## 2025-07-15 NOTE — MR AVS SNAPSHOT
After Visit Summary   6/27/2017    Carolyn Jacobs    MRN: [id number]           Patient Information     Date Of Birth          1990        Visit Information        Provider Department      6/27/2017 2:00 PM Magaly Redd LMFT [facility]        Today's Diagnoses     REILLY (generalized anxiety disorder)    -  1    Moderate episode of recurrent major depressive disorder (H)           Follow-ups after your visit        Your next 10 appointments already scheduled     Jul 13, 2017  8:45 AM CDT   Return Visit with Carmenza Menard NP   [facility] ([facility])    [address]  [address]  [facility] [id number] [phone]            Jul 17, 2017 10:00 AM CDT   Return Visit with SARAHI Martínez   [facility] ([facility])    [address]  [facility] [id number] [phone]              Who to contact     If you have questions or need follow up information about today's clinic visit or your schedule please contact [facility] directly at [phone].  Normal or non-critical lab and imaging results will be communicated to you by [employer], letter or phone within 4 business days after the clinic has received the results. If you do not hear from us within 7 days, please contact the clinic through [employer] or phone. If you have a critical or abnormal lab result, we will notify you by phone as soon as possible.  Submit refill requests through [facility] or call your pharmacy and they will forward the refill request to us. Please allow 3 business days for your refill to be completed.          Additional Information About Your Visit        MyChart Information     [facility] gives you secure access to your electronic health record. If you see a primary care provider, you can also send messages to your care team and make appointments. If you have questions, please call your  primary care clinic.  If you do not have a primary care provider, please call [phone] and they will assist you.        Care EveryWhere ID     This is your Care EveryWhere ID. This could be used by other organizations to access your [place] medical records  [id number]        Your Vitals Were     Last Period                   06/15/2017 (Approximate)            Blood Pressure from Last 3 Encounters:   06/15/17 102/60   05/17/17 106/64   05/10/17 98/60    Weight from Last 3 Encounters:   06/15/17 64 kg (141 lb)   05/17/17 66.7 kg (147 lb)   05/10/17 65.8 kg (145 lb)              Today, you had the following     No orders found for display       Primary Care Provider Office Phone # Fax #    JOSE Bonilla [phone]       [facility] [address]  [facility] [address]        Equal Access to Services     HANSEL VERDUGO : Hadii aad ku hadasho Soomaali, waaxda luqadaha, qaybta kaalmada adeegyada, waxay kenyettain hayjamesn duncan chester . So [facility] [phone].    ATENCIÓN: Si habla español, tiene a redding disposición servicios gratuitos de asistencia lingüística. Llame al [phone].    We comply with applicable federal civil rights laws and [place] laws. We do not discriminate on the basis of race, color, national origin, age, disability sex, sexual orientation or gender identity.            Thank you!     Thank you for choosing [facility]  for your care. Our goal is always to provide you with excellent care. Hearing back from our patients is one way we can continue to improve our services. Please take a few minutes to complete the written survey that you may receive in the mail after your visit with us. Thank you!             Your Updated Medication List - Protect others around you: Learn how to safely use, store and throw away your medicines at www.disposemymeds.org.          This list is accurate as of: 6/27/17  5:33 PM.  Always use your most recent  med list.                   Brand Name Dispense Instructions for use Diagnosis    busPIRone 10 MG tablet    BUSPAR    60 tablet    Start 1/2 tablet two times per day for one week, then increase to 1 tablet two times per day. For anxiety.    REILLY (generalized anxiety disorder)       clonazePAM 1 MG tablet   Start taking on:  2017    klonoPIN    90 tablet    Take 1 tablet (1 mg) by mouth 3 times daily as needed for anxiety Increased dose.    Postpartum anxiety       ibuprofen 800 MG tablet    ADVIL/MOTRIN    90 tablet    Take 1 tablet (800 mg) by mouth every 8 hours as needed for moderate pain     (spontaneous vaginal delivery)       medroxyPROGESTERone 150 MG/ML injection    DEPO-PROVERA    1 mL    Inject 1 mL (150 mg) into the muscle every 3 months    Depo-Provera contraceptive status, General counseling and advice on contraceptive management       venlafaxine 37.5 MG 24 hr capsule    EFFEXOR-XR    40 capsule    Take 1 capsule daily for 7 days, then take 2 capsules daily.    Major depressive disorder, recurrent episode, moderate (H)          oriented to person, place, time and situation